# Patient Record
Sex: MALE | Race: WHITE | Employment: FULL TIME | URBAN - METROPOLITAN AREA
[De-identification: names, ages, dates, MRNs, and addresses within clinical notes are randomized per-mention and may not be internally consistent; named-entity substitution may affect disease eponyms.]

---

## 2017-09-20 ENCOUNTER — HOSPITAL ENCOUNTER (EMERGENCY)
Facility: CLINIC | Age: 37
Discharge: HOME | End: 2017-09-20
Attending: EMERGENCY MEDICINE | Admitting: EMERGENCY MEDICINE
Payer: COMMERCIAL

## 2017-09-20 VITALS
OXYGEN SATURATION: 96 % | RESPIRATION RATE: 18 BRPM | DIASTOLIC BLOOD PRESSURE: 105 MMHG | SYSTOLIC BLOOD PRESSURE: 162 MMHG | TEMPERATURE: 98 F | WEIGHT: 255.73 LBS

## 2017-09-20 DIAGNOSIS — H65.92 LEFT SEROUS OTITIS MEDIA, UNSPECIFIED CHRONICITY: ICD-10-CM

## 2017-09-20 DIAGNOSIS — I16.0 HYPERTENSIVE URGENCY: ICD-10-CM

## 2017-09-20 DIAGNOSIS — H92.02 EAR PAIN, LEFT: ICD-10-CM

## 2017-09-20 LAB
ANION GAP SERPL CALCULATED.3IONS-SCNC: 5 MMOL/L (ref 3–14)
BASOPHILS # BLD AUTO: 0.1 10E9/L (ref 0–0.2)
BASOPHILS NFR BLD AUTO: 0.9 %
BUN SERPL-MCNC: 18 MG/DL (ref 7–30)
CALCIUM SERPL-MCNC: 8.6 MG/DL (ref 8.5–10.1)
CHLORIDE SERPL-SCNC: 104 MMOL/L (ref 94–109)
CO2 SERPL-SCNC: 29 MMOL/L (ref 20–32)
CREAT SERPL-MCNC: 0.98 MG/DL (ref 0.66–1.25)
DIFFERENTIAL METHOD BLD: ABNORMAL
EOSINOPHIL # BLD AUTO: 0.3 10E9/L (ref 0–0.7)
EOSINOPHIL NFR BLD AUTO: 3.5 %
ERYTHROCYTE [DISTWIDTH] IN BLOOD BY AUTOMATED COUNT: 13.4 % (ref 10–15)
GFR SERPL CREATININE-BSD FRML MDRD: 85 ML/MIN/1.7M2
GLUCOSE SERPL-MCNC: 94 MG/DL (ref 70–99)
HCT VFR BLD AUTO: 50 % (ref 40–53)
HGB BLD-MCNC: 16.6 G/DL (ref 13.3–17.7)
IMM GRANULOCYTES # BLD: 0.1 10E9/L (ref 0–0.4)
IMM GRANULOCYTES NFR BLD: 0.8 %
INTERPRETATION ECG - MUSE: NORMAL
LYMPHOCYTES # BLD AUTO: 3 10E9/L (ref 0.8–5.3)
LYMPHOCYTES NFR BLD AUTO: 30.3 %
MCH RBC QN AUTO: 28 PG (ref 26.5–33)
MCHC RBC AUTO-ENTMCNC: 33.2 G/DL (ref 31.5–36.5)
MCV RBC AUTO: 85 FL (ref 78–100)
MONOCYTES # BLD AUTO: 0.9 10E9/L (ref 0–1.3)
MONOCYTES NFR BLD AUTO: 8.7 %
NEUTROPHILS # BLD AUTO: 5.5 10E9/L (ref 1.6–8.3)
NEUTROPHILS NFR BLD AUTO: 55.8 %
NRBC # BLD AUTO: 0 10*3/UL
NRBC BLD AUTO-RTO: 0 /100
PLATELET # BLD AUTO: 212 10E9/L (ref 150–450)
POTASSIUM SERPL-SCNC: 3.6 MMOL/L (ref 3.4–5.3)
RBC # BLD AUTO: 5.92 10E12/L (ref 4.4–5.9)
SODIUM SERPL-SCNC: 138 MMOL/L (ref 133–144)
TROPONIN I SERPL-MCNC: <0.015 UG/L (ref 0–0.04)
TSH SERPL DL<=0.005 MIU/L-ACNC: 2.2 MU/L (ref 0.4–4)
WBC # BLD AUTO: 9.8 10E9/L (ref 4–11)

## 2017-09-20 PROCEDURE — 85025 COMPLETE CBC W/AUTO DIFF WBC: CPT | Performed by: EMERGENCY MEDICINE

## 2017-09-20 PROCEDURE — 99284 EMERGENCY DEPT VISIT MOD MDM: CPT | Mod: 25

## 2017-09-20 PROCEDURE — 84484 ASSAY OF TROPONIN QUANT: CPT | Performed by: EMERGENCY MEDICINE

## 2017-09-20 PROCEDURE — 84443 ASSAY THYROID STIM HORMONE: CPT | Performed by: EMERGENCY MEDICINE

## 2017-09-20 PROCEDURE — 93005 ELECTROCARDIOGRAM TRACING: CPT

## 2017-09-20 PROCEDURE — 80048 BASIC METABOLIC PNL TOTAL CA: CPT | Performed by: EMERGENCY MEDICINE

## 2017-09-20 PROCEDURE — 25000128 H RX IP 250 OP 636: Performed by: EMERGENCY MEDICINE

## 2017-09-20 PROCEDURE — 96374 THER/PROPH/DIAG INJ IV PUSH: CPT

## 2017-09-20 RX ORDER — AMOXICILLIN 500 MG/1
1000 CAPSULE ORAL 2 TIMES DAILY
Qty: 40 CAPSULE | Refills: 0 | Status: SHIPPED | OUTPATIENT
Start: 2017-09-20 | End: 2017-09-30

## 2017-09-20 RX ORDER — LIDOCAINE 40 MG/G
CREAM TOPICAL
Status: DISCONTINUED | OUTPATIENT
Start: 2017-09-20 | End: 2017-09-20 | Stop reason: HOSPADM

## 2017-09-20 RX ORDER — HYDROCHLOROTHIAZIDE 25 MG/1
25 TABLET ORAL DAILY
Qty: 14 TABLET | Refills: 0 | Status: SHIPPED | OUTPATIENT
Start: 2017-09-20 | End: 2017-10-06

## 2017-09-20 RX ORDER — LABETALOL HYDROCHLORIDE 5 MG/ML
20 INJECTION, SOLUTION INTRAVENOUS ONCE
Status: COMPLETED | OUTPATIENT
Start: 2017-09-20 | End: 2017-09-20

## 2017-09-20 RX ORDER — LORATADINE 10 MG/1
10 TABLET ORAL DAILY
Qty: 14 TABLET | Refills: 0 | Status: SHIPPED | OUTPATIENT
Start: 2017-09-20 | End: 2018-01-12

## 2017-09-20 RX ADMIN — LABETALOL HYDROCHLORIDE 20 MG: 5 INJECTION, SOLUTION INTRAVENOUS at 10:48

## 2017-09-20 ASSESSMENT — ENCOUNTER SYMPTOMS: DIZZINESS: 1

## 2017-09-20 NOTE — DISCHARGE INSTRUCTIONS
Discharge Instructions  Hypertension - High Blood Pressure    During you visit to the Emergency Department, your blood pressure was higher than the recommended blood pressure.  This may be related to stress, pain, medication or other temporary conditions. In these cases, your blood pressure may return to normal on its own. If you have a history of high blood pressure, you may need to have your doctor adjust your medications. Sometimes, your high measurement here may indicate that you have developed high blood pressure that will stay high unless it is treated. Sudden very high blood pressure can cause problems, but usually high blood pressure causes problems over months to years.      Blood pressure is almost never lowered in the Emergency Department, because studies have shown that lowering blood pressure too quickly is much more dangerous than leaving it alone.    You need to follow up with your doctor in 1-3 days to get your blood pressure rechecked.     Return to the Emergency Department if you start to have:    A severe headache.    Chest pain.    Shortness of breath.    Weakness or numbness that affects one part of the body.    Confusion.    Vision changes.    Significant swelling of legs and/or eyes.    A reaction to any medication started in the Emergency Department.    What can I do to help myself?    Avoid alcohol.    Take any blood pressure medicine that you are prescribed.    Get a good night s sleep.    Lower your salt intake.    Exercise.    Lose weight.    Manage stress.    If blood pressure medication was started in the Emergency Department:    The medicine may not have an immediate effect. The body and brain determine what blood pressure you have. The medicine s job is to retrain the body s  thermostat  to a lower blood pressure.    You will need to follow up with your doctor to see how this medicine is working for you.  If you were given a prescription for medicine here today, be sure to read all of  the information (including the package insert) that comes with your prescription.  This will include important information about the medicine, its side effects, and any warnings that you need to know about.  The pharmacist who fills the prescription can provide more information and answer questions you may have about the medicine.  If you have questions or concerns that the pharmacist cannot address, please call or return to the Emergency Department.   Opioid Medication Information    Pain medications are among the most commonly prescribed medicines, so we are including this information for all our patients. If you did not receive pain medication or get a prescription for pain medicine, you can ignore it.     You may have been given a prescription for an opioid (narcotic) pain medicine and/or have received a pain medicine while here in the Emergency Department. These medicines can make you drowsy or impaired. You must not drive, operate dangerous equipment, or engage in any other dangerous activities while taking these medications. If you drive while taking these medications, you could be arrested for DUI, or driving under the influence. Do not drink any alcohol while you are taking these medications.     Opioid pain medications can cause addiction. If you have a history of chemical dependency of any type, you are at a higher risk of becoming addicted to pain medications.  Only take these prescribed medications to treat your pain when all other options have been tried. Take it for as short a time and as few doses as possible. Store your pain pills in a secure place, as they are frequently stolen and provide a dangerous opportunity for children or visitors in your house to start abusing these powerful medications. We will not replace any lost or stolen medicine.  As soon as your pain is better, you should flush all your remaining medication.     Many prescription pain medications contain Tylenol  (acetaminophen),  including Vicodin , Tylenol #3 , Norco , Lortab , and Percocet .  You should not take any extra pills of Tylenol  if you are using these prescription medications or you can get very sick.  Do not ever take more than 3000 mg of acetaminophen in any 24 hour period.    All opioids tend to cause constipation. Drink plenty of water and eat foods that have a lot of fiber, such as fruits, vegetables, prune juice, apple juice and high fiber cereal.  Take a laxative if you don t move your bowels at least every other day. Miralax , Milk of Magnesia, Colace , or Senna  can be used to keep you regular.      Remember that you can always come back to the Emergency Department if you are not able to see your regular doctor in the amount of time listed above, if you get any new symptoms, or if there is anything that worries you.      Discharge Instructions  Otitis Media  You or your child have an ear infection known as acute otitis media, or middle ear infection (otitis = ear, media = middle). These infections often develop after a virus infection, such as a cold. The cold causes swelling around the pressure-equalizing tube of the ear, which allows fluid to build up in the space behind the eardrum (the middle ear). This fluid build-up can trap bacteria and viruses and increase pressure on the eardrum causing pain.  Return to the Emergency Department if:    You or your child are not better within 24-48 hours.    Your child becomes very fussy or weak.    Your child is showing signs of dehydration, such as less than 3 wet diapers per day.    Your symptoms get worse, or if you develop a severe headache, stiff neck, or new symptoms.    You have signs of allergic reaction to medicine. These include rash, lip swelling, difficulty breathing, wheezing, and dizziness.    Ear infection symptoms:     Symptoms of an ear infection can include ear aching or pain and temporary hearing loss. These symptoms often come on suddenly.    Ear infection  "symptoms (infants / young children):    Fever (temperature greater than 100.4 F or 38 C).    Pulling on the ear.    Fussiness.    Decreased activity.    Lack of appetite or difficulty eating.    Vomiting or diarrhea.    Treatment:     The \"best\" treatment depends on your age, history of previous infections, and any underlying medical problems.    Antibiotics are not given to every patient with an ear infection because studies show that many people with ear infections will improve without using antibiotics. Because antibiotics can have side effects such as diarrhea and stomach upset and can also cause severe allergic reactions, doctors are trying to avoid using antibiotics if it is safe for the patient to do so.   In these cases, a prescription for antibiotics may be given to be filled in 24 -48 hours if symptoms are getting worse or not improving. If the symptoms are improving, the antibiotic does not need to be taken.     Remember, antibiotics do not treat pain.      Pain medications. You may take a pain medication such as Tylenol  (acetaminophen), Advil  (ibuprofen), Nuprin  (ibuprofen) or Aleve  (naproxen).  If you have been given a narcotic such as Vicodin  (hydrocodone with acetaminophen), Percocet  (oxycodone with acetaminophen), or codeine, do not drive for four hours after you have taken it. If the narcotic contains Tylenol  (acetaminophen), do not take Tylenol  with it. All narcotics will cause constipation, so eat a high fiber diet.      Pain treatment options also include ear drops such as Auralgan  (antipyrine/benzocaine/u-polycosanol), which contains a topical numbing medicine.     Do not take a medication if you have a known allergy to that medication.  Probiotics: If you have been given an antibiotic, you may want to also take a probiotic pill or eat yogurt with live cultures. Probiotics have \"good bacteria\" to help your intestines stay healthy. Studies have shown that probiotics help prevent diarrhea " and other intestine problems (including C. diff infection) when you take antibiotics. You can buy these without a prescription in the pharmacy section of the store.   Complications:      Tympanic membrane rupture - One possible complication of an ear infection is rupture of the tympanic membrane, or ear drum. This happens because of pressure on the tympanic membrane from the infected fluid. When the tympanic membrane ruptures, you may have pus or blood drain from the ear. It does not hurt when the membrane ruptures, and many people actually feel better because pressure is released. Fortunately, the tympanic membrane usually heals quickly after rupturing, within hours to days. You should keep water out of the ear until you re-check with your doctor to be sure the ear drum has healed.       Mastoiditis - Rarely, the area behind the ear can become infected, this area is called the mastoid.  If you notice redness and swelling behind your ear, see your physician or return to the Emergency Department immediately.        Hearing loss - The fluid that collects behind the eardrum (called an effusion) can persist for weeks to months after the pain of an ear infection resolves. An effusion causes trouble hearing, which is usually temporary. If the fluid persists, however, it can interfere with the process of learning to speak.   For this reason, children under 2 need to be seen by their pediatrician WITHIN 3 MONTHS to ensure that the fluid has been reabsorbed.  If you were given a prescription for medicine here today, be sure to read all of the information (including the package insert) that comes with your prescription.  This will include important information about the medicine, its side effects, and any warnings that you need to know about.  The pharmacist who fills the prescription can provide more information and answer questions you may have about the medicine.  If you have questions or concerns that the pharmacist  cannot address, please call or return to the Emergency Department.   Opioid Medication Information    Pain medications are among the most commonly prescribed medicines, so we are including this information for all our patients. If you did not receive pain medication or get a prescription for pain medicine, you can ignore it.     You may have been given a prescription for an opioid (narcotic) pain medicine and/or have received a pain medicine while here in the Emergency Department. These medicines can make you drowsy or impaired. You must not drive, operate dangerous equipment, or engage in any other dangerous activities while taking these medications. If you drive while taking these medications, you could be arrested for DUI, or driving under the influence. Do not drink any alcohol while you are taking these medications.     Opioid pain medications can cause addiction. If you have a history of chemical dependency of any type, you are at a higher risk of becoming addicted to pain medications.  Only take these prescribed medications to treat your pain when all other options have been tried. Take it for as short a time and as few doses as possible. Store your pain pills in a secure place, as they are frequently stolen and provide a dangerous opportunity for children or visitors in your house to start abusing these powerful medications. We will not replace any lost or stolen medicine.  As soon as your pain is better, you should flush all your remaining medication.     Many prescription pain medications contain Tylenol  (acetaminophen), including Vicodin , Tylenol #3 , Norco , Lortab , and Percocet .  You should not take any extra pills of Tylenol  if you are using these prescription medications or you can get very sick.  Do not ever take more than 3000 mg of acetaminophen in any 24 hour period.    All opioids tend to cause constipation. Drink plenty of water and eat foods that have a lot of fiber, such as fruits,  vegetables, prune juice, apple juice and high fiber cereal.  Take a laxative if you don t move your bowels at least every other day. Miralax , Milk of Magnesia, Colace , or Senna  can be used to keep you regular.      Remember that you can always come back to the Emergency Department if you are not able to see your regular doctor in the amount of time listed above, if you get any new symptoms, or if there is anything that worries you.

## 2017-09-20 NOTE — ED AVS SNAPSHOT
[facility] Emergency Department    [address]    [facility] [address]    Phone:  [phone]    Fax:  [phone]                                       Frank Flowers   MRN: [id number]    Department:  [facility] Emergency Department   Date of Visit:  9/20/2017           Patient Information     Date Of Birth          1980        Your diagnoses for this visit were:     Hypertensive urgency     Left serous otitis media, unspecified chronicity     Ear pain, left        You were seen by Erasmo Montgomery MD.      Follow-up Information     Follow up with [facility]. Schedule an appointment as soon as possible for a visit in 3 days.    Specialties:  Sports Medicine, Pain Management, Obstetrics & Gynecology, Pediatrics, Internal Medicine, Nephrology    Contact information:    [address]  [facility] [address] [phone]        Discharge Instructions       Discharge Instructions  Hypertension - High Blood Pressure    During you visit to the Emergency Department, your blood pressure was higher than the recommended blood pressure.  This may be related to stress, pain, medication or other temporary conditions. In these cases, your blood pressure may return to normal on its own. If you have a history of high blood pressure, you may need to have your doctor adjust your medications. Sometimes, your high measurement here may indicate that you have developed high blood pressure that will stay high unless it is treated. Sudden very high blood pressure can cause problems, but usually high blood pressure causes problems over months to years.      Blood pressure is almost never lowered in the Emergency Department, because studies have shown that lowering blood pressure too quickly is much more dangerous than leaving it alone.    You need to follow up with your doctor in 1-3 days to get your blood pressure rechecked.     Return  to the Emergency Department if you start to have:    A severe headache.    Chest pain.    Shortness of breath.    Weakness or numbness that affects one part of the body.    Confusion.    Vision changes.    Significant swelling of legs and/or eyes.    A reaction to any medication started in the Emergency Department.    What can I do to help myself?    Avoid alcohol.    Take any blood pressure medicine that you are prescribed.    Get a good night s sleep.    Lower your salt intake.    Exercise.    Lose weight.    Manage stress.    If blood pressure medication was started in the Emergency Department:    The medicine may not have an immediate effect. The body and brain determine what blood pressure you have. The medicine s job is to retrain the body s  thermostat  to a lower blood pressure.    You will need to follow up with your doctor to see how this medicine is working for you.  If you were given a prescription for medicine here today, be sure to read all of the information (including the package insert) that comes with your prescription.  This will include important information about the medicine, its side effects, and any warnings that you need to know about.  The pharmacist who fills the prescription can provide more information and answer questions you may have about the medicine.  If you have questions or concerns that the pharmacist cannot address, please call or return to the Emergency Department.   Opioid Medication Information    Pain medications are among the most commonly prescribed medicines, so we are including this information for all our patients. If you did not receive pain medication or get a prescription for pain medicine, you can ignore it.     You may have been given a prescription for an opioid (narcotic) pain medicine and/or have received a pain medicine while here in the Emergency Department. These medicines can make you drowsy or impaired. You must not drive, operate dangerous equipment, or  engage in any other dangerous activities while taking these medications. If you drive while taking these medications, you could be arrested for DUI, or driving under the influence. Do not drink any alcohol while you are taking these medications.     Opioid pain medications can cause addiction. If you have a history of chemical dependency of any type, you are at a higher risk of becoming addicted to pain medications.  Only take these prescribed medications to treat your pain when all other options have been tried. Take it for as short a time and as few doses as possible. Store your pain pills in a secure place, as they are frequently stolen and provide a dangerous opportunity for children or visitors in your house to start abusing these powerful medications. We will not replace any lost or stolen medicine.  As soon as your pain is better, you should flush all your remaining medication.     Many prescription pain medications contain Tylenol  (acetaminophen), including Vicodin , Tylenol #3 , Norco , Lortab , and Percocet .  You should not take any extra pills of Tylenol  if you are using these prescription medications or you can get very sick.  Do not ever take more than 3000 mg of acetaminophen in any 24 hour period.    All opioids tend to cause constipation. Drink plenty of water and eat foods that have a lot of fiber, such as fruits, vegetables, prune juice, apple juice and high fiber cereal.  Take a laxative if you don t move your bowels at least every other day. Miralax , Milk of Magnesia, Colace , or Senna  can be used to keep you regular.      Remember that you can always come back to the Emergency Department if you are not able to see your regular doctor in the amount of time listed above, if you get any new symptoms, or if there is anything that worries you.      Discharge Instructions  Otitis Media  You or your child have an ear infection known as acute otitis media, or middle ear infection (otitis = ear,  "media = middle). These infections often develop after a virus infection, such as a cold. The cold causes swelling around the pressure-equalizing tube of the ear, which allows fluid to build up in the space behind the eardrum (the middle ear). This fluid build-up can trap bacteria and viruses and increase pressure on the eardrum causing pain.  Return to the Emergency Department if:    You or your child are not better within 24-48 hours.    Your child becomes very fussy or weak.    Your child is showing signs of dehydration, such as less than 3 wet diapers per day.    Your symptoms get worse, or if you develop a severe headache, stiff neck, or new symptoms.    You have signs of allergic reaction to medicine. These include rash, lip swelling, difficulty breathing, wheezing, and dizziness.    Ear infection symptoms:     Symptoms of an ear infection can include ear aching or pain and temporary hearing loss. These symptoms often come on suddenly.    Ear infection symptoms (infants / young children):    Fever (temperature greater than 100.4 F or 38 C).    Pulling on the ear.    Fussiness.    Decreased activity.    Lack of appetite or difficulty eating.    Vomiting or diarrhea.    Treatment:     The \"best\" treatment depends on your age, history of previous infections, and any underlying medical problems.    Antibiotics are not given to every patient with an ear infection because studies show that many people with ear infections will improve without using antibiotics. Because antibiotics can have side effects such as diarrhea and stomach upset and can also cause severe allergic reactions, doctors are trying to avoid using antibiotics if it is safe for the patient to do so.   In these cases, a prescription for antibiotics may be given to be filled in 24 -48 hours if symptoms are getting worse or not improving. If the symptoms are improving, the antibiotic does not need to be taken.     Remember, antibiotics do not treat pain. " "     Pain medications. You may take a pain medication such as Tylenol  (acetaminophen), Advil  (ibuprofen), Nuprin  (ibuprofen) or Aleve  (naproxen).  If you have been given a narcotic such as Vicodin  (hydrocodone with acetaminophen), Percocet  (oxycodone with acetaminophen), or codeine, do not drive for four hours after you have taken it. If the narcotic contains Tylenol  (acetaminophen), do not take Tylenol  with it. All narcotics will cause constipation, so eat a high fiber diet.      Pain treatment options also include ear drops such as Auralgan  (antipyrine/benzocaine/u-polycosanol), which contains a topical numbing medicine.     Do not take a medication if you have a known allergy to that medication.  Probiotics: If you have been given an antibiotic, you may want to also take a probiotic pill or eat yogurt with live cultures. Probiotics have \"good bacteria\" to help your intestines stay healthy. Studies have shown that probiotics help prevent diarrhea and other intestine problems (including C. diff infection) when you take antibiotics. You can buy these without a prescription in the pharmacy section of the store.   Complications:      Tympanic membrane rupture - One possible complication of an ear infection is rupture of the tympanic membrane, or ear drum. This happens because of pressure on the tympanic membrane from the infected fluid. When the tympanic membrane ruptures, you may have pus or blood drain from the ear. It does not hurt when the membrane ruptures, and many people actually feel better because pressure is released. Fortunately, the tympanic membrane usually heals quickly after rupturing, within hours to days. You should keep water out of the ear until you re-check with your doctor to be sure the ear drum has healed.       Mastoiditis - Rarely, the area behind the ear can become infected, this area is called the mastoid.  If you notice redness and swelling behind your ear, see your physician or " return to the Emergency Department immediately.        Hearing loss - The fluid that collects behind the eardrum (called an effusion) can persist for weeks to months after the pain of an ear infection resolves. An effusion causes trouble hearing, which is usually temporary. If the fluid persists, however, it can interfere with the process of learning to speak.   For this reason, children under 2 need to be seen by their pediatrician WITHIN 3 MONTHS to ensure that the fluid has been reabsorbed.  If you were given a prescription for medicine here today, be sure to read all of the information (including the package insert) that comes with your prescription.  This will include important information about the medicine, its side effects, and any warnings that you need to know about.  The pharmacist who fills the prescription can provide more information and answer questions you may have about the medicine.  If you have questions or concerns that the pharmacist cannot address, please call or return to the Emergency Department.   Opioid Medication Information    Pain medications are among the most commonly prescribed medicines, so we are including this information for all our patients. If you did not receive pain medication or get a prescription for pain medicine, you can ignore it.     You may have been given a prescription for an opioid (narcotic) pain medicine and/or have received a pain medicine while here in the Emergency Department. These medicines can make you drowsy or impaired. You must not drive, operate dangerous equipment, or engage in any other dangerous activities while taking these medications. If you drive while taking these medications, you could be arrested for DUI, or driving under the influence. Do not drink any alcohol while you are taking these medications.     Opioid pain medications can cause addiction. If you have a history of chemical dependency of any type, you are at a higher risk of becoming  addicted to pain medications.  Only take these prescribed medications to treat your pain when all other options have been tried. Take it for as short a time and as few doses as possible. Store your pain pills in a secure place, as they are frequently stolen and provide a dangerous opportunity for children or visitors in your house to start abusing these powerful medications. We will not replace any lost or stolen medicine.  As soon as your pain is better, you should flush all your remaining medication.     Many prescription pain medications contain Tylenol  (acetaminophen), including Vicodin , Tylenol #3 , Norco , Lortab , and Percocet .  You should not take any extra pills of Tylenol  if you are using these prescription medications or you can get very sick.  Do not ever take more than 3000 mg of acetaminophen in any 24 hour period.    All opioids tend to cause constipation. Drink plenty of water and eat foods that have a lot of fiber, such as fruits, vegetables, prune juice, apple juice and high fiber cereal.  Take a laxative if you don t move your bowels at least every other day. Miralax , Milk of Magnesia, Colace , or Senna  can be used to keep you regular.      Remember that you can always come back to the Emergency Department if you are not able to see your regular doctor in the amount of time listed above, if you get any new symptoms, or if there is anything that worries you.        24 Hour Appointment Hotline       To make an appointment at any [facility], call [phone] ([phone]). If you don't have a family doctor or clinic, we will help you find one. [facility] are conveniently located to serve the needs of you and your family.             Review of your medicines      START taking        Dose / Directions Last dose taken    amoxicillin 500 MG capsule   Commonly known as:  AMOXIL   Dose:  1000 mg   Quantity:  40 capsule        Take 2 capsules (1,000 mg) by mouth 2 times daily for 10  days   Refills:  0        hydrochlorothiazide 25 MG tablet   Commonly known as:  HYDRODIURIL   Dose:  25 mg   Quantity:  14 tablet        Take 1 tablet (25 mg) by mouth daily   Refills:  0        loratadine 10 MG tablet   Commonly known as:  CLARITIN   Dose:  10 mg   Quantity:  14 tablet        Take 1 tablet (10 mg) by mouth daily   Refills:  0                Prescriptions were sent or printed at these locations (3 Prescriptions)                   Other Prescriptions                Printed at Department/Unit printer (3 of 3)         amoxicillin (AMOXIL) 500 MG capsule               loratadine (CLARITIN) 10 MG tablet               hydrochlorothiazide (HYDRODIURIL) 25 MG tablet                Procedures and tests performed during your visit     Basic metabolic panel    CBC with platelets differential    EKG 12 lead    Peripheral IV catheter    Pulse oximetry nursing    TSH    Troponin I    Vital signs      Orders Needing Specimen Collection     None      Pending Results     No orders found from 9/18/2017 to 9/21/2017.            Pending Culture Results     No orders found from 9/18/2017 to 9/21/2017.            Pending Results Instructions     If you had any lab results that were not finalized at the time of your Discharge, you can call the ED Lab Result RN at [phone]. You will be contacted by this team for any positive Lab results or changes in treatment. The nurses are available 7 days a week from 10A to 6:30P.  You can leave a message 24 hours per day and they will return your call.        Test Results From Your Hospital Stay        9/20/2017 10:49 AM      Component Results     Component Value Ref Range & Units Status    WBC 9.8 4.0 - 11.0 10e9/L Final    RBC Count 5.92 (H) 4.4 - 5.9 10e12/L Final    Hemoglobin 16.6 13.3 - 17.7 g/dL Final    Hematocrit 50.0 40.0 - 53.0 % Final    MCV 85 78 - 100 fl Final    MCH 28.0 26.5 - 33.0 pg Final    MCHC 33.2 31.5 - 36.5 g/dL Final    RDW 13.4 10.0 - 15.0 % Final     Platelet Count 212 150 - 450 10e9/L Final    Diff Method Automated Method  Final    % Neutrophils 55.8 % Final    % Lymphocytes 30.3 % Final    % Monocytes 8.7 % Final    % Eosinophils 3.5 % Final    % Basophils 0.9 % Final    % Immature Granulocytes 0.8 % Final    Nucleated RBCs 0 0 /100 Final    Absolute Neutrophil 5.5 1.6 - 8.3 10e9/L Final    Absolute Lymphocytes 3.0 0.8 - 5.3 10e9/L Final    Absolute Monocytes 0.9 0.0 - 1.3 10e9/L Final    Absolute Eosinophils 0.3 0.0 - 0.7 10e9/L Final    Absolute Basophils 0.1 0.0 - 0.2 10e9/L Final    Abs Immature Granulocytes 0.1 0 - 0.4 10e9/L Final    Absolute Nucleated RBC 0.0  Final         9/20/2017 11:08 AM      Component Results     Component Value Ref Range & Units Status    Sodium 138 133 - 144 mmol/L Final    Potassium 3.6 3.4 - 5.3 mmol/L Final    Chloride 104 94 - 109 mmol/L Final    Carbon Dioxide 29 20 - 32 mmol/L Final    Anion Gap 5 3 - 14 mmol/L Final    Glucose 94 70 - 99 mg/dL Final    Urea Nitrogen 18 7 - 30 mg/dL Final    Creatinine 0.98 0.66 - 1.25 mg/dL Final    GFR Estimate 85 >60 mL/min/1.7m2 Final    Non  GFR Calc    GFR Estimate If Black >90 >60 mL/min/1.7m2 Final    African American GFR Calc    Calcium 8.6 8.5 - 10.1 mg/dL Final         9/20/2017 11:12 AM      Component Results     Component Value Ref Range & Units Status    TSH 2.20 0.40 - 4.00 mU/L Final         9/20/2017 11:13 AM      Component Results     Component Value Ref Range & Units Status    Troponin I ES <0.015 0.000 - 0.045 ug/L Final    The 99th percentile for upper reference range is 0.045 ug/L.  Troponin values   in the range of 0.045 - 0.120 ug/L may be associated with risks of adverse   clinical events.                  Clinical Quality Measure: Blood Pressure Screening     Your blood pressure was checked while you were in the emergency department today. The last reading we obtained was  BP: (!) 160/110 . Please read the guidelines below about what these numbers  "mean and what you should do about them.  If your systolic blood pressure (the top number) is less than 120 and your diastolic blood pressure (the bottom number) is less than 80, then your blood pressure is normal. There is nothing more that you need to do about it.  If your systolic blood pressure (the top number) is 120-139 or your diastolic blood pressure (the bottom number) is 80-89, your blood pressure may be higher than it should be. You should have your blood pressure rechecked within a year by a primary care provider.  If your systolic blood pressure (the top number) is 140 or greater or your diastolic blood pressure (the bottom number) is 90 or greater, you may have high blood pressure. High blood pressure is treatable, but if left untreated over time it can put you at risk for heart attack, stroke, or kidney failure. You should have your blood pressure rechecked by a primary care provider within the next 4 weeks.  If your provider in the emergency department today gave you specific instructions to follow-up with your doctor or provider even sooner than that, you should follow that instruction and not wait for up to 4 weeks for your follow-up visit.        Thank you for choosing [facility]       Thank you for choosing [facility] for your care. Our goal is always to provide you with excellent care. Hearing back from our patients is one way we can continue to improve our services. Please take a few minutes to complete the written survey that you may receive in the mail after you visit with us. Thank you!        [employer] Information     [employer] lets you send messages to your doctor, view your test results, renew your prescriptions, schedule appointments and more. To sign up, go to [email or web address] . Click on \"Log in\" on the left side of the screen, which will take you to the Welcome page. Then click on \"Sign up Now\" on the right side of the page.     You will be asked to enter the access code listed below, " as well as some personal information. Please follow the directions to create your username and password.     Your access code is: [email or web address]  Expires: 2017 11:50 AM     Your access code will  in 90 days. If you need help or a new code, please call your [facility] clinic or [phone].        Care EveryWhere ID     This is your Care EveryWhere ID. This could be used by other organizations to access your [facility] medical records  [id number]        Equal Access to Services     ALIN MACIAS : Sangita ortega Sohermes, wawisam luqbeatrice, qaconsuelo kaalmaguillermina young, judy monterroso . So [facility] [phone].    ATENCIÓN: Si habla español, tiene a zarco disposición servicios gratuitos de asistencia lingüística. Llame al [phone].    We comply with applicable federal civil rights laws and [place] laws. We do not discriminate on the basis of race, color, national origin, age, disability sex, sexual orientation or gender identity.            After Visit Summary       This is your record. Keep this with you and show to your community pharmacist(s) and doctor(s) at your next visit.

## 2017-09-20 NOTE — ED PROVIDER NOTES
History     Chief Complaint:  Ear Pain    HPI   Frank Flowers is a 37 year old male who presents to the emergency department today for evaluation of ear pain. The patient developed left sided ear pain a few days ago. Pain has not improved and is aggravated with laying flat. Patient went to [facility]. Patient was not found to have an ear infection and he was advised to visit the ED due to elevated blood pressures. Here, patient rates his ear pain to be 5/10. He last checked his blood pressure in 2012 after he went to Urgent care for chest pain. He was subsequently diagnosed with costochondritis. He admits to have some chest discomfort recently but believes this could be another episode of costochondritis. He also reports an episode of dizziness last night. Patient reports increased personal stress. He also reports that he used to drink a lot of 5 hour energy but recently stopped this. Patient's last alcohol use was 2 days ago.       Allergies:  No Known Drug Allergies    Medications:    The patient is currently on no regular medications.    Past Medical History:    History reviewed. No pertinent past medical history.    Past Surgical History:    History reviewed. No pertinent past surgical history.    Family History:    Depression   Anxiety     Social History:  The patient presents alone.  Smoking Status: Current every day smoker- 0.50 packs/day  Smokeless Tobacco: Never used  Alcohol Use: Yes  Marital Status:  Single [1]     Review of Systems   HENT: Positive for ear pain.    Cardiovascular: Positive for chest pain.   Neurological: Positive for dizziness.   All other systems reviewed and are negative.    Physical Exam   First Vitals:  BP: (!) 230/141  Heart Rate: 86  Temp: 98  F (36.7  C)  Resp: 16  Weight: 116 kg (255 lb 11.7 oz)  SpO2: 100 %      1130 BP- 160/110    Physical Exam  General: The patient is alert, in no respiratory distress.    HENT: Mucous membranes moist. Opaque fluid behind left TM.      Cardiovascular: Regular rate and rhythm. Good pulses in all four extremities. Normal capillary refill and skin turgor.     Respiratory: Lungs are clear. No nasal flaring. No retractions. No wheezing, no crackles.    Gastrointestinal: Abdomen soft. No guarding, no rebound. No palpable hernias.     Musculoskeletal: No gross deformity.     Skin: No rashes or petechiae.     Neurologic: The patient is alert and oriented x3. GCS 15. No testable cranial nerve deficit. Follows commands with clear and appropriate speech. Gives appropriate answers. Good strength in all extremities. No gross neurologic deficit. Gross sensation intact. Pupils are round and reactive. No meningismus.     Lymphatic: No cervical adenopathy. No lower extremity swelling.    Psychiatric: The patient is non-tearful.    Emergency Department Course     ECG:  ECG taken at 1041, ECG read at 1047  Normal sinus rhythm   Normal ECG  Rate 82 bpm. FL interval 136. QRS duration 90. QT/QTc 410/479. P-R-T axes 43 80 62.    Laboratory:  Laboratory findings were communicated with the patient who voiced understanding of the findings.    CBC: RBC: 5.92(H) o/w WNL. (WBC 9.8, HGB 16.6, )     basic metabolic panel: AWNL (Creatinine: 0.96)    TSH: 2.20    Troponin (Collected 1034): <0.015      Interventions:  1048- Labetalol 20 mg IV        Emergency Department Course:  Nursing notes and vitals reviewed.  I performed an exam of the patient as documented above.       IV was inserted and blood was drawn for laboratory testing, results above.    I discussed the treatment plan with the patient. They expressed understanding of this plan and consented to discharge.    I personally reviewed the laboratory results with the Patient and answered all related questions prior to discharge.    Impression & Plan      Medical Decision Making:  The patient has not had his blood pressure checked in the last 5 years and has grossly elevated blood pressure in the 200s. The patient  lacked few symptoms with this other than intermittent chest pain which sounds much like costochondritis but none in the recent history as well as left ear pain which is likely causing his dizziness. I did clean out his ear canal and there was some fluid behind the canal. I started him on a decongestant, blood pressure medication which was administered here in the ED, and Hydrochlorothiazide to go home with. He should follow up with his PCP. No signs of end organ damage. The patient diagnosed with hypertensive urgency which has improved here in the ED He was told to return if he developed chest pain, weakness or other symptoms which are currently not present.         Diagnosis:    ICD-10-CM    1. Hypertensive urgency I16.0    2. Left serous otitis media, unspecified chronicity H65.92    3. Ear pain, left H92.02          Disposition:   Discharge     Discharge Medications:  Discharge Medication List as of 9/20/2017 11:50 AM      START taking these medications    Details   amoxicillin (AMOXIL) 500 MG capsule Take 2 capsules (1,000 mg) by mouth 2 times daily for 10 days, Disp-40 capsule, R-0, Local Print      loratadine (CLARITIN) 10 MG tablet Take 1 tablet (10 mg) by mouth daily, Disp-14 tablet, R-0, Local Print      hydrochlorothiazide (HYDRODIURIL) 25 MG tablet Take 1 tablet (25 mg) by mouth daily, Disp-14 tablet, R-0, Local Print             Scribe Disclosure:  Shlomo ROSENBERG, am serving as a scribe at 10:27 AM on 9/20/2017 to document services personally performed by Erasmo Montgomery MD, based on my observations and the provider's statements to me.    9/20/2017   [facility] EMERGENCY DEPARTMENT       Erasmo Montgomery MD  09/20/17 1415       Erasmo Montgomery MD  09/20/17 3410

## 2017-09-20 NOTE — ED AVS SNAPSHOT
[facility] Emergency Department    [address]    [facility] [address]    Phone:  [phone]    Fax:  [phone]                                       Frank Flowers   MRN: [id number]    Department:  [facility] Emergency Department   Date of Visit:  9/20/2017           After Visit Summary Signature Page     I have received my discharge instructions, and my questions have been answered. I have discussed any challenges I see with this plan with the nurse or doctor.    ..........................................................................................................................................  Patient/Patient Representative Signature      ..........................................................................................................................................  Patient Representative Print Name and Relationship to Patient    ..................................................               ................................................  Date                                            Time    ..........................................................................................................................................  Reviewed by Signature/Title    ...................................................              ..............................................  Date                                                            Time

## 2017-10-06 ENCOUNTER — OFFICE VISIT (OUTPATIENT)
Dept: INTERNAL MEDICINE | Facility: CLINIC | Age: 37
End: 2017-10-06

## 2017-10-06 VITALS
TEMPERATURE: 97.9 F | HEIGHT: 70 IN | DIASTOLIC BLOOD PRESSURE: 108 MMHG | OXYGEN SATURATION: 98 % | HEART RATE: 90 BPM | SYSTOLIC BLOOD PRESSURE: 156 MMHG | BODY MASS INDEX: 37.87 KG/M2 | WEIGHT: 264.5 LBS

## 2017-10-06 DIAGNOSIS — G47.10 EXCESSIVE SLEEPINESS: ICD-10-CM

## 2017-10-06 DIAGNOSIS — I10 BENIGN ESSENTIAL HYPERTENSION: Primary | ICD-10-CM

## 2017-10-06 PROCEDURE — 99204 OFFICE O/P NEW MOD 45 MIN: CPT | Performed by: FAMILY MEDICINE

## 2017-10-06 RX ORDER — HYDROCHLOROTHIAZIDE 25 MG/1
25 TABLET ORAL DAILY
Qty: 30 TABLET | Refills: 0 | Status: SHIPPED | OUTPATIENT
Start: 2017-10-06 | End: 2018-01-12

## 2017-10-06 NOTE — PROGRESS NOTES
SUBJECTIVE:   Frank Flowers is a 37 year old male who presents to clinic today for the following health issues:    ED/UC Followup:    Facility:  [facility] ED  Date of visit: 9/20/2017  Reason for visit: hypertensive urgency  Current Status: stopped med     Subjective:   Frank Flowers is a 37 year old male with hypertension.  Current Outpatient Prescriptions   Medication Sig Dispense Refill     hydrochlorothiazide (HYDRODIURIL) 25 MG tablet Take 1 tablet (25 mg) by mouth daily 14 tablet 0     loratadine (CLARITIN) 10 MG tablet Take 1 tablet (10 mg) by mouth daily (Patient not taking: Reported on 10/6/2017) 14 tablet 0    BP was noted to be 230 systolic but had really no sx    Was seen at  for ear ache and referred to ED for urgency.   Started on hctz but ran out of med.  Endorses marked amount of stress related to job change recently   Non smoker.   No fhx of htn or at least none that he knows of.     No heart surgery.  Hypertension ROS: not taking medications regularly as instructed, no medication side effects noted, patient does not perform home BP monitoring, no TIA's, no chest pain on exertion, no dyspnea on exertion, no swelling of ankles, no orthostatic dizziness or lightheadedness, no orthopnea or paroxysmal nocturnal dyspnea, no palpitations, no erectile dysfunction.   New concerns: tired all of the time and has woken up gasping for air   Has been told that he snores.     Review Of Systems  Skin: negative  Eyes: negative  Ears/Nose/Throat: negative  Respiratory: No shortness of breath, dyspnea on exertion, cough, or hemoptysis  Cardiovascular: negative  Gastrointestinal: negative  Genitourinary: negative  Musculoskeletal: negative  Neurologic: negative  Psychiatric: excessive stress-sleeping poorly, anxiety and fatigue  Hematologic/Lymphatic/Immunologic: negative  Endocrine: negative    Objective:   BP (!) 156/108 (BP Location: Right arm, Patient Position: Sitting, Cuff Size: Adult Large)   "Pulse 90  Temp 97.9  F (36.6  C) (Oral)  Ht 5' 10\" (1.778 m)  Wt 264 lb 8 oz (120 kg)  SpO2 98%  BMI 37.95 kg/m2   GENERAL: obese, alert and no distress  EYES: Eyes grossly normal to inspection, PERRL and conjunctivae and sclerae normal  NECK: no adenopathy, no asymmetry, masses, or scars and thyroid normal to palpation  RESP: lungs clear to auscultation - no rales, rhonchi or wheezes  CV: regular rate and rhythm, normal S1 S2, no S3 or S4, no murmur, click or rub, no peripheral edema and peripheral pulses strong  MS: no gross musculoskeletal defects noted, no edema  SKIN: no suspicious lesions or rashes  NEURO: Normal strength and tone, mentation intact and speech normal  PSYCH: mentation appears normal, affect normal/bright    Assessment:    1. Benign essential hypertension  Restart med   Needs sleep testing  - hydrochlorothiazide (HYDRODIURIL) 25 MG tablet; Take 1 tablet (25 mg) by mouth daily  Dispense: 30 tablet; Refill: 0  - SLEEP EVALUATION & MANAGEMENT REFERRAL - ADULT; Future    2. Excessive sleepiness    - hydrochlorothiazide (HYDRODIURIL) 25 MG tablet; Take 1 tablet (25 mg) by mouth daily  Dispense: 30 tablet; Refill: 0  - SLEEP EVALUATION & MANAGEMENT REFERRAL - ADULT; Future    orders and follow up as documented in EpicCare, reviewed diet, exercise and weight control, recommended sodium restriction.    "

## 2017-10-06 NOTE — MR AVS SNAPSHOT
After Visit Summary   10/6/2017    Frank Flowers    MRN: [id number]           Patient Information     Date Of Birth          1980        Visit Information        Provider Department      10/6/2017 9:40 AM Raymond Menezes MD [facility]        Today's Diagnoses     Benign essential hypertension    -  1    Excessive sleepiness           Follow-ups after your visit        Additional Services     SLEEP EVALUATION & MANAGEMENT REFERRAL - ADULT       Please be aware that coverage of these services is subject to the terms and limitations of your health insurance plan.  Call member services at your health plan with any benefit or coverage questions.      Please bring the following to your appointment:    >>   List of current medications   >>   This referral request   >>   Any documents/labs given to you for this referral    [facility]  Ph [phone] (Age 18 and up)                  Future tests that were ordered for you today     Open Future Orders        Priority Expected Expires Ordered    SLEEP EVALUATION & MANAGEMENT REFERRAL - ADULT Routine  10/6/2018 10/6/2017            Who to contact     If you have questions or need follow up information about today's clinic visit or your schedule please contact [facility] directly at [phone].  Normal or non-critical lab and imaging results will be communicated to you by MyChart, letter or phone within 4 business days after the clinic has received the results. If you do not hear from us within 7 days, please contact the clinic through MyChart or phone. If you have a critical or abnormal lab result, we will notify you by phone as soon as possible.  Submit refill requests through [employer] or call your pharmacy and they will forward the refill request to us. Please allow 3 business days for your refill to be completed.          Additional Information About Your Visit        MyChart Information      "TM lets you send messages to your doctor, view your test results, renew your prescriptions, schedule appointments and more. To sign up, go to [email or web address] . Click on \"Log in\" on the left side of the screen, which will take you to the Welcome page. Then click on \"Sign up Now\" on the right side of the page.     You will be asked to enter the access code listed below, as well as some personal information. Please follow the directions to create your username and password.     Your access code is: [email or web address]  Expires: 2017 11:50 AM     Your access code will  in 90 days. If you need help or a new code, please call your [facility] clinic or [phone].        Care EveryWhere ID     This is your [facility] EveryWhere ID. This could be used by other organizations to access your [facility] medical records  [id number]        Your Vitals Were     Pulse Temperature Height Pulse Oximetry BMI (Body Mass Index)       90 97.9  F (36.6  C) (Oral) 5' 10\" (1.778 m) 98% 37.95 kg/m2        Blood Pressure from Last 3 Encounters:   10/06/17 (!) 156/108   17 (!) 162/105    Weight from Last 3 Encounters:   10/06/17 264 lb 8 oz (120 kg)   17 255 lb 11.7 oz (116 kg)                 Where to get your medicines      These medications were sent to [facility] [place], [place] - [address]  [address] [facility] [address]     Phone:  [phone]     hydrochlorothiazide 25 MG tablet          Primary Care Provider Fax #    Physician No Ref-Primary [phone]       No address on file        Equal Access to Services     ALIN MACIAS : Sangita Deleon, aden grande, elly kaalmada ahnnah, judy boles So [facility] [phone].    ATENCIÓN: Si habla español, tiene a zarco disposición servicios gratuitos de asistencia lingüística. Llame al [phone].    We comply with applicable federal civil rights laws and [place] laws. We do " not discriminate on the basis of race, color, national origin, age, disability, sex, sexual orientation, or gender identity.            Thank you!     Thank you for choosing [facility]  for your care. Our goal is always to provide you with excellent care. Hearing back from our patients is one way we can continue to improve our services. Please take a few minutes to complete the written survey that you may receive in the mail after your visit with us. Thank you!             Your Updated Medication List - Protect others around you: Learn how to safely use, store and throw away your medicines at www.disposemymeds.org.          This list is accurate as of: 10/6/17  9:51 AM.  Always use your most recent med list.                   Brand Name Dispense Instructions for use Diagnosis    hydrochlorothiazide 25 MG tablet    HYDRODIURIL    30 tablet    Take 1 tablet (25 mg) by mouth daily    Benign essential hypertension, Excessive sleepiness       loratadine 10 MG tablet    CLARITIN    14 tablet    Take 1 tablet (10 mg) by mouth daily

## 2017-10-06 NOTE — NURSING NOTE
"Chief Complaint   Patient presents with     ER F/U     new patient     Recheck Medication       Initial BP (!) 156/108 (BP Location: Right arm, Patient Position: Sitting, Cuff Size: Adult Large)  Pulse 90  Temp 97.9  F (36.6  C) (Oral)  Ht 5' 10\" (1.778 m)  Wt 264 lb 8 oz (120 kg)  SpO2 98%  BMI 37.95 kg/m2 Estimated body mass index is 37.95 kg/(m^2) as calculated from the following:    Height as of this encounter: 5' 10\" (1.778 m).    Weight as of this encounter: 264 lb 8 oz (120 kg).  Medication Reconciliation: complete   Ashley MENDEZ      "

## 2018-01-12 RX ORDER — HYDROCODONE BITARTRATE AND ACETAMINOPHEN 5; 325 MG/1; MG/1
1 TABLET ORAL EVERY 6 HOURS PRN
COMMUNITY
End: 2018-04-12

## 2018-01-14 ENCOUNTER — ANESTHESIA (OUTPATIENT)
Dept: SURGERY | Facility: CLINIC | Age: 38
End: 2018-01-14
Payer: OTHER MISCELLANEOUS

## 2018-01-14 ENCOUNTER — HOSPITAL ENCOUNTER (OUTPATIENT)
Facility: CLINIC | Age: 38
Discharge: HOME | End: 2018-01-14
Attending: ORTHOPAEDIC SURGERY | Admitting: ORTHOPAEDIC SURGERY
Payer: OTHER MISCELLANEOUS

## 2018-01-14 ENCOUNTER — APPOINTMENT (OUTPATIENT)
Dept: GENERAL RADIOLOGY | Facility: CLINIC | Age: 38
End: 2018-01-14
Attending: ORTHOPAEDIC SURGERY
Payer: OTHER MISCELLANEOUS

## 2018-01-14 ENCOUNTER — SURGERY (OUTPATIENT)
Age: 38
End: 2018-01-14

## 2018-01-14 ENCOUNTER — ANESTHESIA EVENT (OUTPATIENT)
Dept: SURGERY | Facility: CLINIC | Age: 38
End: 2018-01-14
Payer: OTHER MISCELLANEOUS

## 2018-01-14 VITALS
SYSTOLIC BLOOD PRESSURE: 137 MMHG | WEIGHT: 270 LBS | HEIGHT: 70 IN | TEMPERATURE: 98.9 F | BODY MASS INDEX: 38.65 KG/M2 | OXYGEN SATURATION: 96 % | DIASTOLIC BLOOD PRESSURE: 113 MMHG | RESPIRATION RATE: 16 BRPM

## 2018-01-14 DIAGNOSIS — S82.202A CLOSED FRACTURE SHAFT FIBULA AND TIBIA, LEFT, INITIAL ENCOUNTER: Primary | ICD-10-CM

## 2018-01-14 DIAGNOSIS — S82.402A CLOSED FRACTURE SHAFT FIBULA AND TIBIA, LEFT, INITIAL ENCOUNTER: Primary | ICD-10-CM

## 2018-01-14 LAB
CREAT SERPL-MCNC: 0.93 MG/DL (ref 0.66–1.25)
GFR SERPL CREATININE-BSD FRML MDRD: >90 ML/MIN/1.7M2
POTASSIUM SERPL-SCNC: 4.1 MMOL/L (ref 3.4–5.3)

## 2018-01-14 PROCEDURE — 36415 COLL VENOUS BLD VENIPUNCTURE: CPT | Performed by: ANESTHESIOLOGY

## 2018-01-14 PROCEDURE — 93010 ELECTROCARDIOGRAM REPORT: CPT | Performed by: INTERNAL MEDICINE

## 2018-01-14 PROCEDURE — 27210794 ZZH OR GENERAL SUPPLY STERILE: Performed by: ORTHOPAEDIC SURGERY

## 2018-01-14 PROCEDURE — 37000009 ZZH ANESTHESIA TECHNICAL FEE, EACH ADDTL 15 MIN: Performed by: ORTHOPAEDIC SURGERY

## 2018-01-14 PROCEDURE — 71000013 ZZH RECOVERY PHASE 1 LEVEL 1 EA ADDTL HR: Performed by: ORTHOPAEDIC SURGERY

## 2018-01-14 PROCEDURE — 25000128 H RX IP 250 OP 636: Performed by: NURSE ANESTHETIST, CERTIFIED REGISTERED

## 2018-01-14 PROCEDURE — 37000008 ZZH ANESTHESIA TECHNICAL FEE, 1ST 30 MIN: Performed by: ORTHOPAEDIC SURGERY

## 2018-01-14 PROCEDURE — 71000012 ZZH RECOVERY PHASE 1 LEVEL 1 FIRST HR: Performed by: ORTHOPAEDIC SURGERY

## 2018-01-14 PROCEDURE — 71000027 ZZH RECOVERY PHASE 2 EACH 15 MINS: Performed by: ORTHOPAEDIC SURGERY

## 2018-01-14 PROCEDURE — 25000566 ZZH SEVOFLURANE, EA 15 MIN: Performed by: ORTHOPAEDIC SURGERY

## 2018-01-14 PROCEDURE — 25000128 H RX IP 250 OP 636: Performed by: ANESTHESIOLOGY

## 2018-01-14 PROCEDURE — C1713 ANCHOR/SCREW BN/BN,TIS/BN: HCPCS | Performed by: ORTHOPAEDIC SURGERY

## 2018-01-14 PROCEDURE — 25000132 ZZH RX MED GY IP 250 OP 250 PS 637: Performed by: ORTHOPAEDIC SURGERY

## 2018-01-14 PROCEDURE — 84132 ASSAY OF SERUM POTASSIUM: CPT | Performed by: ANESTHESIOLOGY

## 2018-01-14 PROCEDURE — 36000065 ZZH SURGERY LEVEL 4 W FLUORO 1ST 30 MIN: Performed by: ORTHOPAEDIC SURGERY

## 2018-01-14 PROCEDURE — C1769 GUIDE WIRE: HCPCS | Performed by: ORTHOPAEDIC SURGERY

## 2018-01-14 PROCEDURE — 25000125 ZZHC RX 250: Performed by: ANESTHESIOLOGY

## 2018-01-14 PROCEDURE — 25000125 ZZHC RX 250: Performed by: NURSE ANESTHETIST, CERTIFIED REGISTERED

## 2018-01-14 PROCEDURE — 25000128 H RX IP 250 OP 636: Performed by: ORTHOPAEDIC SURGERY

## 2018-01-14 PROCEDURE — 40000277 XR SURGERY CARM FLUORO LESS THAN 5 MIN W STILLS: Mod: TC

## 2018-01-14 PROCEDURE — 36000063 ZZH SURGERY LEVEL 4 EA 15 ADDTL MIN: Performed by: ORTHOPAEDIC SURGERY

## 2018-01-14 PROCEDURE — 82565 ASSAY OF CREATININE: CPT | Performed by: ANESTHESIOLOGY

## 2018-01-14 PROCEDURE — 40000306 ZZH STATISTIC PRE PROC ASSESS II: Performed by: ORTHOPAEDIC SURGERY

## 2018-01-14 DEVICE — IMP SCR SYN 5.0 TI LOCK T25 STARDRIVE 38MM 04.005.528S: Type: IMPLANTABLE DEVICE | Site: TIBIA | Status: FUNCTIONAL

## 2018-01-14 DEVICE — IMP SCR SYN 5.0 TI LOCK T25 STARDRIVE 40MM 04.005.530S: Type: IMPLANTABLE DEVICE | Site: TIBIA | Status: FUNCTIONAL

## 2018-01-14 DEVICE — IMP SCR SYN 5.0 TI LOCK T25 STARDRIVE 34MM 04.005.524S: Type: IMPLANTABLE DEVICE | Site: TIBIA | Status: FUNCTIONAL

## 2018-01-14 DEVICE — IMP SCR SYN 5.0 TI LOCK T25 STARDRIVE 36MM 04.005.526S: Type: IMPLANTABLE DEVICE | Site: TIBIA | Status: FUNCTIONAL

## 2018-01-14 DEVICE — IMPLANTABLE DEVICE: Type: IMPLANTABLE DEVICE | Site: TIBIA | Status: FUNCTIONAL

## 2018-01-14 RX ORDER — HYDRALAZINE HYDROCHLORIDE 20 MG/ML
2.5-5 INJECTION INTRAMUSCULAR; INTRAVENOUS EVERY 10 MIN PRN
Status: DISCONTINUED | OUTPATIENT
Start: 2018-01-14 | End: 2018-01-14 | Stop reason: HOSPADM

## 2018-01-14 RX ORDER — ONDANSETRON 2 MG/ML
INJECTION INTRAMUSCULAR; INTRAVENOUS PRN
Status: DISCONTINUED | OUTPATIENT
Start: 2018-01-14 | End: 2018-01-14

## 2018-01-14 RX ORDER — ACETAMINOPHEN 325 MG/1
975 TABLET ORAL EVERY 6 HOURS PRN
Qty: 50 TABLET | Refills: 0 | Status: SHIPPED | OUTPATIENT
Start: 2018-01-14 | End: 2018-04-12

## 2018-01-14 RX ORDER — NEOSTIGMINE METHYLSULFATE 1 MG/ML
VIAL (ML) INJECTION PRN
Status: DISCONTINUED | OUTPATIENT
Start: 2018-01-14 | End: 2018-01-14

## 2018-01-14 RX ORDER — FENTANYL CITRATE 50 UG/ML
25-50 INJECTION, SOLUTION INTRAMUSCULAR; INTRAVENOUS
Status: DISCONTINUED | OUTPATIENT
Start: 2018-01-14 | End: 2018-01-14 | Stop reason: HOSPADM

## 2018-01-14 RX ORDER — ONDANSETRON 4 MG/1
4 TABLET, ORALLY DISINTEGRATING ORAL
Status: DISCONTINUED | OUTPATIENT
Start: 2018-01-14 | End: 2018-01-14 | Stop reason: HOSPADM

## 2018-01-14 RX ORDER — HYDROXYZINE HYDROCHLORIDE 25 MG/1
25 TABLET, FILM COATED ORAL EVERY 6 HOURS PRN
Qty: 30 TABLET | Refills: 0 | Status: SHIPPED | OUTPATIENT
Start: 2018-01-14 | End: 2018-04-12

## 2018-01-14 RX ORDER — IBUPROFEN 600 MG/1
600 TABLET, FILM COATED ORAL EVERY 6 HOURS PRN
Qty: 50 TABLET | Refills: 0 | Status: SHIPPED | OUTPATIENT
Start: 2018-01-14 | End: 2018-04-12

## 2018-01-14 RX ORDER — FENTANYL CITRATE 50 UG/ML
25-50 INJECTION, SOLUTION INTRAMUSCULAR; INTRAVENOUS EVERY 5 MIN PRN
Status: DISCONTINUED | OUTPATIENT
Start: 2018-01-14 | End: 2018-01-14 | Stop reason: HOSPADM

## 2018-01-14 RX ORDER — NALOXONE HYDROCHLORIDE 0.4 MG/ML
.1-.4 INJECTION, SOLUTION INTRAMUSCULAR; INTRAVENOUS; SUBCUTANEOUS
Status: DISCONTINUED | OUTPATIENT
Start: 2018-01-14 | End: 2018-01-14 | Stop reason: HOSPADM

## 2018-01-14 RX ORDER — SODIUM CHLORIDE, SODIUM LACTATE, POTASSIUM CHLORIDE, CALCIUM CHLORIDE 600; 310; 30; 20 MG/100ML; MG/100ML; MG/100ML; MG/100ML
INJECTION, SOLUTION INTRAVENOUS CONTINUOUS
Status: DISCONTINUED | OUTPATIENT
Start: 2018-01-14 | End: 2018-01-14 | Stop reason: HOSPADM

## 2018-01-14 RX ORDER — HYDROMORPHONE HYDROCHLORIDE 1 MG/ML
.3-.5 INJECTION, SOLUTION INTRAMUSCULAR; INTRAVENOUS; SUBCUTANEOUS EVERY 10 MIN PRN
Status: DISCONTINUED | OUTPATIENT
Start: 2018-01-14 | End: 2018-01-14 | Stop reason: HOSPADM

## 2018-01-14 RX ORDER — MEPERIDINE HYDROCHLORIDE 25 MG/ML
12.5 INJECTION INTRAMUSCULAR; INTRAVENOUS; SUBCUTANEOUS
Status: DISCONTINUED | OUTPATIENT
Start: 2018-01-14 | End: 2018-01-14 | Stop reason: HOSPADM

## 2018-01-14 RX ORDER — HYDROXYZINE HYDROCHLORIDE 25 MG/1
25 TABLET, FILM COATED ORAL
Status: COMPLETED | OUTPATIENT
Start: 2018-01-14 | End: 2018-01-14

## 2018-01-14 RX ORDER — GLYCOPYRROLATE 0.2 MG/ML
INJECTION, SOLUTION INTRAMUSCULAR; INTRAVENOUS PRN
Status: DISCONTINUED | OUTPATIENT
Start: 2018-01-14 | End: 2018-01-14

## 2018-01-14 RX ORDER — METOPROLOL TARTRATE 1 MG/ML
1-2 INJECTION, SOLUTION INTRAVENOUS EVERY 5 MIN PRN
Status: DISCONTINUED | OUTPATIENT
Start: 2018-01-14 | End: 2018-01-14 | Stop reason: HOSPADM

## 2018-01-14 RX ORDER — OXYCODONE HYDROCHLORIDE 5 MG/1
5-10 TABLET ORAL EVERY 4 HOURS PRN
Qty: 20 TABLET | Refills: 0 | Status: SHIPPED | OUTPATIENT
Start: 2018-01-14 | End: 2018-04-12

## 2018-01-14 RX ORDER — GABAPENTIN 300 MG/1
300 CAPSULE ORAL 3 TIMES DAILY
Qty: 9 CAPSULE | Refills: 0 | Status: SHIPPED | OUTPATIENT
Start: 2018-01-14 | End: 2018-04-12

## 2018-01-14 RX ORDER — DEXAMETHASONE SODIUM PHOSPHATE 4 MG/ML
INJECTION, SOLUTION INTRA-ARTICULAR; INTRALESIONAL; INTRAMUSCULAR; INTRAVENOUS; SOFT TISSUE PRN
Status: DISCONTINUED | OUTPATIENT
Start: 2018-01-14 | End: 2018-01-14

## 2018-01-14 RX ORDER — PROPOFOL 10 MG/ML
INJECTION, EMULSION INTRAVENOUS PRN
Status: DISCONTINUED | OUTPATIENT
Start: 2018-01-14 | End: 2018-01-14

## 2018-01-14 RX ORDER — HYDRALAZINE HYDROCHLORIDE 20 MG/ML
10 INJECTION INTRAMUSCULAR; INTRAVENOUS ONCE
Status: COMPLETED | OUTPATIENT
Start: 2018-01-14 | End: 2018-01-14

## 2018-01-14 RX ORDER — ALBUTEROL SULFATE 0.83 MG/ML
2.5 SOLUTION RESPIRATORY (INHALATION) EVERY 4 HOURS PRN
Status: DISCONTINUED | OUTPATIENT
Start: 2018-01-14 | End: 2018-01-14 | Stop reason: HOSPADM

## 2018-01-14 RX ORDER — IBUPROFEN 600 MG/1
600 TABLET, FILM COATED ORAL
Status: DISCONTINUED | OUTPATIENT
Start: 2018-01-14 | End: 2018-01-14

## 2018-01-14 RX ORDER — OXYCODONE HYDROCHLORIDE 5 MG/1
5 TABLET ORAL
Status: COMPLETED | OUTPATIENT
Start: 2018-01-14 | End: 2018-01-14

## 2018-01-14 RX ORDER — CEFAZOLIN SODIUM 1 G/50ML
3 SOLUTION INTRAVENOUS
Status: COMPLETED | OUTPATIENT
Start: 2018-01-14 | End: 2018-01-14

## 2018-01-14 RX ORDER — KETOROLAC TROMETHAMINE 30 MG/ML
30 INJECTION, SOLUTION INTRAMUSCULAR; INTRAVENOUS EVERY 6 HOURS PRN
Status: DISCONTINUED | OUTPATIENT
Start: 2018-01-14 | End: 2018-01-14 | Stop reason: HOSPADM

## 2018-01-14 RX ORDER — CEFAZOLIN SODIUM 1 G/3ML
1 INJECTION, POWDER, FOR SOLUTION INTRAMUSCULAR; INTRAVENOUS SEE ADMIN INSTRUCTIONS
Status: DISCONTINUED | OUTPATIENT
Start: 2018-01-14 | End: 2018-01-14 | Stop reason: HOSPADM

## 2018-01-14 RX ORDER — LIDOCAINE 40 MG/G
CREAM TOPICAL
Status: DISCONTINUED | OUTPATIENT
Start: 2018-01-14 | End: 2018-01-14 | Stop reason: HOSPADM

## 2018-01-14 RX ORDER — ONDANSETRON 2 MG/ML
4 INJECTION INTRAMUSCULAR; INTRAVENOUS EVERY 30 MIN PRN
Status: DISCONTINUED | OUTPATIENT
Start: 2018-01-14 | End: 2018-01-14 | Stop reason: HOSPADM

## 2018-01-14 RX ORDER — OXYCODONE HYDROCHLORIDE 5 MG/1
5 TABLET ORAL EVERY 4 HOURS PRN
Status: DISCONTINUED | OUTPATIENT
Start: 2018-01-14 | End: 2018-01-14 | Stop reason: HOSPADM

## 2018-01-14 RX ORDER — ONDANSETRON 4 MG/1
4 TABLET, ORALLY DISINTEGRATING ORAL EVERY 30 MIN PRN
Status: DISCONTINUED | OUTPATIENT
Start: 2018-01-14 | End: 2018-01-14 | Stop reason: HOSPADM

## 2018-01-14 RX ADMIN — Medication 50 MG: at 08:39

## 2018-01-14 RX ADMIN — ROCURONIUM BROMIDE 5 MG: 10 INJECTION INTRAVENOUS at 08:56

## 2018-01-14 RX ADMIN — DEXAMETHASONE SODIUM PHOSPHATE 4 MG: 4 INJECTION, SOLUTION INTRA-ARTICULAR; INTRALESIONAL; INTRAMUSCULAR; INTRAVENOUS; SOFT TISSUE at 08:39

## 2018-01-14 RX ADMIN — Medication 0.5 MG: at 08:51

## 2018-01-14 RX ADMIN — FENTANYL CITRATE 50 MCG: 50 INJECTION INTRAMUSCULAR; INTRAVENOUS at 10:58

## 2018-01-14 RX ADMIN — PHENYLEPHRINE HYDROCHLORIDE 100 MCG: 10 INJECTION, SOLUTION INTRAMUSCULAR; INTRAVENOUS; SUBCUTANEOUS at 09:26

## 2018-01-14 RX ADMIN — PROPOFOL 300 MG: 10 INJECTION, EMULSION INTRAVENOUS at 08:39

## 2018-01-14 RX ADMIN — KETOROLAC TROMETHAMINE 30 MG: 30 INJECTION, SOLUTION INTRAMUSCULAR at 10:47

## 2018-01-14 RX ADMIN — SODIUM CHLORIDE, POTASSIUM CHLORIDE, SODIUM LACTATE AND CALCIUM CHLORIDE: 600; 310; 30; 20 INJECTION, SOLUTION INTRAVENOUS at 09:20

## 2018-01-14 RX ADMIN — Medication 1 MG: at 09:11

## 2018-01-14 RX ADMIN — ROCURONIUM BROMIDE 15 MG: 10 INJECTION INTRAVENOUS at 09:21

## 2018-01-14 RX ADMIN — ROCURONIUM BROMIDE 10 MG: 10 INJECTION INTRAVENOUS at 09:34

## 2018-01-14 RX ADMIN — ONDANSETRON 4 MG: 2 INJECTION INTRAMUSCULAR; INTRAVENOUS at 09:06

## 2018-01-14 RX ADMIN — Medication 0.5 MG: at 08:54

## 2018-01-14 RX ADMIN — MIDAZOLAM 2 MG: 1 INJECTION INTRAMUSCULAR; INTRAVENOUS at 08:33

## 2018-01-14 RX ADMIN — OXYCODONE HYDROCHLORIDE 5 MG: 5 TABLET ORAL at 11:13

## 2018-01-14 RX ADMIN — Medication 5 MG: at 10:08

## 2018-01-14 RX ADMIN — FENTANYL CITRATE 50 MCG: 50 INJECTION INTRAMUSCULAR; INTRAVENOUS at 10:42

## 2018-01-14 RX ADMIN — METOPROLOL TARTRATE 1 MG: 5 INJECTION, SOLUTION INTRAVENOUS at 10:44

## 2018-01-14 RX ADMIN — PHENYLEPHRINE HYDROCHLORIDE 100 MCG: 10 INJECTION, SOLUTION INTRAMUSCULAR; INTRAVENOUS; SUBCUTANEOUS at 09:48

## 2018-01-14 RX ADMIN — FENTANYL CITRATE 50 MCG: 50 INJECTION INTRAMUSCULAR; INTRAVENOUS at 10:11

## 2018-01-14 RX ADMIN — Medication 100 MG: at 08:56

## 2018-01-14 RX ADMIN — FENTANYL CITRATE 100 MCG: 50 INJECTION INTRAMUSCULAR; INTRAVENOUS at 08:39

## 2018-01-14 RX ADMIN — GLYCOPYRROLATE 0.8 MG: 0.2 INJECTION, SOLUTION INTRAMUSCULAR; INTRAVENOUS at 10:08

## 2018-01-14 RX ADMIN — FENTANYL CITRATE 50 MCG: 50 INJECTION INTRAMUSCULAR; INTRAVENOUS at 10:28

## 2018-01-14 RX ADMIN — HYDRALAZINE HYDROCHLORIDE 10 MG: 20 INJECTION INTRAMUSCULAR; INTRAVENOUS at 12:11

## 2018-01-14 RX ADMIN — FENTANYL CITRATE 50 MCG: 50 INJECTION INTRAMUSCULAR; INTRAVENOUS at 12:38

## 2018-01-14 RX ADMIN — HYDROXYZINE HYDROCHLORIDE 25 MG: 25 TABLET ORAL at 11:13

## 2018-01-14 RX ADMIN — Medication 3 G: at 08:33

## 2018-01-14 RX ADMIN — SODIUM CHLORIDE, POTASSIUM CHLORIDE, SODIUM LACTATE AND CALCIUM CHLORIDE: 600; 310; 30; 20 INJECTION, SOLUTION INTRAVENOUS at 08:33

## 2018-01-14 RX ADMIN — Medication 0.5 MG: at 11:15

## 2018-01-14 RX ADMIN — PHENYLEPHRINE HYDROCHLORIDE 100 MCG: 10 INJECTION, SOLUTION INTRAMUSCULAR; INTRAVENOUS; SUBCUTANEOUS at 09:18

## 2018-01-14 ASSESSMENT — LIFESTYLE VARIABLES: TOBACCO_USE: 1

## 2018-01-14 NOTE — ANESTHESIA CARE TRANSFER NOTE
Patient: Frank Flowers    Procedure(s):  Intramedullary Rodding of Left Tibia Fracture - Wound Class: I-Clean    Diagnosis: TIBIA FRACTURE  Diagnosis Additional Information: No value filed.    Anesthesia Type:   General     Note:  Airway :Face Mask  Patient transferred to:PACU  Handoff Report: Identifed the Patient, Identified the Reponsible Provider, Reviewed the pertinent medical history, Discussed the surgical course, Reviewed Intra-OP anesthesia mangement and issues during anesthesia, Set expectations for post-procedure period and Allowed opportunity for questions and acknowledgement of understanding      Vitals: (Last set prior to Anesthesia Care Transfer)    CRNA VITALS  1/14/2018 1000 - 1/14/2018 1030      1/14/2018             Pulse: 107    SpO2: 97 %                Electronically Signed By: WILLY Luong CRNA  January 14, 2018  10:30 AM

## 2018-01-14 NOTE — IP AVS SNAPSHOT
MRN:[id number]                      After Visit Summary   1/14/2018    Frank Flowers    MRN: [id number]           Thank you!     Thank you for choosing [facility] for your care. Our goal is always to provide you with excellent care. Hearing back from our patients is one way we can continue to improve our services. Please take a few minutes to complete the written survey that you may receive in the mail after you visit. If you would like to speak to someone directly about your visit please contact Patient Relations at [phone]. Thank you!          Patient Information     Date Of Birth          1980        About your hospital stay     You were admitted on:  January 14, 2018 You last received care in the:  [facility]est[facility]    You were discharged on:  January 14, 2018       Who to Call     For medical emergencies, please call 911.  For non-urgent questions about your medical care, please call your primary care provider or clinic, None  For questions related to your surgery, please call your surgery clinic        Attending Provider     Provider Specialty    Jian Burgos MD Orthopaedic Surgery       Primary Care Provider Fax #    Physician No Ref-Primary [phone]      After Care Instructions     Diet Instructions       Resume pre-procedure diet            Discharge Instructions       Patient to follow up with appointment in 2 weeks            Do not - immerse incision in water until sutures removed       Do not immerse incision in water until sutures removed            Dressing       Keep dressing clean and dry.  Dressing / incisional care as instructed by RN and or Surgeon            Ice to affected area       Ice to operative site PRN            No Alcohol       For 24 hours post procedure            No driving or operating machinery        until at least 3-4 days after surgery and off narcotics            No weight bearing                  Your next 10 appointments already scheduled     Jan 16, 2018  7:00 AM CST   New Visit with Sathish Duran DPM   [facility] ([facility] [place])    [address]  [address]  [facility] [address]   [phone]              Further instructions from your care team       DR. ELAINE REDDY M.D.  CLINIC PHONE NUMBER:  [phone]    -NO weight bearing  -No driving or operating machinery until at least 3-4 days after surgery and off narcotics  -Resume pre-procedure diet  -No alcohol for 24 hours post procedure  -Do NOT immerse incision in water until sutures removed  -Keep dressing clean and dry.   -Ice to operative site PRN  -Follow up in 2 weeks  -Elevate left leg at heart level 95% of the next 48 hours, then as needed for swelling      HOME CARE FOLLOWING MINOR SURGERY      DRESSING  Keep dressing dry and in place until your doctor instructs you to remove the dressing.    DRAINAGE  There should be minimal drainage. If bleeding should occur and soaks through the dressing apply a sterile, dry dressing over it and tape in place. If bleeding persists, apply gentle, steady pressure with your hand over the dressing for 5 minutes. If the bleeding does not stop, call your doctor.    SKIN CLOSURE  You may have stitches or special skin closures.  You may have stitches under the skin which will absorb. You may have steri-strips over the incision. These look like thin tapes and will peel off in 5-7 days. If they don t after 7 days, you may carefully remove them.     NOTIFY YOUR PHYSICIAN IF YOU HAVE ANY OF THE FOLLOWING SYMPTOMS:    1. Fever  2. Excessive bleeding or drainage  3. Disruption of the skin closure  4. Swelling, redness or excessive tenderness at the site  5. Severe pain  6. Drainage that is green, yellow, thick white or has a bad odor        GENERAL ANESTHESIA OR SEDATION ADULT DISCHARGE INSTRUCTIONS   SPECIAL PRECAUTIONS FOR 24 HOURS AFTER SURGERY    IT IS NOT UNUSUAL TO  FEEL LIGHT-HEADED OR FAINT, UP TO 24 HOURS AFTER SURGERY OR WHILE TAKING PAIN MEDICATION.  IF YOU HAVE THESE SYMPTOMS; SIT FOR A FEW MINUTES BEFORE STANDING AND HAVE SOMEONE ASSIST YOU WHEN YOU GET UP TO WALK OR USE THE BATHROOM.    YOU SHOULD REST AND RELAX FOR THE NEXT 24 HOURS AND YOU MUST MAKE ARRANGEMENTS TO HAVE SOMEONE STAY WITH YOU FOR AT LEAST 24 HOURS AFTER YOUR DISCHARGE.  AVOID HAZARDOUS AND STRENUOUS ACTIVITIES.  DO NOT MAKE IMPORTANT DECISIONS FOR 24 HOURS.    DO NOT DRIVE ANY VEHICLE OR OPERATE MECHANICAL EQUIPMENT FOR 24 HOURS FOLLOWING THE END OF YOUR SURGERY.  EVEN THOUGH YOU MAY FEEL NORMAL, YOUR REACTIONS MAY BE AFFECTED BY THE MEDICATION YOU HAVE RECEIVED.    DO NOT DRINK ALCOHOLIC BEVERAGES FOR 24 HOURS FOLLOWING YOUR SURGERY.    DRINK CLEAR LIQUIDS (APPLE JUICE, GINGER ALE, 7-UP, BROTH, ETC.).  PROGRESS TO YOUR REGULAR DIET AS YOU FEEL ABLE.    YOU MAY HAVE A DRY MOUTH, A SORE THROAT, MUSCLES ACHES OR TROUBLE SLEEPING.  THESE SHOULD GO AWAY AFTER 24 HOURS.    CALL YOUR DOCTOR FOR ANY OF THE FOLLOWING:  SIGNS OF INFECTION (FEVER, GROWING TENDERNESS AT THE SURGERY SITE, A LARGE AMOUNT OF DRAINAGE OR BLEEDING, SEVERE PAIN, FOUL-SMELLING DRAINAGE, REDNESS OR SWELLING.    IT HAS BEEN OVER 8 TO 10 HOURS SINCE SURGERY AND YOU ARE STILL NOT ABLE TO URINATE (PASS WATER).       MEDICATIONS YOU RECEIVED:  -1 Oxycodone (5 mg) was taken at 11:15 AM - Next dose @ 3:15 PM  -1 Hydroxyzine (25 mg) was taken at 11:15 AM - Next dose @ 5:15 PM  -You received Toradol, an IV form of ibuprofen (Motrin) at 10:45 AM.  Do not take any ibuprofen products until 4:45 PM.      Pending Results     No orders found from 1/12/2018 to 1/15/2018.            Admission Information     Date & Time Provider Department Dept. Phone    1/14/2018 Jian Burgos MD [facility] Post [facility]esthesia [facility] [phone]      Your Vitals Were     Blood Pressure Temperature Respirations Height Weight Pulse Oximetry    142/104 97.2  F  "(36.2  C) (Temporal) 11 1.778 m (5' 10\") 122.5 kg (270 lb) 93%    BMI (Body Mass Index)                   38.74 kg/m2           [employer] Information     [employer] lets you send messages to your doctor, view your test results, renew your prescriptions, schedule appointments and more. To sign up, go to [email or web address] . Click on \"Log in\" on the left side of the screen, which will take you to the Welcome page. Then click on \"Sign up Now\" on the right side of the page.     You will be asked to enter the access code listed below, as well as some personal information. Please follow the directions to create your username and password.     Your access code is: [email or web address]  Expires: 2018 10:54 AM     Your access code will  in 90 days. If you need help or a new code, please call your [place] clinic or [phone].        Care EveryWhere ID     This is your Care EveryWhere ID. This could be used by other organizations to access your [place] medical records  [id number]        Equal Access to Services     [facility] GILBERTO : Hadii anita Deleon, wabhanuda harjinder, qaybta kaalmada adezain, judy monterroso . So [facility] [phone].    ATENCIÓN: Si habla español, tiene a zarco disposición servicios gratuitos de asistencia lingüística. Mac al [phone].    We comply with applicable federal civil rights laws and [place] laws. We do not discriminate on the basis of race, color, national origin, age, disability, sex, sexual orientation, or gender identity.               Review of your medicines      UNREVIEWED medicines. Ask your doctor about these medicines        Dose / Directions    HYDROcodone-acetaminophen 5-325 MG per tablet   Commonly known as:  NORCO        Dose:  1 tablet   Take 1 tablet by mouth every 6 hours as needed for moderate to severe pain   Refills:  0         START taking        Dose / Directions    acetaminophen 325 MG tablet   Commonly known as:  TYLENOL   Used " for:  Closed fracture shaft fibula and tibia, left, initial encounter        Dose:  975 mg   Take 3 tablets (975 mg) by mouth every 6 hours as needed for pain   Quantity:  50 tablet   Refills:  0       gabapentin 300 MG capsule   Commonly known as:  NEURONTIN   Used for:  Closed fracture shaft fibula and tibia, left, initial encounter        Dose:  300 mg   Take 1 capsule (300 mg) by mouth 3 times daily   Quantity:  9 capsule   Refills:  0       hydrOXYzine 25 MG tablet   Commonly known as:  ATARAX   Used for:  Closed fracture shaft fibula and tibia, left, initial encounter        Dose:  25 mg   Take 1 tablet (25 mg) by mouth every 6 hours as needed for itching (and nausea)   Quantity:  30 tablet   Refills:  0       ibuprofen 600 MG tablet   Commonly known as:  ADVIL/MOTRIN   Used for:  Closed fracture shaft fibula and tibia, left, initial encounter        Dose:  600 mg   Take 1 tablet (600 mg) by mouth every 6 hours as needed for pain   Quantity:  50 tablet   Refills:  0       oxyCODONE IR 5 MG tablet   Commonly known as:  ROXICODONE   Used for:  Closed fracture shaft fibula and tibia, left, initial encounter        Dose:  5-10 mg   Take 1-2 tablets (5-10 mg) by mouth every 4 hours as needed for breakthrough pain   Quantity:  20 tablet   Refills:  0            Where to get your medicines      These medications were sent to [facility] [address] [address] [address]     Phone:  [phone]     acetaminophen 325 MG tablet    gabapentin 300 MG capsule    hydrOXYzine 25 MG tablet    ibuprofen 600 MG tablet         Some of these will need a paper prescription and others can be bought over the counter. Ask your nurse if you have questions.     Bring a paper prescription for each of these medications     oxyCODONE IR 5 MG tablet                Protect others around you: Learn how to safely use, store and throw away your medicines at  www.disposemymeds.org.             Medication List: This is a list of all your medications and when to take them. Check marks below indicate your daily home schedule. Keep this list as a reference.      Medications           Morning Afternoon Evening Bedtime As Needed    acetaminophen 325 MG tablet   Commonly known as:  TYLENOL   Take 3 tablets (975 mg) by mouth every 6 hours as needed for pain                                gabapentin 300 MG capsule   Commonly known as:  NEURONTIN   Take 1 capsule (300 mg) by mouth 3 times daily                                HYDROcodone-acetaminophen 5-325 MG per tablet   Commonly known as:  NORCO   Take 1 tablet by mouth every 6 hours as needed for moderate to severe pain                                hydrOXYzine 25 MG tablet   Commonly known as:  ATARAX   Take 1 tablet (25 mg) by mouth every 6 hours as needed for itching (and nausea)   Last time this was given:  25 mg on 1/14/2018 11:13 AM                                ibuprofen 600 MG tablet   Commonly known as:  ADVIL/MOTRIN   Take 1 tablet (600 mg) by mouth every 6 hours as needed for pain                                oxyCODONE IR 5 MG tablet   Commonly known as:  ROXICODONE   Take 1-2 tablets (5-10 mg) by mouth every 4 hours as needed for breakthrough pain   Last time this was given:  5 mg on 1/14/2018 11:13 AM

## 2018-01-14 NOTE — ANESTHESIA PREPROCEDURE EVALUATION
Anesthesia Evaluation     .             ROS/MED HX    ENT/Pulmonary:     (+)ROBB risk factors snores loudly, hypertension, obese, observed stopped breathing tobacco use, Current use , . .    Neurologic:       Cardiovascular:     (+) hypertension----. : . . . :. .       METS/Exercise Tolerance:     Hematologic:         Musculoskeletal:   (+) , fracture lower extremity: Ankle, -       GI/Hepatic:         Renal/Genitourinary:         Endo:         Psychiatric:     (+) psychiatric history anxiety and depression      Infectious Disease:         Malignancy:         Other:                     Physical Exam      Airway   Mallampati: III  TM distance: >3 FB  Neck ROM: full    Dental     Cardiovascular   Rhythm and rate: regular and normal      Pulmonary    breath sounds clear to auscultation                    Anesthesia Plan      History & Physical Review  History and physical reviewed and following examination; no interval change.    ASA Status:  3 .    NPO Status:  > 8 hours    Plan for General with Intravenous and Propofol induction. Maintenance will be Balanced.    PONV prophylaxis:  Ondansetron (or other 5HT-3) and Dexamethasone or Solumedrol       Postoperative Care  Postoperative pain management:  IV analgesics, Oral pain medications and Multi-modal analgesia.      Consents  Anesthetic plan, risks, benefits and alternatives discussed with:  Patient..                          .

## 2018-01-14 NOTE — OR NURSING
Dr. Smith notified that patient's blood pressure remains elevated in Phase II; BP still comparable to pre-op level.  Orders for Hydralazine 10 mg IV to be given.

## 2018-01-14 NOTE — BRIEF OP NOTE
[facility] Brief Operative Note    Pre-operative diagnosis: Closed tibial shaft fracture, left  Closed distal fibula fracture, left   Post-operative diagnosis Same   Procedure: Procedure(s):  Intramedullary Rodding of Left Tibia Fracture - Wound Class: I-Clean   Surgeon(s): Surgeon(s) and Role:     * Jian Burgos MD - Primary   Estimated blood loss: 50 mL    Specimens: none   Findings: ANES: General  IMPLANTS: Synthes 345 x 11

## 2018-01-14 NOTE — IP AVS SNAPSHOT
[facility] Post Anesthesia Care    [address]    [facility] [address]    Phone:  [phone]    Fax:  [phone]                                       After Visit Summary   1/14/2018    Frank Flowers    MRN: [id number]           After Visit Summary Signature Page     I have received my discharge instructions, and my questions have been answered. I have discussed any challenges I see with this plan with the nurse or doctor.    ..........................................................................................................................................  Patient/Patient Representative Signature      ..........................................................................................................................................  Patient Representative Print Name and Relationship to Patient    ..................................................               ................................................  Date                                            Time    ..........................................................................................................................................  Reviewed by Signature/Title    ...................................................              ..............................................  Date                                                            Time

## 2018-01-14 NOTE — ANESTHESIA POSTPROCEDURE EVALUATION
Patient: Frank Flowers    Procedure(s):  Intramedullary Rodding of Left Tibia Fracture - Wound Class: I-Clean    Diagnosis:TIBIA FRACTURE  Diagnosis Additional Information: Pre-operative diagnosis: Closed tibial shaft fracture, left  Closed distal fibula fracture, left  Post-operative diagnosis Same  Procedure: Procedure(s):  Intramedullary Rodding of Left Tibia Fracture         Anesthesia Type:  General    Note:  Anesthesia Post Evaluation    Patient location during evaluation: PACU  Patient participation: Able to fully participate in evaluation  Level of consciousness: awake  Pain management: adequate  Airway patency: patent  Cardiovascular status: acceptable  Respiratory status: acceptable  Hydration status: acceptable  PONV: controlled     Anesthetic complications: None          Last vitals:  Vitals:    01/14/18 1045 01/14/18 1100 01/14/18 1145   BP: (!) 159/116 (!) 142/104 (!) 158/102   Resp: 10 11 18   Temp:   98.3  F (36.8  C)   SpO2: 100% 93% 99%         Electronically Signed By: Artemio Smith MD  January 14, 2018  12:16 PM

## 2018-01-14 NOTE — DISCHARGE INSTRUCTIONS
DR. ELAINE REDDY M.D.  CLINIC PHONE NUMBER:  [phone]    -NO weight bearing  -No driving or operating machinery until at least 3-4 days after surgery and off narcotics  -Resume pre-procedure diet  -No alcohol for 24 hours post procedure  -Do NOT immerse incision in water until sutures removed  -Keep dressing clean and dry.   -Ice to operative site PRN  -Follow up in 2 weeks  -Elevate left leg at heart level 95% of the next 48 hours, then as needed for swelling      HOME CARE FOLLOWING MINOR SURGERY      DRESSING  Keep dressing dry and in place until your doctor instructs you to remove the dressing.    DRAINAGE  There should be minimal drainage. If bleeding should occur and soaks through the dressing apply a sterile, dry dressing over it and tape in place. If bleeding persists, apply gentle, steady pressure with your hand over the dressing for 5 minutes. If the bleeding does not stop, call your doctor.    SKIN CLOSURE  You may have stitches or special skin closures.  You may have stitches under the skin which will absorb. You may have steri-strips over the incision. These look like thin tapes and will peel off in 5-7 days. If they don t after 7 days, you may carefully remove them.     NOTIFY YOUR PHYSICIAN IF YOU HAVE ANY OF THE FOLLOWING SYMPTOMS:    1. Fever  2. Excessive bleeding or drainage  3. Disruption of the skin closure  4. Swelling, redness or excessive tenderness at the site  5. Severe pain  6. Drainage that is green, yellow, thick white or has a bad odor        GENERAL ANESTHESIA OR SEDATION ADULT DISCHARGE INSTRUCTIONS   SPECIAL PRECAUTIONS FOR 24 HOURS AFTER SURGERY    IT IS NOT UNUSUAL TO FEEL LIGHT-HEADED OR FAINT, UP TO 24 HOURS AFTER SURGERY OR WHILE TAKING PAIN MEDICATION.  IF YOU HAVE THESE SYMPTOMS; SIT FOR A FEW MINUTES BEFORE STANDING AND HAVE SOMEONE ASSIST YOU WHEN YOU GET UP TO WALK OR USE THE BATHROOM.    YOU SHOULD REST AND RELAX FOR THE NEXT 24 HOURS AND YOU MUST MAKE ARRANGEMENTS  TO HAVE SOMEONE STAY WITH YOU FOR AT LEAST 24 HOURS AFTER YOUR DISCHARGE.  AVOID HAZARDOUS AND STRENUOUS ACTIVITIES.  DO NOT MAKE IMPORTANT DECISIONS FOR 24 HOURS.    DO NOT DRIVE ANY VEHICLE OR OPERATE MECHANICAL EQUIPMENT FOR 24 HOURS FOLLOWING THE END OF YOUR SURGERY.  EVEN THOUGH YOU MAY FEEL NORMAL, YOUR REACTIONS MAY BE AFFECTED BY THE MEDICATION YOU HAVE RECEIVED.    DO NOT DRINK ALCOHOLIC BEVERAGES FOR 24 HOURS FOLLOWING YOUR SURGERY.    DRINK CLEAR LIQUIDS (APPLE JUICE, GINGER ALE, 7-UP, BROTH, ETC.).  PROGRESS TO YOUR REGULAR DIET AS YOU FEEL ABLE.    YOU MAY HAVE A DRY MOUTH, A SORE THROAT, MUSCLES ACHES OR TROUBLE SLEEPING.  THESE SHOULD GO AWAY AFTER 24 HOURS.    CALL YOUR DOCTOR FOR ANY OF THE FOLLOWING:  SIGNS OF INFECTION (FEVER, GROWING TENDERNESS AT THE SURGERY SITE, A LARGE AMOUNT OF DRAINAGE OR BLEEDING, SEVERE PAIN, FOUL-SMELLING DRAINAGE, REDNESS OR SWELLING.    IT HAS BEEN OVER 8 TO 10 HOURS SINCE SURGERY AND YOU ARE STILL NOT ABLE TO URINATE (PASS WATER).       MEDICATIONS YOU RECEIVED:  -1 Oxycodone (5 mg) was taken at 11:15 AM - Next dose @ 3:15 PM  -1 Hydroxyzine (25 mg) was taken at 11:15 AM - Next dose @ 5:15 PM  -You received Toradol, an IV form of ibuprofen (Motrin) at 10:45 AM.  Do not take any ibuprofen products until 4:45 PM.

## 2018-01-15 NOTE — OP NOTE
DATE OF SERVICE:  01/14/2018      PREOPERATIVE DIAGNOSES:   1.  Closed tibial shaft fracture, left.   2.  Closed distal fibular fracture, left.      POSTOPERATIVE DIAGNOSES:   1.  Closed tibial shaft fracture, left.   2.  Closed distal fibular fracture, left.      PROCEDURE:  Intramedullary nailing, left tibial shaft fracture.      IMPLANTS:  Synthes (345 x 11).      SURGEON:  Jian Burgos MD      ASSISTANT:  None.      ANESTHESIA:  General.      ESTIMATED BLOOD LOSS:  50 cc.      DRAINS:  None.      SPECIMENS:  None.      COMPLICATIONS:  None.      INDICATIONS FOR PROCEDURE:  Frank Flowers is a 37-year-old male who received severe injury to his left leg approximately one week ago.  [place] and waited until he returned home to [place] to be further evaluated.  He was seen in urgent care on Friday, two days prior to surgery.  At that point, surgical intervention was recommended.  Risks, benefits and recovery were discussed, and he elected to proceed.      PROCEDURE IN DETAIL AND FINDINGS:  The patient was identified in the preoperative area and appropriately marked.  He was brought to the operating room where general anesthetic was administered and airway secured without difficulty.  Preoperative antibiotic prophylaxis was provided within an hour of the incision.  Prophylaxis was discontinued the day of surgery.  A tourniquet was placed on the left thigh, but was never inflated during the case.  The left leg was prepped and draped in sterile fashion.  Pause for the cause was performed.      I made a medial paramidline incision along the patellar tendon.  The starting point for the tibial nail was identified and exposed.  A guide pin was placed, and position checked with x-ray.  The initial entry reamer was used, and then the ball-tipped guide jaylan passed down to the level of the diaphysis.      At this point, I took some time to reduce and position the leg.  I did use a percutaneous  reduction clamp, as well as some Coban and towels to position the leg appropriately.  When I was happy with the reduction, I passed a guide wire down into the distal aspect of the tibia.  I then reamed sequentially up to a 12, and measured for length of the nail.  A 345 mm x 11 mm nail was chosen and placed.  Reduction was maintained.  AP and lateral views were checked.  When I was happy with this, I secured the distal fragment with three interlock screws.  I then placed a single static interlock screw proximally.      AP and lateral views of the tib-fib area on the left were taken and reviewed.  Although there was some distraction on the lateral view of the fibula fracture, the general alignment was quite good, and the fracture itself was very long and comminuted.  I did not feel at this point that surgical reduction and fixation of this was necessary or would necessarily be a good risk/benefit tradeoff.  Therefore, I elected to pursue nonoperative treatment of the fibula fracture.      The wounds were then irrigated and closed in layers.  Adaptic, sterile dressings, and a well-padded short leg splint were applied.      The patient tolerated the procedure well.  There were no complications.  All sponge and needle counts were correct.      PLAN:  He will be nonweightbearing in a splint.  He will return in 2 weeks for splint off, wound check, sutures out, and placement either of an Aircast boot or of a short leg cast.  Some of this will depend on the level of swelling and also discomfort in his fibula.      In either case, he will continue to be nonweightbearing.  He will follow up at 6 weeks for AP, mortise and lateral views of the left ankle, as well as AP and lateral views of the left knee.         ELAINE REDDY MD             D: 2018 13:28   T: 01/15/2018 02:04   MT: SAGAR#[id number]      Name:     LARA TEJADA   MRN:      [id number]        Account:        [id number]   :      1980            Procedure Date: 01/14/2018      Document: [id number]       cc: Raymond Menezes MD

## 2018-01-16 LAB — INTERPRETATION ECG - MUSE: NORMAL

## 2018-04-12 ENCOUNTER — OFFICE VISIT (OUTPATIENT)
Dept: INTERNAL MEDICINE | Facility: CLINIC | Age: 38
End: 2018-04-12
Payer: COMMERCIAL

## 2018-04-12 VITALS
OXYGEN SATURATION: 98 % | TEMPERATURE: 98.1 F | HEART RATE: 109 BPM | BODY MASS INDEX: 39.18 KG/M2 | SYSTOLIC BLOOD PRESSURE: 140 MMHG | WEIGHT: 273.7 LBS | HEIGHT: 70 IN | DIASTOLIC BLOOD PRESSURE: 110 MMHG

## 2018-04-12 DIAGNOSIS — Z00.00 ENCOUNTER FOR ROUTINE ADULT HEALTH EXAMINATION WITHOUT ABNORMAL FINDINGS: Primary | ICD-10-CM

## 2018-04-12 DIAGNOSIS — I10 ESSENTIAL HYPERTENSION: ICD-10-CM

## 2018-04-12 DIAGNOSIS — R06.83 SNORING: ICD-10-CM

## 2018-04-12 LAB — HGB BLD-MCNC: 15.5 G/DL (ref 13.3–17.7)

## 2018-04-12 PROCEDURE — 36415 COLL VENOUS BLD VENIPUNCTURE: CPT | Performed by: INTERNAL MEDICINE

## 2018-04-12 PROCEDURE — 99395 PREV VISIT EST AGE 18-39: CPT | Performed by: INTERNAL MEDICINE

## 2018-04-12 PROCEDURE — 80061 LIPID PANEL: CPT | Performed by: INTERNAL MEDICINE

## 2018-04-12 PROCEDURE — 85018 HEMOGLOBIN: CPT | Performed by: INTERNAL MEDICINE

## 2018-04-12 PROCEDURE — 80053 COMPREHEN METABOLIC PANEL: CPT | Performed by: INTERNAL MEDICINE

## 2018-04-12 PROCEDURE — 99213 OFFICE O/P EST LOW 20 MIN: CPT | Mod: 25 | Performed by: INTERNAL MEDICINE

## 2018-04-12 RX ORDER — LISINOPRIL AND HYDROCHLOROTHIAZIDE 20; 25 MG/1; MG/1
1 TABLET ORAL DAILY
Qty: 30 TABLET | Refills: 0 | Status: SHIPPED | OUTPATIENT
Start: 2018-04-12 | End: 2018-04-25

## 2018-04-12 NOTE — NURSING NOTE
"Chief Complaint   Patient presents with     Physical       Initial BP (!) 140/110 (Cuff Size: Adult Large)  Pulse 109  Temp 98.1  F (36.7  C) (Oral)  Ht 5' 10\" (1.778 m)  Wt 273 lb 11.2 oz (124.1 kg)  SpO2 98%  BMI 39.27 kg/m2 Estimated body mass index is 39.27 kg/(m^2) as calculated from the following:    Height as of this encounter: 5' 10\" (1.778 m).    Weight as of this encounter: 273 lb 11.2 oz (124.1 kg).  Medication Reconciliation: complete    "

## 2018-04-12 NOTE — PROGRESS NOTES
SUBJECTIVE:   CC: Frank Flowers is an 37 year old male who presents for preventative health visit.     Physical   Annual:     Getting at least 3 servings of Calcium per day::  Yes    Bi-annual eye exam::  Yes    Dental care twice a year::  NO    Sleep apnea or symptoms of sleep apnea::  Sleep apnea    Diet::  Regular (no restrictions)    Frequency of exercise::  1 day/week    Duration of exercise::  15-30 minutes    Taking medications regularly::  Yes    Medication side effects::  None    Additional concerns today::  No            Pt is here to follow up on Elevated BP. Not on any meds at this time. Was on diuretic in the past. H/o smoking 1ppd since age 17   Pt c/o snoring since 2 yrs and also c/o waking up catching for breath, no daytime fatigue.       Today's PHQ-2 Score:   PHQ-2 ( 1999 Pfizer) 4/12/2018   Q1: Little interest or pleasure in doing things 0   Q2: Feeling down, depressed or hopeless 1   PHQ-2 Score 1   Q1: Little interest or pleasure in doing things Not at all   Q2: Feeling down, depressed or hopeless Several days   PHQ-2 Score 1       Abuse: Current or Past(Physical, Sexual or Emotional)- No  Do you feel safe in your environment - Yes      Past Medical History:   Diagnosis Date     Hypertension        Past Surgical History:   Procedure Laterality Date     DENTAL SURGERY      abscessed tooth removed under anesthesia     OPEN REDUCTION INTERNAL FIXATION RODDING INTRAMEDULLARY TIBIA Left 1/14/2018    Procedure: OPEN REDUCTION INTERNAL FIXATION RODDING INTRAMEDULLARY TIBIA;  Intramedullary nailing, left tibial shaft fracture;  Surgeon: Jian Burgos MD;  Location: [facility] OR       Current Outpatient Prescriptions   Medication Sig Dispense Refill     lisinopril-hydrochlorothiazide (PRINZIDE/ZESTORETIC) 20-25 MG per tablet Take 1 tablet by mouth daily 30 tablet 0       Family History   Problem Relation Age of Onset     Depression Mother      Anxiety Disorder Mother      Unknown/Adopted Father       Unknown/Adopted Maternal Grandmother      Unknown/Adopted Maternal Grandfather      Unknown/Adopted Paternal Grandmother      Unknown/Adopted Paternal Grandfather      Unknown/Adopted Brother      Anxiety Disorder Sister        Social History   Substance Use Topics     Smoking status: Current Every Day Smoker     Packs/day: 0.50     Smokeless tobacco: Never Used     Alcohol use Yes      Comment: Occas     Alcohol Use 4/12/2018   If you drink alcohol do you typically have greater than 3 drinks per day OR greater than 7 drinks per week? Yes   AUDIT SCORE  8     AUDIT - Alcohol Use Disorders Identification Test - Reproduced from the World Health Organization Audit 2001 (Second Edition) 4/12/2018   1.  How often do you have a drink containing alcohol? 4 or more times a week   2.  How many drinks containing alcohol do you have on a typical day when you are drinking? 3 or 4   3.  How often do you have five or more drinks on one occasion? Weekly   4.  How often during the last year have you found that you were not able to stop drinking once you had started? Never   5.  How often during the last year have you failed to do what was normally expected of you because of drinking? Never   6.  How often during the last year have you needed a first drink in the morning to get yourself going after a heavy drinking session? Never   7.  How often during the last year have you had a feeling of guilt or remorse after drinking? Never   8.  How often during the last year have you been unable to remember what happened the night before because of your drinking? Never   9.  Have you or someone else been injured because of your drinking? No   10. Has a relative, friend, doctor or other health care worker been concerned about your drinking or suggested you cut down? No   TOTAL SCORE 8       Last PSA: No results found for: PSA    Reviewed orders with patient. Reviewed health maintenance and updated orders accordingly - Yes      Reviewed  "and updated as needed this visit by clinical staff         Reviewed and updated as needed this visit by Provider            Review of Systems  C: NEGATIVE for fever, chills, change in weight  I: NEGATIVE for worrisome rashes, moles or lesions  E: NEGATIVE for vision changes or irritation  ENT: NEGATIVE for ear, mouth and throat problems  R: snoring, NEGATIVE for significant cough or SOB  CV: NEGATIVE for chest pain, palpitations or peripheral edema  GI: NEGATIVE for nausea, abdominal pain, heartburn, or change in bowel habits   male: negative for dysuria, hematuria, decreased urinary stream, erectile dysfunction, urethral discharge  M: NEGATIVE for significant arthralgias or myalgia  N: NEGATIVE for weakness, dizziness or paresthesias  P: NEGATIVE for changes in mood or affect    OBJECTIVE:   BP (!) 140/110 (Cuff Size: Adult Large)  Pulse 109  Temp 98.1  F (36.7  C) (Oral)  Ht 5' 10\" (1.778 m)  Wt 273 lb 11.2 oz (124.1 kg)  SpO2 98%  BMI 39.27 kg/m2    Physical Exam  GENERAL: healthy, alert and no distress  EYES: Eyes grossly normal to inspection, PERRL and conjunctivae and sclerae normal  HENT: ear canals and TM's normal, nose and mouth without ulcers or lesions  NECK: no adenopathy, no asymmetry, masses, or scars and thyroid normal to palpation  RESP: lungs clear to auscultation - no rales, rhonchi or wheezes  CV: regular rate and rhythm, normal S1 S2, no S3 or S4, no murmur, click or rub, no peripheral edema and peripheral pulses strong  ABDOMEN: soft, nontender, no hepatosplenomegaly, no masses and bowel sounds normal  MS: no gross musculoskeletal defects noted, no edema  NEURO: Normal strength and tone, mentation intact and speech normal  PSYCH: mentation appears normal, affect normal/bright    ASSESSMENT/PLAN:     (Z00.00) Encounter for routine adult health examination without abnormal findings  (primary encounter diagnosis  Plan: Hemoglobin, Comprehensive metabolic panel,         Lipid panel reflex to " "direct LDL Fasting            (I10) Essential hypertension  Comment: BP elevated   Plan: started on lisinopril-hydrochlorothiazide (PRINZIDE/ZESTORETIC) 20-25 MG per tablet as directed.explained clearly about the medication,insructions and side effects.  Will get  US Renal Complete w Doppler Complete,             (R06.83) Snoring  Plan: SLEEP EVALUATION & MANAGEMENT REFERRAL - ADULT         -[facility]         [phone]  (Age 18 and up)             COUNSELING:   Reviewed preventive health counseling, as reflected in patient instructions       Regular exercise       Healthy diet/nutrition         reports that he has been smoking.  He has been smoking about 0.50 packs per day. He has never used smokeless tobacco.  Tobacco Cessation Action Plan: Information offered: Patient not interested at this time pt will try to quit on his own.  Estimated body mass index is 38.74 kg/(m^2) as calculated from the following:    Height as of 1/14/18: 5' 10\" (1.778 m).    Weight as of 1/14/18: 270 lb (122.5 kg).   Weight management plan: Discussed healthy diet and exercise guidelines and patient will follow up in 12 months in clinic to re-evaluate.    Counseling Resources:  ATP IV Guidelines  Pooled Cohorts Equation Calculator  FRAX Risk Assessment  ICSI Preventive Guidelines  Dietary Guidelines for Americans, 2010  USDA's MyPlate  ASA Prophylaxis  Lung CA Screening    Yolanda Parikh MD  [facility]  Answers for HPI/ROS submitted by the patient on 4/12/2018   PHQ-2 Score: 1    "

## 2018-04-12 NOTE — MR AVS SNAPSHOT
After Visit Summary   4/12/2018    Frank Flowers    MRN: [id number]           Patient Information     Date Of Birth          1980        Visit Information        Provider Department      4/12/2018 9:20 AM Yolanda Parikh MD [facility]        Today's Diagnoses     Encounter for routine adult health examination without abnormal findings    -  1    Essential hypertension        Snoring           Follow-ups after your visit        Additional Services     SLEEP EVALUATION & MANAGEMENT REFERRAL - ADULT [facility] [phone]  (Age 18 and up)       Please be aware that coverage of these services is subject to the terms and limitations of your health insurance plan.  Call member services at your health plan with any benefit or coverage questions.      Please bring the following to your appointment:    >>   List of current medications   >>   This referral request   >>   Any documents/labs given to you for this referral                      Your next 10 appointments already scheduled     Apr 25, 2018  9:40 AM CDT   SHORT with Yolanda Parikh MD   [facility] ([facility])    [facility] [address] [facility] [id number] [phone]              Future tests that were ordered for you today     Open Future Orders        Priority Expected Expires Ordered    US Renal Complete w Doppler Complete Routine  4/13/2019 4/13/2018    SLEEP EVALUATION & MANAGEMENT REFERRAL - [facility] [phone]  (Age 18 and up) Routine  4/13/2019 4/13/2018            Who to contact     If you have questions or need follow up information about today's clinic visit or your schedule please contact [facility] directly at [phone].  Normal or non-critical lab and imaging results will be communicated to you by MyChart, letter or phone within 4 business days after the clinic has  "received the results. If you do not hear from us within 7 days, please contact the clinic through [employer] or phone. If you have a critical or abnormal lab result, we will notify you by phone as soon as possible.  Submit refill requests through [employer] or call your pharmacy and they will forward the refill request to us. Please allow 3 business days for your refill to be completed.          Additional Information About Your Visit        [employer] Information     [employer] lets you send messages to your doctor, view your test results, renew your prescriptions, schedule appointments and more. To sign up, go to [email or web address] . Click on \"Log in\" on the left side of the screen, which will take you to the Welcome page. Then click on \"Sign up Now\" on the right side of the page.     You will be asked to enter the access code listed below, as well as some personal information. Please follow the directions to create your username and password.     Your access code is: [email or web address]  Expires: 2018 11:54 AM     Your access code will  in 90 days. If you need help or a new code, please call your [place] clinic or [phone].        Care EveryWhere ID     This is your Care EveryWhere ID. This could be used by other organizations to access your [place] medical records  [id number]        Your Vitals Were     Pulse Temperature Height Pulse Oximetry BMI (Body Mass Index)       109 98.1  F (36.7  C) (Oral) 5' 10\" (1.778 m) 98% 39.27 kg/m2        Blood Pressure from Last 3 Encounters:   18 (!) 140/110   18 (!) 137/113   10/06/17 (!) 156/108    Weight from Last 3 Encounters:   18 273 lb 11.2 oz (124.1 kg)   18 270 lb (122.5 kg)   10/06/17 264 lb 8 oz (120 kg)              We Performed the Following     Comprehensive metabolic panel     Hemoglobin     Lipid panel reflex to direct LDL Fasting     OFFICE/OUTPT VISIT,EST,LEVL III          Today's Medication Changes          These changes are " accurate as of 4/12/18 11:59 PM.  If you have any questions, ask your nurse or doctor.               Start taking these medicines.        Dose/Directions    lisinopril-hydrochlorothiazide 20-25 MG per tablet   Commonly known as:  PRINZIDE/ZESTORETIC   Used for:  Essential hypertension   Started by:  Yolanda Parikh MD        Dose:  1 tablet   Take 1 tablet by mouth daily   Quantity:  30 tablet   Refills:  0            Where to get your medicines      These medications were sent to [facility] [address] [facility].  [address] [facility] [facility] [address]     Phone:  [phone]     lisinopril-hydrochlorothiazide 20-25 MG per tablet                Primary Care Provider Fax #    Physician No Ref-Primary [phone]       No address on file        Equal Access to Services     ALIN MACIAS : Sangita Deleon, waaxguillermina luqadaha, qaybta kaalmada hannah, judy monterroso . So [facility] [phone].    ATENCIÓN: Si habla español, tiene a zarco disposición servicios gratuitos de asistencia lingüística. Mac al [phone].    We comply with applicable federal civil rights laws and [place] laws. We do not discriminate on the basis of race, color, national origin, age, disability, sex, sexual orientation, or gender identity.            Thank you!     Thank you for choosing [facility]  for your care. Our goal is always to provide you with excellent care. Hearing back from our patients is one way we can continue to improve our services. Please take a few minutes to complete the written survey that you may receive in the mail after your visit with us. Thank you!             Your Updated Medication List - Protect others around you: Learn how to safely use, store and throw away your medicines at www.disposemymeds.org.          This list is accurate as of 4/12/18 11:59 PM.  Always use your most recent med list.                    Brand Name Dispense Instructions for use Diagnosis    lisinopril-hydrochlorothiazide 20-25 MG per tablet    PRINZIDE/ZESTORETIC    30 tablet    Take 1 tablet by mouth daily    Essential hypertension

## 2018-04-13 LAB
ALBUMIN SERPL-MCNC: 4 G/DL (ref 3.4–5)
ALP SERPL-CCNC: 102 U/L (ref 40–150)
ALT SERPL W P-5'-P-CCNC: 90 U/L (ref 0–70)
ANION GAP SERPL CALCULATED.3IONS-SCNC: 6 MMOL/L (ref 3–14)
AST SERPL W P-5'-P-CCNC: 45 U/L (ref 0–45)
BILIRUB SERPL-MCNC: 0.6 MG/DL (ref 0.2–1.3)
BUN SERPL-MCNC: 12 MG/DL (ref 7–30)
CALCIUM SERPL-MCNC: 9.1 MG/DL (ref 8.5–10.1)
CHLORIDE SERPL-SCNC: 105 MMOL/L (ref 94–109)
CHOLEST SERPL-MCNC: 233 MG/DL
CO2 SERPL-SCNC: 28 MMOL/L (ref 20–32)
CREAT SERPL-MCNC: 0.94 MG/DL (ref 0.66–1.25)
GFR SERPL CREATININE-BSD FRML MDRD: 89 ML/MIN/1.7M2
GLUCOSE SERPL-MCNC: 99 MG/DL (ref 70–99)
HDLC SERPL-MCNC: 41 MG/DL
LDLC SERPL CALC-MCNC: 168 MG/DL
NONHDLC SERPL-MCNC: 192 MG/DL
POTASSIUM SERPL-SCNC: 4.3 MMOL/L (ref 3.4–5.3)
PROT SERPL-MCNC: 8 G/DL (ref 6.8–8.8)
SODIUM SERPL-SCNC: 139 MMOL/L (ref 133–144)
TRIGL SERPL-MCNC: 118 MG/DL

## 2018-04-24 ENCOUNTER — HOSPITAL ENCOUNTER (OUTPATIENT)
Dept: ULTRASOUND IMAGING | Facility: CLINIC | Age: 38
Discharge: HOME | End: 2018-04-24
Attending: INTERNAL MEDICINE | Admitting: INTERNAL MEDICINE
Payer: COMMERCIAL

## 2018-04-24 DIAGNOSIS — I10 ESSENTIAL HYPERTENSION: ICD-10-CM

## 2018-04-24 PROCEDURE — 76770 US EXAM ABDO BACK WALL COMP: CPT

## 2018-04-25 ENCOUNTER — OFFICE VISIT (OUTPATIENT)
Dept: INTERNAL MEDICINE | Facility: CLINIC | Age: 38
End: 2018-04-25
Payer: COMMERCIAL

## 2018-04-25 VITALS
BODY MASS INDEX: 38.58 KG/M2 | HEART RATE: 90 BPM | WEIGHT: 269.5 LBS | DIASTOLIC BLOOD PRESSURE: 86 MMHG | SYSTOLIC BLOOD PRESSURE: 136 MMHG | RESPIRATION RATE: 16 BRPM | TEMPERATURE: 97.6 F | OXYGEN SATURATION: 98 % | HEIGHT: 70 IN

## 2018-04-25 DIAGNOSIS — I10 ESSENTIAL HYPERTENSION: ICD-10-CM

## 2018-04-25 DIAGNOSIS — Z23 NEED FOR TDAP VACCINATION: Primary | ICD-10-CM

## 2018-04-25 DIAGNOSIS — E78.2 MIXED HYPERLIPIDEMIA: ICD-10-CM

## 2018-04-25 PROCEDURE — 90715 TDAP VACCINE 7 YRS/> IM: CPT | Performed by: INTERNAL MEDICINE

## 2018-04-25 PROCEDURE — 99213 OFFICE O/P EST LOW 20 MIN: CPT | Performed by: INTERNAL MEDICINE

## 2018-04-25 RX ORDER — LISINOPRIL AND HYDROCHLOROTHIAZIDE 20; 25 MG/1; MG/1
1 TABLET ORAL DAILY
Qty: 90 TABLET | Refills: 1 | Status: SHIPPED | OUTPATIENT
Start: 2018-04-25 | End: 2018-12-12 | Stop reason: ALTCHOICE

## 2018-04-25 ASSESSMENT — PAIN SCALES - GENERAL: PAINLEVEL: NO PAIN (0)

## 2018-04-25 NOTE — NURSING NOTE
Screening Questionnaire for Adult Immunization    Are you sick today?   No   Do you have allergies to medications, food, a vaccine component or latex?   No   Have you ever had a serious reaction after receiving a vaccination?   No   Do you have a long-term health problem with heart disease, lung disease, asthma, kidney disease, metabolic disease (e.g. diabetes), anemia, or other blood disorder?   No   Do you have cancer, leukemia, HIV/AIDS, or any other immune system problem?   No   In the past 3 months, have you taken medications that affect  your immune system, such as prednisone, other steroids, or anticancer drugs; drugs for the treatment of rheumatoid arthritis, Crohn s disease, or psoriasis; or have you had radiation treatments?   No   Have you had a seizure, or a brain or other nervous system problem?   No   During the past year, have you received a transfusion of blood or blood     products, or been given immune (gamma) globulin or antiviral drug?   No   For women: Are you pregnant or is there a chance you could become        pregnant during the next month?   No   Have you received any vaccinations in the past 4 weeks?   No     Immunization questionnaire answers were all negative.        Per orders of Dr. Parikh, injection of Tdap given by Tatiana Duenas. Patient instructed to remain in clinic for 15 minutes afterwards, and to report any adverse reaction to me immediately.       Screening performed by Tatiana Duenas on 4/25/2018 at 9:52 AM.

## 2018-04-25 NOTE — MR AVS SNAPSHOT
"              After Visit Summary   4/25/2018    Frank Flowers    MRN: [id number]           Patient Information     Date Of Birth          1980        Visit Information        Provider Department      4/25/2018 9:40 AM Yolanda Parikh MD [facility]        Today's Diagnoses     Need for Tdap vaccination    -  1    Essential hypertension        Mixed hyperlipidemia           Follow-ups after your visit        Who to contact     If you have questions or need follow up information about today's clinic visit or your schedule please contact [facility] directly at [phone].  Normal or non-critical lab and imaging results will be communicated to you by MyChart, letter or phone within 4 business days after the clinic has received the results. If you do not hear from us within 7 days, please contact the clinic through MyChart or phone. If you have a critical or abnormal lab result, we will notify you by phone as soon as possible.  Submit refill requests through [employer] or call your pharmacy and they will forward the refill request to us. Please allow 3 business days for your refill to be completed.          Additional Information About Your Visit        Care EveryWhere ID     This is your Care EveryWhere ID. This could be used by other organizations to access your [facility] medical records  [id number]        Your Vitals Were     Pulse Temperature Respirations Height Pulse Oximetry BMI (Body Mass Index)    90 97.6  F (36.4  C) (Oral) 16 5' 10\" (1.778 m) 98% 38.67 kg/m2       Blood Pressure from Last 3 Encounters:   04/25/18 136/86   04/12/18 (!) 140/110   01/14/18 (!) 137/113    Weight from Last 3 Encounters:   04/25/18 269 lb 8 oz (122.2 kg)   04/12/18 273 lb 11.2 oz (124.1 kg)   01/14/18 270 lb (122.5 kg)              We Performed the Following     TDAP VACCINE (ADACEL)          Where to get your medicines      These medications were sent to [facility] - [place], [place] - [address]  [address] [facility] [address]     Phone:  [phone]     lisinopril-hydrochlorothiazide 20-25 MG per tablet          Primary Care Provider Office Phone # Fax #    Yolanda GARCIA MD Jl [phone]       [address]  [facility] [address]        Equal Access to Services     ALIN [facility] : Hadii aad ku hadasho Soomaali, waaxda luqadaha, qaybta kaalmada adeegyada, waxay idiin hayaan adeeg kharash la'aan . So [facility] [phone].    ATENCIÓN: Si habla español, tiene a zarco disposición servicios gratuitos de asistencia lingüística. [facility] [phone].    We comply with applicable federal civil rights laws and [place] laws. We do not discriminate on the basis of race, color, national origin, age, disability, sex, sexual orientation, or gender identity.            Thank you!     Thank you for choosing [facility]  for your care. Our goal is always to provide you with excellent care. Hearing back from our patients is one way we can continue to improve our services. Please take a few minutes to complete the written survey that you may receive in the mail after your visit with us. Thank you!             Your Updated Medication List - Protect others around you: Learn how to safely use, store and throw away your medicines at www.disposemymeds.org.          This list is accurate as of 4/25/18 10:14 AM.  Always use your most recent med list.                   Brand Name Dispense Instructions for use Diagnosis    lisinopril-hydrochlorothiazide 20-25 MG per tablet    PRINZIDE/ZESTORETIC    90 tablet    Take 1 tablet by mouth daily    Essential hypertension

## 2018-04-25 NOTE — NURSING NOTE
"Chief Complaint   Patient presents with     RECHECK     Hypertension       Initial /86 (BP Location: Left arm, Patient Position: Chair, Cuff Size: Adult Large)  Pulse 90  Temp 97.6  F (36.4  C) (Oral)  Resp 16  Ht 5' 10\" (1.778 m)  Wt 269 lb 8 oz (122.2 kg)  SpO2 98%  BMI 38.67 kg/m2 Estimated body mass index is 38.67 kg/(m^2) as calculated from the following:    Height as of this encounter: 5' 10\" (1.778 m).    Weight as of this encounter: 269 lb 8 oz (122.2 kg).  Medication Reconciliation: complete    "

## 2018-04-25 NOTE — PROGRESS NOTES
SUBJECTIVE:   Frank Flowers is a 37 year old male who presents to clinic today for the following health issues:      Hypertension Follow-up      Outpatient blood pressures are not being checked. He was started on lisinopril hydrochlorothiazide 20/25 at last office visit, doing well.    Low Salt Diet: low salt      Amount of exercise or physical activity: walking  (LEG FX 1-2018)    Problems taking medications regularly: No    Medication side effects: none    Diet: low salt         Patient Active Problem List   Diagnosis     Essential hypertension     Past Surgical History:   Procedure Laterality Date     DENTAL SURGERY      abscessed tooth removed under anesthesia     OPEN REDUCTION INTERNAL FIXATION RODDING INTRAMEDULLARY TIBIA Left 1/14/2018    Procedure: OPEN REDUCTION INTERNAL FIXATION RODDING INTRAMEDULLARY TIBIA;  Intramedullary nailing, left tibial shaft fracture;  Surgeon: Jian Burgos MD;  Location:  OR       Social History   Substance Use Topics     Smoking status: Current Every Day Smoker     Packs/day: 0.50     Types: Cigarettes     Smokeless tobacco: Never Used     Alcohol use Yes      Comment: Occas     Family History   Problem Relation Age of Onset     Depression Mother      Anxiety Disorder Mother      Unknown/Adopted Father      Unknown/Adopted Maternal Grandmother      Unknown/Adopted Maternal Grandfather      Unknown/Adopted Paternal Grandmother      Unknown/Adopted Paternal Grandfather      Unknown/Adopted Brother      Anxiety Disorder Sister          Current Outpatient Prescriptions   Medication Sig Dispense Refill     lisinopril-hydrochlorothiazide (PRINZIDE/ZESTORETIC) 20-25 MG per tablet Take 1 tablet by mouth daily 30 tablet 0       Reviewed and updated as needed this visit by clinical staff  Tobacco  Allergies  Meds  Med Hx  Surg Hx  Fam Hx  Soc Hx      Reviewed and updated as needed this visit by Provider         ROS:  CONSTITUTIONAL: NEGATIVE for fever, chills,  "change in weight  ENT/MOUTH: NEGATIVE for ear, mouth and throat problems  RESP: NEGATIVE for significant cough or SOB  CV: NEGATIVE for chest pain, palpitations or peripheral edema  NEURO: NEGATIVE for weakness, dizziness or paresthesias    OBJECTIVE:                                                    /86 (BP Location: Left arm, Patient Position: Chair, Cuff Size: Adult Large)  Pulse 90  Temp 97.6  F (36.4  C) (Oral)  Resp 16  Ht 5' 10\" (1.778 m)  Wt 269 lb 8 oz (122.2 kg)  SpO2 98%  BMI 38.67 kg/m2  Body mass index is 38.67 kg/(m^2).   GENERAL: healthy, alert, well nourished, well hydrated, no distress  RESP: lungs clear to auscultation - no rales, no rhonchi, no wheezes  CV: regular rates and rhythm,    MS: extremities- no calf tenderness         ASSESSMENT/PLAN:                                                      (I10) Essential hypertension  Comment: Blood pressure controlled  Plan: Continue lisinopril-hydrochlorothiazide (PRINZIDE/ZESTORETIC) 20-25 MG per tablet as directed .explained clearly about the medication,insructions and side effects.  Advised to follow low salt diet and exercise and f/u in 6 mths.       (E78.2) Mixed hyperlipidemia  Plan: The nature of cardiac risk has been fully discussed with this patient.  The benefits of lowering the cholesterol and improving the balance of lipids in view of the patient's age and risk status have been discussed, as well as verbal review of appropriate diet.  The need for lifelong general compliance in order to reduce risk is stressed.  A gradual exercise program is recommended to help maintain normal body weight and improve lipid balance. Repeat lipids in 6 months.    Slightly elevated ALT, probably fatty liver, advised to avoid any Tylenol products, alcohol, follow low-fat diet regular exercise. Will repeat liver tests in 8 weeks    (Z23) Need for Tdap vaccination    Plan: TDAP VACCINE (ADACEL)            Yolanda Parikh MD  [place] " [facility]

## 2018-12-12 ENCOUNTER — OFFICE VISIT (OUTPATIENT)
Dept: INTERNAL MEDICINE | Facility: CLINIC | Age: 38
End: 2018-12-12
Payer: COMMERCIAL

## 2018-12-12 VITALS
BODY MASS INDEX: 37.34 KG/M2 | OXYGEN SATURATION: 98 % | RESPIRATION RATE: 15 BRPM | HEIGHT: 70 IN | DIASTOLIC BLOOD PRESSURE: 98 MMHG | HEART RATE: 107 BPM | SYSTOLIC BLOOD PRESSURE: 124 MMHG | WEIGHT: 260.8 LBS

## 2018-12-12 DIAGNOSIS — E78.2 MIXED HYPERLIPIDEMIA: Primary | ICD-10-CM

## 2018-12-12 DIAGNOSIS — E66.01 MORBID OBESITY (H): ICD-10-CM

## 2018-12-12 DIAGNOSIS — R94.5 ABNORMAL RESULTS OF LIVER FUNCTION STUDIES: ICD-10-CM

## 2018-12-12 DIAGNOSIS — I10 ESSENTIAL HYPERTENSION: ICD-10-CM

## 2018-12-12 PROCEDURE — 80053 COMPREHEN METABOLIC PANEL: CPT | Performed by: INTERNAL MEDICINE

## 2018-12-12 PROCEDURE — 80061 LIPID PANEL: CPT | Performed by: INTERNAL MEDICINE

## 2018-12-12 PROCEDURE — 99214 OFFICE O/P EST MOD 30 MIN: CPT | Performed by: INTERNAL MEDICINE

## 2018-12-12 PROCEDURE — 36415 COLL VENOUS BLD VENIPUNCTURE: CPT | Performed by: INTERNAL MEDICINE

## 2018-12-12 RX ORDER — IRBESARTAN 75 MG/1
75 TABLET ORAL DAILY
Qty: 30 TABLET | Refills: 1 | Status: SHIPPED | OUTPATIENT
Start: 2018-12-12 | End: 2019-02-10

## 2018-12-12 ASSESSMENT — MIFFLIN-ST. JEOR: SCORE: 2104.23

## 2018-12-12 NOTE — NURSING NOTE
"/90   Pulse 107   Resp 15   Ht 1.778 m (5' 10\")   Wt 118.3 kg (260 lb 12.8 oz)   SpO2 98%   BMI 37.42 kg/m    Patient in for follow up HTN and lipids.  \Nasreen Venegas, CMA    "

## 2018-12-12 NOTE — LETTER
[facility]  [address], [address]  [place] [address]  [phone]        December 26, 2018    Frank RECINOS Al[place]augh  [address]  [facility] [id number]      Dear MrKathy Frank Flowers:      I have reviewed your tests and they are all normal except slightly high cholesterol and LDL, follow low fat diet and regular exercise, repeat lipids in 1 yr. If you have any further questions or problems, please contact our office.    Sincerely,    Artis Parikh M.D.  Results for orders placed or performed in visit on 12/12/18   Lipid panel reflex to direct LDL Fasting   Result Value Ref Range    Cholesterol 215 (H) <200 mg/dL    Triglycerides 140 <150 mg/dL    HDL Cholesterol 41 >39 mg/dL    LDL Cholesterol Calculated 146 (H) <100 mg/dL    Non HDL Cholesterol 174 (H) <130 mg/dL   Comprehensive metabolic panel   Result Value Ref Range    Sodium 138 133 - 144 mmol/L    Potassium 4.2 3.4 - 5.3 mmol/L    Chloride 102 94 - 109 mmol/L    Carbon Dioxide 26 20 - 32 mmol/L    Anion Gap 10 3 - 14 mmol/L    Glucose 93 70 - 99 mg/dL    Urea Nitrogen 18 7 - 30 mg/dL    Creatinine 1.19 0.66 - 1.25 mg/dL    GFR Estimate 68 >60 mL/min/1.7m2    GFR Estimate If Black 82 >60 mL/min/1.7m2    Calcium 9.2 8.5 - 10.1 mg/dL    Bilirubin Total 0.4 0.2 - 1.3 mg/dL    Albumin 3.9 3.4 - 5.0 g/dL    Protein Total 7.9 6.8 - 8.8 g/dL    Alkaline Phosphatase 78 40 - 150 U/L    ALT 50 0 - 70 U/L    AST 34 0 - 45 U/L

## 2018-12-12 NOTE — PROGRESS NOTES
SUBJECTIVE:   Frank Flowers is a 38 year old male who presents to clinic today for the following health issues:      Hyperlipidemia Follow-Up      Rate your low fat/cholesterol diet?: fair    Taking statin?  No    Other lipid medications/supplements?:  none    Hypertension Follow-up      Outpatient blood pressures are not being checked.  Patient was started on lisinopril hydrochlorothiazide at last office visit patient took it for 6 months and stopped taking the medication since 1 month as it was causing cough.     Low Salt Diet: low salt      Abnormal Liver enzyme; ALT of 90 at last OV. Pt drinks 4-5 alcoholic drinks 3 x week.       Pt was referred for sleep study at last office visit but patient could not make appointment and requesting referral again ..        Amount of exercise or physical activity: None    Problems taking medications regularly: Yes,  side effects, had cough and stopped BP med    Medication side effects: as above    Diet: low salt         Patient Active Problem List   Diagnosis     Essential hypertension     Mixed hyperlipidemia     Obesity (BMI 35.0-39.9) with comorbidity (H)     Past Surgical History:   Procedure Laterality Date     DENTAL SURGERY      abscessed tooth removed under anesthesia     OPEN REDUCTION INTERNAL FIXATION RODDING INTRAMEDULLARY TIBIA Left 1/14/2018    Procedure: OPEN REDUCTION INTERNAL FIXATION RODDING INTRAMEDULLARY TIBIA;  Intramedullary nailing, left tibial shaft fracture;  Surgeon: Jian Burgos MD;  Location:  OR       Social History     Tobacco Use     Smoking status: Current Every Day Smoker     Packs/day: 0.50     Types: Cigarettes     Smokeless tobacco: Never Used   Substance Use Topics     Alcohol use: Yes     Comment: Occas     Family History   Problem Relation Age of Onset     Depression Mother      Anxiety Disorder Mother      Unknown/Adopted Father      Unknown/Adopted Maternal Grandmother      Unknown/Adopted Maternal Grandfather       "Unknown/Adopted Paternal Grandmother      Unknown/Adopted Paternal Grandfather      Unknown/Adopted Brother      Anxiety Disorder Sister          Current Outpatient Medications   Medication Sig Dispense Refill     irbesartan (AVAPRO) 75 MG tablet Take 1 tablet (75 mg) by mouth daily 30 tablet 1     lisinopril-hydrochlorothiazide (PRINZIDE/ZESTORETIC) 20-25 MG per tablet Take 1 tablet by mouth daily (Patient not taking: Reported on 12/12/2018) 90 tablet 1       Reviewed and updated as needed this visit by clinical staff  Tobacco  Allergies  Meds  Med Hx  Surg Hx  Fam Hx  Soc Hx      Reviewed and updated as needed this visit by Provider         ROS:  CONSTITUTIONAL: NEGATIVE for fever, chills, change in weight  EYES: NEGATIVE for vision changes or irritation  ENT/MOUTH: NEGATIVE for ear, mouth and throat problems  RESP: NEGATIVE for significant cough or SOB  CV: NEGATIVE for chest pain, palpitations or peripheral edema  MUSCULOSKELETAL: NEGATIVE for significant arthralgias or myalgia  NEURO: NEGATIVE for weakness, dizziness or paresthesias    OBJECTIVE:                                                    /90   Pulse 107   Resp 15   Ht 1.778 m (5' 10\")   Wt 118.3 kg (260 lb 12.8 oz)   SpO2 98%   BMI 37.42 kg/m     Body mass index is 37.42 kg/m .   GENERAL: healthy, alert, well nourished, well hydrated, no distress  EYES: Eyes grossly normal to inspection, extraocular movements - intact, and PERRL  NECK: no tenderness, no adenopathy, no asymmetry, no masses, no stiffness;  RESP: lungs clear to auscultation - no rales, no rhonchi, no wheezes  CV: regular rates and rhythm,    MS: extremities- no gross deformities noted, no edema  NEURO: strength and tone- normal, sensory exam- grossly normal, mentation- intact, speech- normal, reflexes- symmetric       ASSESSMENT/PLAN:                                                        (I10) Essential hypertension  Plan: Elevated diastolic blood pressure, patient has " stopped taking his lisinopril hydrochlorothiazide since 1 month due to cough . will start irbesartan (AVAPRO) 75 MG tablet once daily as directed.explained clearly about the medication,insructions and side effects. The patient was advised to follow low-sodium diet regular exercise and follow-up in clinic in 4 weeks.            (E78.2) Mixed hyperlipidemia    Plan: Lipid panel reflex to direct LDL Fasting.pt was told I will contact pt after results and proceed accordingly.       (E66.01) Morbid obesity (H)  Plan: Discussed in detail about Diet,calorie intake,and importance of regular exercise,       (R94.5) Abnormal results of liver function studies  Plan: Patient was advised to cut down alcohol intake, avoid over-the-counter Tylenol and any herbal supplements. Will check  comprehensive metabolic panel .pt was told I will contact pt  after results and proceed accordingly.    Sleep study referral reprinted and given to pt            Yolanda Parikh MD  [facility]

## 2018-12-13 LAB
ALBUMIN SERPL-MCNC: 3.9 G/DL (ref 3.4–5)
ALP SERPL-CCNC: 78 U/L (ref 40–150)
ALT SERPL W P-5'-P-CCNC: 50 U/L (ref 0–70)
ANION GAP SERPL CALCULATED.3IONS-SCNC: 10 MMOL/L (ref 3–14)
AST SERPL W P-5'-P-CCNC: 34 U/L (ref 0–45)
BILIRUB SERPL-MCNC: 0.4 MG/DL (ref 0.2–1.3)
BUN SERPL-MCNC: 18 MG/DL (ref 7–30)
CALCIUM SERPL-MCNC: 9.2 MG/DL (ref 8.5–10.1)
CHLORIDE SERPL-SCNC: 102 MMOL/L (ref 94–109)
CHOLEST SERPL-MCNC: 215 MG/DL
CO2 SERPL-SCNC: 26 MMOL/L (ref 20–32)
CREAT SERPL-MCNC: 1.19 MG/DL (ref 0.66–1.25)
GFR SERPL CREATININE-BSD FRML MDRD: 68 ML/MIN/1.7M2
GLUCOSE SERPL-MCNC: 93 MG/DL (ref 70–99)
HDLC SERPL-MCNC: 41 MG/DL
LDLC SERPL CALC-MCNC: 146 MG/DL
NONHDLC SERPL-MCNC: 174 MG/DL
POTASSIUM SERPL-SCNC: 4.2 MMOL/L (ref 3.4–5.3)
PROT SERPL-MCNC: 7.9 G/DL (ref 6.8–8.8)
SODIUM SERPL-SCNC: 138 MMOL/L (ref 133–144)
TRIGL SERPL-MCNC: 140 MG/DL

## 2019-02-01 ENCOUNTER — OFFICE VISIT (OUTPATIENT)
Dept: SLEEP MEDICINE | Facility: CLINIC | Age: 39
End: 2019-02-01
Payer: COMMERCIAL

## 2019-02-01 VITALS
WEIGHT: 262 LBS | RESPIRATION RATE: 18 BRPM | DIASTOLIC BLOOD PRESSURE: 95 MMHG | BODY MASS INDEX: 37.51 KG/M2 | SYSTOLIC BLOOD PRESSURE: 134 MMHG | OXYGEN SATURATION: 97 % | HEIGHT: 70 IN | HEART RATE: 95 BPM

## 2019-02-01 DIAGNOSIS — I10 ESSENTIAL HYPERTENSION: ICD-10-CM

## 2019-02-01 DIAGNOSIS — G47.30 OBSERVED SLEEP APNEA: Primary | ICD-10-CM

## 2019-02-01 DIAGNOSIS — R06.83 SNORING: ICD-10-CM

## 2019-02-01 PROCEDURE — 99203 OFFICE O/P NEW LOW 30 MIN: CPT | Mod: GC | Performed by: PSYCHIATRY & NEUROLOGY

## 2019-02-01 ASSESSMENT — MIFFLIN-ST. JEOR: SCORE: 2109.67

## 2019-02-01 NOTE — NURSING NOTE
"Chief Complaint   Patient presents with     Consult     Snores per friends, wakes self up,  chokes and gasps for breath, Fatigued during the day.  Has trouble staying asleep. No SS or cpap       Initial BP (!) 134/95   Pulse 95   Resp 18   Ht 1.778 m (5' 10\")   Wt 118.8 kg (262 lb)   SpO2 97%   BMI 37.59 kg/m   Estimated body mass index is 37.59 kg/m  as calculated from the following:    Height as of this encounter: 1.778 m (5' 10\").    Weight as of this encounter: 118.8 kg (262 lb).    Medication Reconciliation: complete    Neck circumference: 20.5 inches / 52 centimeters.  Ess 18    Arlet Armstrong LPN  "

## 2019-02-01 NOTE — PROGRESS NOTES
"Sleep Consultation Note:    Date of this visit: 2/1/2019    Frank Flowers is sent by Yolanda Parikh for a sleep consultation regarding snoring.    Primary Physician: Yolanda Parikh     CC:  \"I wake up every one or two hours.\"    Frank Flowers is a 38 year old with PMH including HTN, HLD, and morbid obesity who was seen by his PCP in April where he noted symptoms of snoring and was referred for a sleep evaluation. Chart review shows a sleep study was ordered at [facility] in 2015, but it does not appear this occurred.     Frank reports he wakes up every one to two hours with a feeling he is choking or panting to catch his breath. Nightly snort arousals. A friend told him he snored and observed apneas when they shared a hotel room.   He has not had a previous sleep study.    He reports a history of depression and was treated with therapy. He is not currently receiving treatment, but does report intermittently low mood. He denies suicidal ideation or anhedonia. He reports a history of almost attempting suicide by jumping off a balcony at age 15, but that a friend talked him down. He otherwise has no history of suicide attempts and he has no history of hospitalization.     Sleep Disordered Breathing  Frank endorses snoring, snort arousals, choking/gasping for air, witnessed apneas, dry mouth, non-refreshing sleep, daytime sleepiness/fatigue.  He denies morning headaches.   Frank has lost 10 pounds in the last year.    Sleep Schedule/Sleep Concerns   He does endorse intermittent difficulty with falling asleep. Frank goes to bed at 10PM to 1AM, and it usually takes 15 minutes to fall asleep. About 1x/wk he is up for an hour not feeling sleepy, thinking about work. Sometimes with worries about not sleeping or depressed thoughts. He will watch TV in bed before falling asleep.     Frank does not endorse restlessness feelings in the legs. He has a dull pain from a metal jaylan in his left leg; broke " his leg slipping on ice 1/7/18.     Patient does use electronics in bed.   Patient does not have a regular bed partner.  Patient sleeps on his side.  Patient does not have any pets in the bedroom at night during sleep.  He does not use a sleep aid.  In the past, used to take melatonin.    He does endorse difficulty with staying asleep.  He wakes up 3-4 times throughout the night often from breathing difficulties.   After awakening, he is able to fall back asleep after 1 to 60 minutes.     He wakes up at 6-9 AM, with an alarm.    On non-work days, he falls asleep at 10-11PM (rarely 1AM) and gets up 9-11AM.  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    PHANI 24    Sleep Behaviors  He denies any cataplexy, sleep paralysis, sleep hallucinations.  He endorsed sleep talking in his past.  He denies sleep walking, sleep eating.  He does not report a history of acting out dreams.    Daytime Functioning  He naps 1-2 times per week for  minutes, feels refreshed after naps.  He does doze off during the day.      He admits feeling drowsy while driving. He denies moving violations, accidents or near misses from drowsy driving.     Patient's Reserve Sleepiness score 18/24.      Social History  Frank currently works as a .  Work schedule is as follows:  Variable shifts; often Thr-Sun; the shifts start from 7a-5p; 10a-8p; 3p-12:30a.    He decreased his caffeine intake since diagnosed with HTN. He no longer drinks caffeine in the morning, but will have soda as a mixed drink on several evens a week.   He drinks alcohol, 7-14drinks/week.  Rum and coke 1-2x/mo if out with a friend. Does not use alcohol as a sleep aid.   Patient is a smoker; 0.5-1 packs/day for 20 years.  Patient does not use illicit drugs.  No future surgeries planned.     Labs/Testing   Ref. Range 4/12/2018 09:42 12/12/2018 11:27   Carbon Dioxide Latest Ref Range: 20 - 32 mmol/L 28 26       Allergies:    No Known Allergies    Medications:    Current  Outpatient Medications   Medication Sig Dispense Refill     irbesartan (AVAPRO) 75 MG tablet Take 1 tablet (75 mg) by mouth daily 30 tablet 1       Problem List:  Patient Active Problem List    Diagnosis Date Noted     Obesity (BMI 35.0-39.9) with comorbidity (H) 12/12/2018     Priority: Medium     Mixed hyperlipidemia 04/25/2018     Priority: Medium     Essential hypertension 04/12/2018     Priority: Medium        Past Medical/Surgical History:  Past Medical History:   Diagnosis Date     Hypertension      Past Surgical History:   Procedure Laterality Date     DENTAL SURGERY      abscessed tooth removed under anesthesia     OPEN REDUCTION INTERNAL FIXATION RODDING INTRAMEDULLARY TIBIA Left 1/14/2018    Procedure: OPEN REDUCTION INTERNAL FIXATION RODDING INTRAMEDULLARY TIBIA;  Intramedullary nailing, left tibial shaft fracture;  Surgeon: Jian Burgos MD;  Location:  OR       Social History:  Social History     Socioeconomic History     Marital status: Single     Spouse name: Not on file     Number of children: Not on file     Years of education: Not on file     Highest education level: Not on file   Social Needs     Financial resource strain: Not on file     Food insecurity - worry: Not on file     Food insecurity - inability: Not on file     Transportation needs - medical: Not on file     Transportation needs - non-medical: Not on file   Occupational History     Not on file   Tobacco Use     Smoking status: Current Every Day Smoker     Packs/day: 0.50     Types: Cigarettes     Smokeless tobacco: Never Used   Substance and Sexual Activity     Alcohol use: Yes     Comment: Occas     Drug use: No     Sexual activity: Not Currently   Other Topics Concern     Parent/sibling w/ CABG, MI or angioplasty before 65F 55M? Not Asked   Social History Narrative     Not on file       Family History:  Family History   Problem Relation Age of Onset     Depression Mother      Anxiety Disorder Mother      Unknown/Adopted  "Father      Unknown/Adopted Maternal Grandmother      Unknown/Adopted Maternal Grandfather      Unknown/Adopted Paternal Grandmother      Unknown/Adopted Paternal Grandfather      Unknown/Adopted Brother      Anxiety Disorder Sister        Review of Systems:  He endorses:   postnasal drip, runny nose,   irregular heartbeat, feeling short of breath when lying down, chest pressure, high blood pressure,   numbness in his legs,   shortness of breath at rest and with activity, dry cough, productive cough, wheezing when breathing,   constipation,   dull left knee pain,   anxiety and depression   Otherwise a complete review of systems reviewed by me is negative with the exeption of what has been mentioned in the history of present illness.    Physical Examination:  BP (!) 134/95   Pulse 95   Resp 18   Ht 1.778 m (5' 10\")   Wt 118.8 kg (262 lb)   SpO2 97%   BMI 37.59 kg/m      Neck Cir (cm): 52 cm    Constitutional: . Awake, alert, cooperative, dressed casually, good eye contact, comfortably sitting in a chair, in no apparent distress  Mood: \"ok;\" affect anxious   Attention/Concentration:  Normal   Eyes: No icterus  ENT: Mallampati III   no tonsillar hypertrophy  no turbinate hypertrophy, no deviated septum, nasal airflow limited   Uvula and soft palate unremarkable  Cardiovascular: Regular rate and rhythm   Neck: supple, no thyroid enlargement.   Pulmonary:  Chest symmetric, lungs clear bilaterally and no crackles, wheezes or rales  Extremities:  No lower extremity edema.   Muscle/joint: Strength and tone normal   Skin:  No rash or significant lesions.   Gait: normal  Neurologic: alert, oriented x3, no focal neurological deficit, smile symmetric      Impression/Plan:    Snoring  Observed sleep apnea  Excessive daytime sleepiness  Shift worker    Frank Flowers is a 38 year old male with hypertension, hyperlipidemia, depression, and morbid obesity who presents to clinic for an evaluation for sleep apnea.  His STOP " BANG score is 7, indicating a high probability for sleep apnea.  He has risk factors for obesity hypoventilation as well as his recent laboratory testing shows some elevated bicarbonate levels and his BMI is 38.  Diagnosis and treatment for ROBB have been discussed. Complications of untreated ROBB have also been discussed. He was also advised about the detrimental impact of alcohol on sleep and sleep apnea. He stated a preference for home sleep testing.  -HST ordered (if insurance does not approve home testing would order an in lab split-night study with TCM)  -Plan for SAMINA after sleep apnea has been treated to assess for hypoventilation    He describes intermittent psychophysiological insomnia with intermittent depressive ruminations the delays the onset of his sleep occurring about once weekly.  His sleep schedule is also impacted by shift work as a .  We discussed sleep hygiene and stimulus control, given his history of watching television in the bedroom.  He was recommended to discuss normalizing his work schedule if possible with his employer.  Normalizing his sleep schedule is important particularly with his excessive daytime sleepiness and likely untreated sleep apnea.      Patient is a smoker and has been encouraged to not smoke.    Encourage weight loss, healthy diet, and exercise.    Patient was strongly advised to avoid driving, operating any heavy machinery or other hazardous situations while drowsy or sleepy.  Patient was counseled on the importance of driving while alert, to pull over if drowsy, or nap before getting into the vehicle if sleepy.        He will follow up approximately two weeks after his sleep study has been competed to review the results and discuss plan of care.        CC: Yolanda Parikh      Seen and examined with Dr. Oscar Angulo DO  Sleep Medicine Fellow    Supervising Physician Consultation Addendum Note  Frank Flowers was seen and  examined in the Sleep Medicine Clinic with the resident.  The assessment and plan were made jointly.  38-year-old man with rather profound daytime sleepiness in the setting of probable moderate to severe obstructive sleep apnea as well as insufficient sleep related to shift work.  We have counseled the patient on a high probability of sleep apnea potential treatments including initiation of CPAP for more immediate and effective control of his sleepiness with long-term counseling regarding shift work scheduling and reassessment of sleepiness with more optimal management of sleep apnea and shift work.  TESSIE ARGUELLES MD

## 2019-02-01 NOTE — PATIENT INSTRUCTIONS
"MY TREATMENT INFORMATION FOR SLEEP APNEA-  Frank Flowers    DOCTOR : Ulisses Angulo  SLEEP CENTER :      MY CONTACT NUMBER:     Please review information on some options to stop smoking: https://www.lung.org/stop-smoking/n-ymcc-ma-quit/how-to-quit-smoking.html    Am I having a sleep study at a sleep center?  Make sure you have an appointment for the study before you leave!    Am I having a home sleep study?  Watch this video:  https://www.[employer].com/watch?v=CteI_GhyP9g&list=[email or web address]  Please verify your insurance coverage with your insurance carrier    Frequently asked questions:  1. What is Obstructive Sleep Apnea (ROBB)? ROBB is the most common type of sleep apnea. Apnea means, \"without breath.\"  Apnea is most often caused by narrowing or collapse of the upper airway as muscles relax during sleep.   Almost everyone has occasional apneas. Most people with sleep apnea have had brief interruptions at night frequently for many years.  The severity of sleep apnea is related to how frequent and severe the events are.   2. What are the consequences of ROBB? Symptoms include: feeling sleepy during the day, snoring loudly, gasping or stopping of breathing, trouble sleeping, and occasionally morning headaches or heartburn at night.  Sleepiness can be serious and even increase the risk of falling asleep while driving. Other health consequences may include development of high blood pressure and other cardiovascular disease in persons who are susceptible. Untreated ROBB  can contribute to heart disease, stroke and diabetes.   3. What are the treatment options? In most situations, sleep apnea is a lifelong disease that must be managed with daily therapy. Medications are not effective for sleep apnea and surgery is generally not considered until other therapies have been tried. Your treatment is your choice . Continuous Positive Airway (CPAP) works right away and is the therapy that is effective in " nearly everyone. An oral device to hold your jaw forward is usually the next most reliable option. Other options include postioning devices (to keep you off your back), weight loss, and surgery including a tongue pacing device. There is more detail about some of these options below.    Important tips for using CPAP and similar devices   Know your equipment:  CPAP is continuous positive airway pressure that prevents obstructive sleep apnea by keeping the throat from collapsing while you are sleeping. In most cases, the device is  smart  and can slowly self-adjusts if your throat collapses and keeps a record every day of how well you are treated-this information is available to you and your care team.  BPAP is bilevel positive airway pressure that keeps your throat open and also assists each breath with a pressure boost to maintain adequate breathing.  Special kinds of BPAP are used in patients who have inadequate breathing from lung or heart disease. In most cases, the device is  smart  and can slowly self-adjusts to assist breathing. Like CPAP, the device keeps a record of how well you are treated.  Your mask is your connection to the device. You get to choose what feels most comfortable and the staff will help to make sure if fits. Here: are some examples of the different masks that are available:       Key points to remember on your journey with sleep apnea:  1. Sleep study.  PAP devices often need to be adjusted during a sleep study to show that they are effective and adjusted right.  2. Good tips to remember: Try wearing just the mask during a quiet time during the day so your body adapts to wearing it. A humidifier is recommended for comfort in most cases to prevent drying of your nose and throat. Allergy medication from your provider may help you if you are having nasal congestion.  3. Getting settled-in. It takes more than one night for most of us to get used to wearing a mask. Try wearing just the mask  during a quiet time during the day so your body adapts to wearing it. A humidifier is recommended for comfort in most cases. Our team will work with you carefully on the first day and will be in contact within 4 days and again at 2 and 4 weeks for advice and remote device adjustments. Your therapy is evaluated by the device each day.   4. Use it every night. The more you are able to sleep naturally for 7-8 hours, the more likely you will have good sleep and to prevent health risks or symptoms from sleep apnea. Even if you use it 4 hours it helps. Occasionally all of us are unable to use a medical therapy, in sleep apnea, it is not dangerous to miss one night.   5. Communicate. Call our skilled team on the number provided on the first day if your visit for problems that make it difficult to wear the device. Over 2 out of 3 patients can learn to wear the device long-term with help from our team. Remember to call our team or your sleep providers if you are unable to wear the device as we may have other solutions for those who cannot adapt to mask CPAP therapy. It is recommended that you sleep your sleep provider within the first 3 months and yearly after that if you are not having problems.   Take care of your equipment. Make sure you clean your mask and tubing using directions every day and that your filter and mask are replaced as recommended or if they are not working.     BESIDES CPAP, WHAT OTHER THERAPIES ARE THERE?    Positioning Device  Positioning devices are generally used when sleep apnea is mild and only occurs on your back.This example shows a pillow that straps around the waist. It may be appropriate for those whose sleep study shows milder sleep apnea that occurs primarily when lying flat on one's back. Preliminary studies have shown benefit but effectiveness at home may need to be verified by a home sleep test. These devices are generally not covered by medical insurance.  Examples of devices that  maintain sleeping on the back to prevent snoring and mild sleep apnea.    Belt type body positioner  Http://[employer].PEVESA/    Electronic reminder  Http://nightshifttherapy.com/  Http://www.[employer].PEVESA.au/    Oral Appliance  What is oral appliance therapy?  An oral appliance device fits on your teeth at night like a retainer used after having braces. The device is made by a specialized dentist and requires several visits over 1-2 months before a manufactured device is made to fit your teeth and is adjusted to prevent your sleep apnea. Once an oral device is working properly, snoring should be improved. A home sleep test may be recommended at that time if to determine whether the sleep apnea is adequately treated.       Some things to remember:  -Oral devices are often, but not always, covered by your medical insurance. Be sure to check with your insurance provider.   -If you are referred for oral therapy, you will be given a list of specialized dentists to consider or you may choose to visit the Web site of the American Academy of Dental Sleep Medicine  -Oral devices are less likely to work if you have severe sleep apnea or are extremely overweight.     More detailed information  An oral appliance is a small acrylic device that fits over the upper and lower teeth  (similar to a retainer or a mouth guard). This device slightly moves jaw forward, which moves the base of the tongue forward, opens the airway, improves breathing for effective treat snoring and obstructive sleep apnea in perhaps 7 out of 10 people .  The best working devices are custom-made by a dental device  after a mold is made of the teeth 1, 2, 3.  When is an oral appliance indicated?  Oral appliance therapy is recommended as a first-line treatment for patients with primary snoring, mild sleep apnea, and for patients with moderate sleep apnea who prefer appliance therapy to use of CPAP4, 5. Severity of sleep apnea is determined by sleep  testing and is based on the number of respiratory events per hour of sleep.   How successful is oral appliance therapy?  The success rate of oral appliance therapy in patients with mild sleep apnea is 75-80% while in patients with moderate sleep apnea it is 50-70%. The chance of success in patients with severe sleep apnea is 40-50%. The research also shows that oral appliances have a beneficial effect on the cardiovascular health of ROBB patients at the same magnitude as CPAP therapy7.  Oral appliances should be a second-line treatment in cases of severe sleep apnea, but if not completely successful then a combination therapy utilizing CPAP plus oral appliance therapy may be effective. Oral appliances tend to be effective in a broad range of patients although studies show that the patients who have the highest success are females, younger patients, those with milder disease, and less severe obesity. 3, 6.   Finding a dentist that practices dental sleep medicine  Specific training is available through the American Academy of Dental Sleep Medicine for dentists interested in working in the field of sleep. To find a dentist who is educated in the field of sleep and the use of oral appliances, near you, visit the Web site of the American Academy of Dental Sleep Medicine.    References  1. Marissa, et al. Objectively measured vs self-reported compliance during oral appliance therapy for sleep-disordered breathing. Chest 2013; 144(5): 2392-0760.  2. Peg et al. Objective measurement of compliance during oral appliance therapy for sleep-disordered breathing. Thorax 2013; 68(1): 91-96.  3. Rafaela, et al. Mandibular advancement devices in 620 men and women with ROBB and snoring: tolerability and predictors of treatment success. Chest 2004; 125: 0212-9040.  4. Iglesia et al. Oral appliances for snoring and ROBB: a review. Sleep 2006; 29: 244-262.  5. Harper et al. Oral appliance treatment for ROBB: an update. J  Clin Sleep Med 2014; 10(2): 215-227.  6. Sharon et al. Predictors of OSAH treatment outcome. J Dent Res 2007; 86: 5792-0680.      Weight Loss:    Weight loss is a long-term strategy that may improve sleep apnea in some patients.    Weight management is a personal decision and the decision should be based on your interest and the potential benefits.  If you are interested in exploring weight loss strategies, the following discussion covers the impact on weight loss on sleep apnea and the approaches that may be successful.    Being overweight does not necessarily mean you will have health consequences.  Those who have BMI over 35 or over 27 with existing medical conditions carries greater risk.   Weight loss decreases severity of sleep apnea in most people with obesity. For those with mild obesity who have developed snoring with weight gain, even 15-30 pound weight loss can improve and occasionally eliminate sleep apnea.  Structured and life-long dietary and health habits are necessary to lose weight and keep healthier weight levels.     Though there may be significant health benefits from weight loss, long-term weight loss is very difficult to achieve- studies show success with dietary management in less than 10% of people. In addition, substantial weight loss may require years of dietary control and may be difficult if patients have severe obesity. In these cases, surgical management may be considered.  Finally, older individuals who have tolerated obesity without health complications may be less likely to benefit from weight loss strategies.        Surgery:    Surgery for obstructive sleep apnea is considered generally only when other therapies fail to work. Surgery may be discussed with you if you are having a difficult time tolerating CPAP and or when there is an abnormal structure that requires surgical correction.  Nose and throat surgeries often enlarge the airway to prevent collapse.  Most of these  surgeries create pain for 1-2 weeks and up to half of the most common surgeries are not effective throughout life.  You should carefully discuss the benefits and drawbacks to surgery with your sleep provider and surgeon to determine if it is the best solution for you.   More information  Surgery for ROBB is directed at areas that are responsible for narrowing or complete obstruction of the airway during sleep.  There are a wide range of procedures available to enlarge and/or stabilize the airway to prevent blockage of breathing in the three major areas where it can occur: the palate, tongue, and nasal regions.  Successful surgical treatment depends on the accurate identification of the factors responsible for obstructive sleep apnea in each person.  A personalized approach is required because there is no single treatment that works well for everyone.  Because of anatomic variation, consultation with an examination by a sleep surgeon is a critical first step in determining what surgical options are best for each patient.  In some cases, examination during sedation may be recommended in order to guide the selection of procedures.  Patients will be counseled about risks and benefits as well as the typical recovery course after surgery. Surgery is typically not a cure for a person s ROBB.  However, surgery will often significantly improve one s ROBB severity (termed  success rate ).  Even in the absence of a cure, surgery will decrease the cardiovascular risk associated with OSA7; improve overall quality of life8 (sleepiness, functionality, sleep quality, etc).      Palate Procedures:  Patients with ROBB often have narrowing of their airway in the region of their tonsils and uvula.  The goals of palate procedures are to widen the airway in this region as well as to help the tissues resist collapse.  Modern palate procedure techniques focus on tissue conservation and soft tissue rearrangement, rather than tissue removal.   Often the uvula is preserved in this procedure. Residual sleep apnea is common in patient after pharyngoplasty with an average reduction in sleep apnea events of 33%2.      Tongue Procedures:  ExamWhile patients are awake, the muscles that surround the throat are active and keep this region open for breathing. These muscles relax during sleep, allowing the tongue and other structures to collapse and block breathing.  There are several different tongue procedures available.  Selection of a tongue base procedure depends on characteristics seen on physical exam.  Generally, procedures are aimed at removing bulky tissues in this area or preventing the back of the tongue from falling back during sleep.  Success rates for tongue surgery range from 50-62%3.    Hypoglossal Nerve Stimulation:  Hypoglossal nerve stimulation has recently received approval from the [place] Food and Drug Administration for the treatment of obstructive sleep apnea.  This is based on research showing that the system was safe and effective in treating sleep apnea6.  Results showed that the median AHI score decreased 68%, from 29.3 to 9.0. This therapy uses an implant system that senses breathing patterns and delivers mild stimulation to airway muscles, which keeps the airway open during sleep.  The system consists of three fully implanted components: a small generator (similar in size to a pacemaker), a breathing sensor, and a stimulation lead.  Using a small handheld remote, a patient turns the therapy on before bed and off upon awakening.    Candidates for this device must be greater than 22 years of age, have moderate to severe ROBB (AHI between 20-65), BMI less than 32, have tried CPAP/oral appliance without success, and have appropriate upper airway anatomy (determined by a sleep endoscopy performed by Dr. Saha).    Hypoglossal Nerve Stimulation Pathway:    The sleep surgeon s office will work with the patient through the insurance  prior-authorization process (including communications and appeals).    Nasal Procedures:  Nasal obstruction can interfere with nasal breathing during the day and night.  Studies have shown that relief of nasal obstruction can improve the ability of some patients to tolerate positive airway pressure therapy for obstructive sleep apnea1.  Treatment options include medications such as nasal saline, topical corticosteroid and antihistamine sprays, and oral medications such as antihistamines or decongestants. Non-surgical treatments can include external nasal dilators for selected patients. If these are not successful by themselves, surgery can improve the nasal airway either alone or in combination with these other options.      Combination Procedures:  Combination of surgical procedures and other treatments may be recommended, particularly if patients have more than one area of narrowing or persistent positional disease.  The success rate of combination surgery ranges from 66-80%2,3.    References  1. Amanda YBARRA. The Role of the Nose in Snoring and Obstructive Sleep Apnoea: An Update.  Eur Arch Otorhinolaryngol. 2011; 268: 1365-73.  2.  Kady SM; Jerrell JA; Thanh JR; Pallanch JF; Jennifer MB; Ferdinand SG; Lupe WHITE. Surgical modifications of the upper airway for obstructive sleep apnea in adults: a systematic review and meta-analysis. SLEEP 2010;33(10):4322-5739. Edi KUMAR. Hypopharyngeal surgery in obstructive sleep apnea: an evidence-based medicine review.  Arch Otolaryngol Head Neck Surg. 2006 Feb;132(2):206-13.  3. Clint YH1, Kenan Y, Jung SARKIS. The efficacy of anatomically based multilevel surgery for obstructive sleep apnea. Otolaryngol Head Neck Surg. 2003 Oct;129(4):327-35.  4. Kezirian E, Goldberg A. Hypopharyngeal Surgery in Obstructive Sleep Apnea: An Evidence-Based Medicine Review. Arch Otolaryngol Head Neck Surg. 2006 Feb;132(2):206-13.  5. Mike LYNN et al. Upper-Airway Stimulation for Obstructive Sleep  Apnea.  N Engl J Med. 2014 Jan 9;370(2):139-49.  6. Marcus Y et al. Increased Incidence of Cardiovascular Disease in Middle-aged Men with Obstructive Sleep Apnea. Am J Respir Crit Care Med; 2002 166: 159-165  7. Carlitos KEITH et al. Studying Life Effects and Effectiveness of Palatopharyngoplasty (SLEEP) study: Subjective Outcomes of Isolated Uvulopalatopharyngoplasty. Otolaryngol Head Neck Surg. 2011; 144: 623-631.          Your BMI is Body mass index is 37.59 kg/m .  Weight management is a personal decision.  If you are interested in exploring weight loss strategies, the following discussion covers the approaches that may be successful. Body mass index (BMI) is one way to tell whether you are at a healthy weight, overweight, or obese. It measures your weight in relation to your height.  A BMI of 18.5 to 24.9 is in the healthy range. A person with a BMI of 25 to 29.9 is considered overweight, and someone with a BMI of 30 or greater is considered obese. More than two-thirds of American adults are considered overweight or obese.  Being overweight or obese increases the risk for further weight gain. Excess weight may lead to heart disease and diabetes.  Creating and following plans for healthy eating and physical activity may help you improve your health.  Weight control is part of healthy lifestyle and includes exercise, emotional health, and healthy eating habits. Careful eating habits lifelong are the mainstay of weight control. Though there are significant health benefits from weight loss, long-term weight loss with diet alone may be very difficult to achieve- studies show long-term success with dietary management in less than 10% of people. Attaining a healthy weight may be especially difficult to achieve in those with severe obesity. In some cases, medications, devices and surgical management might be considered.  What can you do?  If you are overweight or obese and are interested in methods for weight loss, you  should discuss this with your provider.     Consider reducing daily calorie intake by 500 calories.     Keep a food journal.     Avoiding skipping meals, consider cutting portions instead.    Diet combined with exercise helps maintain muscle while optimizing fat loss. Strength training is particularly important for building and maintaining muscle mass. Exercise helps reduce stress, increase energy, and improves fitness. Increasing exercise without diet control, however, may not burn enough calories to loose weight.       Start walking three days a week 10-20 minutes at a time    Work towards walking thirty minutes five days a week     Eventually, increase the speed of your walking for 1-2 minutes at time    In addition, we recommend that you review healthy lifestyles and methods for weight loss available through the National Institutes of Health patient information sites:  http://win.niddk.nih.gov/publications/index.htm    And look into health and wellness programs that may be available through your health insurance provider, employer, local community center, or [employer].    Weight management plan: Patient was referred to their PCP to discuss a diet and exercise plan.     Your blood pressure was checked while you were in clinic today.  Please read the guidelines below about what these numbers mean and what you should do about them.  Your systolic blood pressure is the top number.  This is the pressure when the heart is pumping.  Your diastolic blood pressure is the bottom number.  This is the pressure in between beats.  If your systolic blood pressure is less than 120 and your diastolic blood pressure is less than 80, then your blood pressure is normal. There is nothing more that you need to do about it  If your systolic blood pressure is 120-139 or your diastolic blood pressure is 80-89, your blood pressure may be higher than it should be.  You should have your blood pressure re-checked within a year by a primary  care provider.  If your systolic blood pressure is 140 or greater or your diastolic blood pressure is 90 or greater, you may have high blood pressure.  High blood pressure is treatable, but if left untreated over time it can put you at risk for heart attack, stroke, or kidney failure.  You should have your blood pressure re-checked by a primary care provider within the next four weeks.

## 2019-02-10 ENCOUNTER — MYC REFILL (OUTPATIENT)
Dept: INTERNAL MEDICINE | Facility: CLINIC | Age: 39
End: 2019-02-10

## 2019-02-10 DIAGNOSIS — I10 ESSENTIAL HYPERTENSION: ICD-10-CM

## 2019-02-11 ENCOUNTER — MYC REFILL (OUTPATIENT)
Dept: INTERNAL MEDICINE | Facility: CLINIC | Age: 39
End: 2019-02-11

## 2019-02-11 DIAGNOSIS — I10 ESSENTIAL HYPERTENSION: ICD-10-CM

## 2019-02-11 RX ORDER — IRBESARTAN 75 MG/1
75 TABLET ORAL DAILY
Qty: 30 TABLET | Refills: 1 | Status: CANCELLED | OUTPATIENT
Start: 2019-02-11

## 2019-02-11 RX ORDER — IRBESARTAN 75 MG/1
75 TABLET ORAL DAILY
Qty: 30 TABLET | Refills: 0 | Status: SHIPPED | OUTPATIENT
Start: 2019-02-11 | End: 2019-03-01 | Stop reason: DRUGHIGH

## 2019-02-11 NOTE — TELEPHONE ENCOUNTER
"Requested Prescriptions   Pending Prescriptions Disp Refills     irbesartan (AVAPRO) 75 MG tablet 30 tablet 1     Sig: Take 1 tablet (75 mg) by mouth daily    Angiotensin-II Receptors Failed - 2/10/2019 11:39 AM       Failed - Blood pressure under 140/90 in past 12 months    BP Readings from Last 3 Encounters:   02/01/19 (!) 134/95   12/12/18 (!) 124/98   04/25/18 136/86                Passed - Recent (12 mo) or future (30 days) visit within the authorizing provider's specialty    Patient had office visit in the last 12 months or has a visit in the next 30 days with authorizing provider or within the authorizing provider's specialty.  See \"Patient Info\" tab in inbasket, or \"Choose Columns\" in Meds & Orders section of the refill encounter.             Passed - Medication is active on med list       Passed - Patient is age 18 or older       Passed - Normal serum creatinine on file in past 12 months    Recent Labs   Lab Test 12/12/18  1127   CR 1.19            Passed - Normal serum potassium on file in past 12 months    Recent Labs   Lab Test 12/12/18  1127   POTASSIUM 4.2                    Routing refill request to provider for review/approval because:  BP readings out of range.  HERNÁN Corrales R.N.          "

## 2019-02-12 ENCOUNTER — OFFICE VISIT (OUTPATIENT)
Dept: SLEEP MEDICINE | Facility: CLINIC | Age: 39
End: 2019-02-12
Payer: COMMERCIAL

## 2019-02-12 DIAGNOSIS — I10 ESSENTIAL HYPERTENSION: ICD-10-CM

## 2019-02-12 DIAGNOSIS — R06.83 SNORING: ICD-10-CM

## 2019-02-12 DIAGNOSIS — G47.30 OBSERVED SLEEP APNEA: ICD-10-CM

## 2019-02-12 NOTE — TELEPHONE ENCOUNTER
Medication refilled 2/11/19 per primary care provider with note:  Diastolic blood pressure elevated at last office visit.  Please advise appointment.    [employer] message sent.

## 2019-02-13 ENCOUNTER — DOCUMENTATION ONLY (OUTPATIENT)
Dept: SLEEP MEDICINE | Facility: CLINIC | Age: 39
End: 2019-02-13
Payer: COMMERCIAL

## 2019-02-13 PROCEDURE — G0399 HOME SLEEP TEST/TYPE 3 PORTA: HCPCS | Performed by: INTERNAL MEDICINE

## 2019-02-14 NOTE — PROCEDURES
"HOME SLEEP STUDY INTERPRETATION    Patient: Frank Flowers  MRN: [id number]  YOB: 1980  Study Date: 2019  Referring Provider: Yolanda Parikh;   Ordering Provider: TESSIE ARGUELLES MD     Indications for Home Study: Frank Flowers is a 38 year old male with a history of hypertension, lipidemia, severe obesity who presents with symptoms suggestive of obstructive sleep apnea with extremely loud snoring, interrupted breathing with apneas and profound daytime sleepiness.    Estimated body mass index is 37.59 kg/m  as calculated from the following:    Height as of 19: 1.778 m (5' 10\").    Weight as of 19: 118.8 kg (262 lb).  Total score - [place]: 18 (2019  9:00 AM)  STOP-BAN/8    Data: A full night home sleep study was performed recording the standard physiologic parameters including body position, movement, sound, nasal pressure, thermal oral airflow, chest and abdominal movements with respiratory inductance plethysmography, and oxygen saturation by pulse oximetry. Pulse rate was estimated by oximetry recording. This study was considered adequate based on > 4 hours of quality oximetry and respiratory recording. As specified by the AASM Manual for the Scoring of Sleep and Associated events, version 2.3, Rule VIII.D 1B, 4% oxygen desaturation scoring for hypopneas is used as a standard of care on all home sleep apnea testing.    Analysis Time:  452 minutes    Respiration:   Sleep Associated Hypoxemia: sustained hypoxemia was present. Baseline oxygen saturation was 93%.  Time with saturation less than or equal to 88% was 6.3 minutes. The lowest oxygen saturation was 85%.   Snoring: Snoring was present up to  db.  Respiratory events: The home study revealed a presence of 108 obstructive apneas and 4 mixed and central apneas. There were 394 hypopneas resulting in a combined apnea/hypopnea index [AHI] of 58.4 events per hour.  AHI was 96.6 per hour supine, - per hour prone, " 46.3 per hour on left side, and 65.6 per hour on right side.   Pattern: Excluding events noted above, respiratory rate and pattern was Normal.    Position: Percent of time spent: supine - 13%, prone - 0%, on left - 58%, on right - 29%.    Heart Rate: By pulse oximetry normal rate was noted.     Assessment:   Severe obstructive sleep apnea with hypoxemia.  Hypoventilation is not excluded.      Recommendations:  Consider auto-CPAP at 5-15 cmH2O as this therapy has a high probability of immediate effectiveness and correction sleep apnea  Recommend follow-up to ensure long-term adherence and effectiveness.  The use of oral devices or surgical management in the setting of severe obesity and severe sleep apnea less likely to be effective.  Patient contacted by telephone on 2/14/19 to offer CPAP initiation but he was not available.  Suggest optimizing sleep hygiene and avoiding sleep deprivation.  Weight management.    Diagnosis Code(s): Obstructive Sleep Apnea G47.33, Hypoxemia G47.36    TESSIE ARGUELLES MD, February 14, 2019   Diplomate, American Board of Internal Medicine, Sleep Medicine

## 2019-02-14 NOTE — PROGRESS NOTES
This HSAT was performed using a Noxturnal T3 device which recorded snore, sound, movement activity, body position, nasal pressure, oronasal thermal airflow, pulse, oximetry and both chest and abdominal respiratory effort. HSAT data was restricted to the time patient states they were in bed.     HSAT was scored using 1B 4% hypopnea rule.     AHI: 58.4. Snoring was reported as loud.  Time with SpO2 below 89% was 6.3 minutes.   Overall signal quality was good     Pt will follow up with sleep provider to determine appropriate therapy.     Ordering MD, Adele, Ulisses Marie DO, Edilson Moore MD Charles Oyugi BA, [facility], [facility] System Clinical Specialist 02/14/2019

## 2019-02-20 ENCOUNTER — MYC MEDICAL ADVICE (OUTPATIENT)
Dept: SLEEP MEDICINE | Facility: CLINIC | Age: 39
End: 2019-02-20

## 2019-02-20 DIAGNOSIS — G47.33 OSA (OBSTRUCTIVE SLEEP APNEA): Primary | ICD-10-CM

## 2019-02-27 PROBLEM — G47.33 OSA (OBSTRUCTIVE SLEEP APNEA): Status: ACTIVE | Noted: 2019-02-27

## 2019-03-01 ENCOUNTER — TELEPHONE (OUTPATIENT)
Dept: SLEEP MEDICINE | Facility: CLINIC | Age: 39
End: 2019-03-01

## 2019-03-01 ENCOUNTER — OFFICE VISIT (OUTPATIENT)
Dept: INTERNAL MEDICINE | Facility: CLINIC | Age: 39
End: 2019-03-01
Payer: COMMERCIAL

## 2019-03-01 VITALS
SYSTOLIC BLOOD PRESSURE: 134 MMHG | DIASTOLIC BLOOD PRESSURE: 98 MMHG | WEIGHT: 265.9 LBS | HEART RATE: 101 BPM | HEIGHT: 70 IN | TEMPERATURE: 98.4 F | BODY MASS INDEX: 38.07 KG/M2 | OXYGEN SATURATION: 99 % | RESPIRATION RATE: 17 BRPM

## 2019-03-01 DIAGNOSIS — G47.33 OBSTRUCTIVE SLEEP APNEA (ADULT) (PEDIATRIC): Primary | ICD-10-CM

## 2019-03-01 DIAGNOSIS — E66.01 MORBID OBESITY (H): ICD-10-CM

## 2019-03-01 DIAGNOSIS — I10 ESSENTIAL HYPERTENSION: ICD-10-CM

## 2019-03-01 DIAGNOSIS — G47.33 SEVERE OBSTRUCTIVE SLEEP APNEA: Primary | ICD-10-CM

## 2019-03-01 PROCEDURE — 99214 OFFICE O/P EST MOD 30 MIN: CPT | Performed by: INTERNAL MEDICINE

## 2019-03-01 RX ORDER — IRBESARTAN 150 MG/1
150 TABLET ORAL AT BEDTIME
Qty: 30 TABLET | Refills: 1 | Status: SHIPPED | OUTPATIENT
Start: 2019-03-01 | End: 2019-05-15

## 2019-03-01 RX ORDER — IRBESARTAN 75 MG/1
75 TABLET ORAL DAILY
Qty: 30 TABLET | Refills: 0 | Status: CANCELLED | OUTPATIENT
Start: 2019-03-01

## 2019-03-01 ASSESSMENT — MIFFLIN-ST. JEOR: SCORE: 2127.36

## 2019-03-01 NOTE — NURSING NOTE
"/90   Pulse 101   Resp 17   Ht 1.778 m (5' 10\")   Wt 120.6 kg (265 lb 14.4 oz)   SpO2 99%   BMI 38.15 kg/m    Patient in for recheck on BP cuco Venegas CMA    "

## 2019-03-01 NOTE — TELEPHONE ENCOUNTER
Called patient to let him know we received his RX for a STAT PAP Setup and to let him know we have submitted a request to his insurance for a prior authorization. Left voicemail for patient to call [facility] at [phone].

## 2019-03-01 NOTE — TELEPHONE ENCOUNTER
Pt reviewed study results via [place] and elected to pursue PAP therapy.   STAT order for aCPAP 5-15 cm H2O placed.   Plan for follow up in sleep clinic in 6-8 weeks with data download.     Ulisses Angulo, DO  Sleep Medicine Fellow

## 2019-03-01 NOTE — PROGRESS NOTES
SUBJECTIVE:   Frank Flowers is a 38 year old male who presents to clinic today for the following health issues:     Hypertension Follow-up      Outpatient blood pressures are not being checked.  Patient was started on Avapro 75 mg 1 tablet once daily at last office visit as he had cough with lisinopril hydrochlorothiazide patient states that the cough has resolved after stopping lisinopril .  Currently on Avapro and tolerating well.    Low Salt Diet: low salt    Patient was also recently diagnosed with severe obstructive sleep apnea and has not started the CPAP yet, follows up with sleep clinic    Morbid obesity; trying diet and exercise.      Patient Active Problem List   Diagnosis     Essential hypertension     Mixed hyperlipidemia     Obesity (BMI 35.0-39.9) with comorbidity (H)     Smoker     ROBB (obstructive sleep apnea), severe     Past Surgical History:   Procedure Laterality Date     DENTAL SURGERY      abscessed tooth removed under anesthesia     OPEN REDUCTION INTERNAL FIXATION RODDING INTRAMEDULLARY TIBIA Left 1/14/2018    Procedure: OPEN REDUCTION INTERNAL FIXATION RODDING INTRAMEDULLARY TIBIA;  Intramedullary nailing, left tibial shaft fracture;  Surgeon: Jian Burgos MD;  Location:  OR       Social History     Tobacco Use     Smoking status: Current Every Day Smoker     Packs/day: 0.50     Types: Cigarettes     Smokeless tobacco: Never Used   Substance Use Topics     Alcohol use: Yes     Comment: Occas     Family History   Problem Relation Age of Onset     Depression Mother      Anxiety Disorder Mother      Unknown/Adopted Father      Unknown/Adopted Maternal Grandmother      Unknown/Adopted Maternal Grandfather      Unknown/Adopted Paternal Grandmother      Unknown/Adopted Paternal Grandfather      Unknown/Adopted Brother      Anxiety Disorder Sister      Sleep Apnea Sister          Current Outpatient Medications   Medication Sig Dispense Refill     irbesartan (AVAPRO) 150 MG tablet  "Take 1 tablet (150 mg) by mouth At Bedtime 30 tablet 1       Reviewed and updated as needed this visit by clinical staff  Tobacco  Allergies  Meds  Med Hx  Surg Hx  Fam Hx  Soc Hx      Reviewed and updated as needed this visit by Provider         ROS:  CONSTITUTIONAL: NEGATIVE for fever, chills, change in weight  EYES: NEGATIVE for vision changes or irritation  RESP: NEGATIVE for significant cough or SOB  CV: NEGATIVE for chest pain, palpitations or peripheral edema  MUSCULOSKELETAL: NEGATIVE for significant arthralgias or myalgia  NEURO: NEGATIVE for weakness, dizziness or paresthesias    OBJECTIVE:                                                    BP (!) 134/98   Pulse 101   Temp 98.4  F (36.9  C) (Oral)   Resp 17   Ht 1.778 m (5' 10\")   Wt 120.6 kg (265 lb 14.4 oz)   SpO2 99%   BMI 38.15 kg/m    Body mass index is 38.15 kg/m .   GENERAL: healthy, alert, well nourished, well hydrated, no distress  EYES: Eyes grossly normal to inspection, extraocular movements - intact, and PERRL  RESP: lungs clear to auscultation - no rales, no rhonchi, no wheezes  CV: regular rates and rhythm, normal S1 S2,  -  MS: extremities- no gross deformities noted, no edema, no calf tenderness        ASSESSMENT/PLAN:                                                      (I10) Essential hypertension  Comment: Blood pressure still elevated  Plan: Will increase irbesartan (AVAPRO) to 150 MG tablet once daily as directed.Advised to follow low salt diet and exercise and f/u in 4 wks.     (E66.01) Morbid obesity (H)  Plan:  Discussed in detail about Diet,calorie intake,and importance of regular exercise,        (G47.33) Severe obstructive sleep apnea  (primary encounter diagnosis)  Plan: Has not started CPAP treatment but making appointment to see the sleep clinic to discuss.       Yolanda Parikh MD  [facility]    "

## 2019-03-04 NOTE — TELEPHONE ENCOUNTER
Patient returned my phone call from 03/01/19. I let patient know that I was calling him to let him know that we received his RX for a STAT CPAP setup, but we did not to obtain a Prior Auth from him insurance. I let him know that I would call him to schedule as soon as we receive the approval. Patient had no other questions.

## 2019-03-04 NOTE — TELEPHONE ENCOUNTER
Pt sent my chart message about needing an appointment.    Pt called and informed that he will need to contact [facility] in [place] to schedule cpap set up.  PT is ok with this plan and will call [facility] to schedule set up. Pt also informed that he will need to schedule 7 week follow up after cpap set up.  Pt is ok with this plan.    HOLLI [place]

## 2019-03-12 ENCOUNTER — APPOINTMENT (OUTPATIENT)
Dept: GENERAL RADIOLOGY | Facility: CLINIC | Age: 39
End: 2019-03-12
Attending: EMERGENCY MEDICINE
Payer: COMMERCIAL

## 2019-03-12 ENCOUNTER — HOSPITAL ENCOUNTER (EMERGENCY)
Facility: CLINIC | Age: 39
Discharge: HOME | End: 2019-03-12
Attending: EMERGENCY MEDICINE | Admitting: EMERGENCY MEDICINE
Payer: COMMERCIAL

## 2019-03-12 VITALS — DIASTOLIC BLOOD PRESSURE: 91 MMHG | SYSTOLIC BLOOD PRESSURE: 128 MMHG | RESPIRATION RATE: 23 BRPM | HEART RATE: 82 BPM

## 2019-03-12 DIAGNOSIS — R07.9 CHEST PAIN, UNSPECIFIED TYPE: ICD-10-CM

## 2019-03-12 LAB
ANION GAP SERPL CALCULATED.3IONS-SCNC: 6 MMOL/L (ref 3–14)
BASOPHILS # BLD AUTO: 0.1 10E9/L (ref 0–0.2)
BASOPHILS NFR BLD AUTO: 0.7 %
BUN SERPL-MCNC: 22 MG/DL (ref 7–30)
CALCIUM SERPL-MCNC: 8.5 MG/DL (ref 8.5–10.1)
CHLORIDE SERPL-SCNC: 103 MMOL/L (ref 94–109)
CO2 SERPL-SCNC: 28 MMOL/L (ref 20–32)
CREAT SERPL-MCNC: 1.22 MG/DL (ref 0.66–1.25)
D DIMER PPP FEU-MCNC: 0.4 UG/ML FEU (ref 0–0.5)
DIFFERENTIAL METHOD BLD: ABNORMAL
EOSINOPHIL # BLD AUTO: 0.3 10E9/L (ref 0–0.7)
EOSINOPHIL NFR BLD AUTO: 2.2 %
ERYTHROCYTE [DISTWIDTH] IN BLOOD BY AUTOMATED COUNT: 12.9 % (ref 10–15)
GFR SERPL CREATININE-BSD FRML MDRD: 74 ML/MIN/{1.73_M2}
GLUCOSE SERPL-MCNC: 84 MG/DL (ref 70–99)
HCT VFR BLD AUTO: 51 % (ref 40–53)
HGB BLD-MCNC: 16.2 G/DL (ref 13.3–17.7)
IMM GRANULOCYTES # BLD: 0.1 10E9/L (ref 0–0.4)
IMM GRANULOCYTES NFR BLD: 0.6 %
INTERPRETATION ECG - MUSE: NORMAL
INTERPRETATION ECG - MUSE: NORMAL
LYMPHOCYTES # BLD AUTO: 3 10E9/L (ref 0.8–5.3)
LYMPHOCYTES NFR BLD AUTO: 21.9 %
MCH RBC QN AUTO: 26.9 PG (ref 26.5–33)
MCHC RBC AUTO-ENTMCNC: 31.8 G/DL (ref 31.5–36.5)
MCV RBC AUTO: 85 FL (ref 78–100)
MONOCYTES # BLD AUTO: 1.1 10E9/L (ref 0–1.3)
MONOCYTES NFR BLD AUTO: 7.8 %
NEUTROPHILS # BLD AUTO: 9 10E9/L (ref 1.6–8.3)
NEUTROPHILS NFR BLD AUTO: 66.8 %
NRBC # BLD AUTO: 0 10*3/UL
NRBC BLD AUTO-RTO: 0 /100
PLATELET # BLD AUTO: 253 10E9/L (ref 150–450)
POTASSIUM SERPL-SCNC: 3.3 MMOL/L (ref 3.4–5.3)
RBC # BLD AUTO: 6.03 10E12/L (ref 4.4–5.9)
SODIUM SERPL-SCNC: 137 MMOL/L (ref 133–144)
TROPONIN I SERPL-MCNC: <0.015 UG/L (ref 0–0.04)
TROPONIN I SERPL-MCNC: <0.015 UG/L (ref 0–0.04)
WBC # BLD AUTO: 13.4 10E9/L (ref 4–11)

## 2019-03-12 PROCEDURE — 71046 X-RAY EXAM CHEST 2 VIEWS: CPT

## 2019-03-12 PROCEDURE — 85379 FIBRIN DEGRADATION QUANT: CPT | Performed by: EMERGENCY MEDICINE

## 2019-03-12 PROCEDURE — 25000132 ZZH RX MED GY IP 250 OP 250 PS 637: Performed by: EMERGENCY MEDICINE

## 2019-03-12 PROCEDURE — 85025 COMPLETE CBC W/AUTO DIFF WBC: CPT | Performed by: EMERGENCY MEDICINE

## 2019-03-12 PROCEDURE — 80048 BASIC METABOLIC PNL TOTAL CA: CPT | Performed by: EMERGENCY MEDICINE

## 2019-03-12 PROCEDURE — 84484 ASSAY OF TROPONIN QUANT: CPT | Performed by: EMERGENCY MEDICINE

## 2019-03-12 PROCEDURE — 25000125 ZZHC RX 250: Performed by: EMERGENCY MEDICINE

## 2019-03-12 PROCEDURE — 99285 EMERGENCY DEPT VISIT HI MDM: CPT | Mod: 25

## 2019-03-12 PROCEDURE — 93005 ELECTROCARDIOGRAM TRACING: CPT | Mod: 76

## 2019-03-12 PROCEDURE — 93005 ELECTROCARDIOGRAM TRACING: CPT

## 2019-03-12 RX ORDER — NITROGLYCERIN 0.4 MG/1
0.4 TABLET SUBLINGUAL EVERY 5 MIN PRN
Status: DISCONTINUED | OUTPATIENT
Start: 2019-03-12 | End: 2019-03-12 | Stop reason: HOSPADM

## 2019-03-12 RX ORDER — ACETAMINOPHEN 325 MG/1
650 TABLET ORAL ONCE
Status: COMPLETED | OUTPATIENT
Start: 2019-03-12 | End: 2019-03-12

## 2019-03-12 RX ORDER — ASPIRIN 81 MG/1
324 TABLET, CHEWABLE ORAL ONCE
Status: COMPLETED | OUTPATIENT
Start: 2019-03-12 | End: 2019-03-12

## 2019-03-12 RX ADMIN — ASPIRIN 81 MG 324 MG: 81 TABLET ORAL at 03:19

## 2019-03-12 RX ADMIN — NITROGLYCERIN 0.4 MG: 0.4 TABLET SUBLINGUAL at 03:19

## 2019-03-12 RX ADMIN — NITROGLYCERIN 0.4 MG: 0.4 TABLET SUBLINGUAL at 03:27

## 2019-03-12 RX ADMIN — ACETAMINOPHEN 650 MG: 325 TABLET, FILM COATED ORAL at 03:52

## 2019-03-12 RX ADMIN — LIDOCAINE HYDROCHLORIDE 30 ML: 20 SOLUTION ORAL; TOPICAL at 03:53

## 2019-03-12 RX ADMIN — RANITIDINE 150 MG: 150 TABLET ORAL at 03:52

## 2019-03-12 ASSESSMENT — ENCOUNTER SYMPTOMS
VOMITING: 0
BACK PAIN: 0
SHORTNESS OF BREATH: 0
COUGH: 0
BLOOD IN STOOL: 0
NAUSEA: 0

## 2019-03-12 NOTE — DISCHARGE INSTRUCTIONS
An outpatient stress test was ordered for you.  The cardiac testing department will call you to schedule this in the next several days.  Please follow up on this result with your primary care doctor.      Discharge Instructions  Chest Pain    You have been seen today for chest pain or discomfort.  At this time, your provider has found no signs that your chest pain is due to a serious or life-threatening condition, (or you have declined more testing and/or admission to the hospital). However, sometimes there is a serious problem that does not show up right away. Your evaluation today may not be complete and you may need further testing and evaluation.     Generally, every Emergency Department visit should have a follow-up clinic visit with either a primary or a specialty clinic/provider. Please follow-up as instructed by your emergency provider today.  Return to the Emergency Department if:  Your chest pain changes, gets worse, starts to happen more often, or comes with less activity.  You are newly short of breath.  You get very weak or tired.  You pass out or faint.  You have any new symptoms, like fever, cough, numb legs, or you cough up blood.  You have anything else that worries you.    Until you follow-up with your regular provider, please do the following:  Take one aspirin daily unless you have an allergy or are told not to by your provider.  If a stress test appointment has been made, go to the appointment.  If you have questions, contact your regular provider.  Follow-up with your regular provider/clinic as directed; this is very important.    If you were given a prescription for medicine here today, be sure to read all of the information (including the package insert) that comes with your prescription.  This will include important information about the medicine, its side effects, and any warnings that you need to know about.  The pharmacist who fills the prescription can provide more information and answer  questions you may have about the medicine.  If you have questions or concerns that the pharmacist cannot address, please call or return to the Emergency Department.       Remember that you can always come back to the Emergency Department if you are not able to see your regular provider in the amount of time listed above, if you get any new symptoms, or if there is anything that worries you.

## 2019-03-12 NOTE — ED AVS SNAPSHOT
[facility] Emergency Department  [address]  [facility] [address]  Phone:  [phone]  Fax:  [phone]                                    Frank Flowers   MRN: [id number]    Department:  [facility] Emergency Department   Date of Visit:  3/12/2019           After Visit Summary Signature Page    I have received my discharge instructions, and my questions have been answered. I have discussed any challenges I see with this plan with the nurse or doctor.    ..........................................................................................................................................  Patient/Patient Representative Signature      ..........................................................................................................................................  Patient Representative Print Name and Relationship to Patient    ..................................................               ................................................  Date                                   Time    ..........................................................................................................................................  Reviewed by Signature/Title    ...................................................              ..............................................  Date                                               Time          22EPIC Rev 08/18

## 2019-03-12 NOTE — ED PROVIDER NOTES
History     Chief Complaint:  Chest Pain    HPI   Frank Flowers is a 38 year old male who presents with chest pain. The patient was waking up the stairs after work about an hour ago when he felt left sided chest pain. The patient notes his pain was squeezing. The patient notes the pain is worse when he takes a deep breath and seems slightly better when he sits up. He notes his pain is a 4. The patient notes his day was stressful but otherwise normal. Th ept notes he was sick a week ago with cold and congestion symptoms and notes he had some chest pain at that time but primarily with coughing. The patient notes he has been diagnosed with costochondritis in the past but states this feels different. The patient denies leg pain or swelling. He denies recent cough, back pain, arm pain, shoulder pain, nausea, vomiting, bloody stools, or abdominal pain. He has a hx of GERD and frequently takes TUMS.  No current abdominal pain.    Cardiac/PE/DVT Risk Factors:  History of hypertension - yes  History of hyperlipidemia - yes  History of smoking - yes  Personal history of PE/DVT - no  Family history of PE/DVT - no  Recent travel - no  Recent surgery - no  Other immobilizations - no    Allergies:  Lisinopril    Medications:    Avapro    Past Medical History:    Hypertension  ROBB  Hyperlipidemia  Smoker  Obesity     Past Surgical History:    Abscessed tooth removal  Open reduction internal fixation rodding     Family History:    Mother: depression, anxiety  Sister: sleep apnea, anxiety    Social History:  Smoking Status: Current Every Day Smoker   Smokeless Tobacco: Never Used  Alcohol Use: Positive  Marital Status:  Single      Review of Systems   Respiratory: Negative for cough and shortness of breath.    Cardiovascular: Positive for chest pain. Negative for leg swelling.   Gastrointestinal: Negative for blood in stool, nausea and vomiting.   Musculoskeletal: Negative for back pain.        No arm pain or shoulder pain     All other systems reviewed and are negative.    Physical Exam     Patient Vitals for the past 24 hrs:   BP Pulse Heart Rate Resp   03/12/19 0500 134/79 86 85 19   03/12/19 0445 134/73 84 81 --   03/12/19 0430 127/75 85 84 20   03/12/19 0415 125/86 87 85 --   03/12/19 0345 129/76 90 89 20   03/12/19 0330 135/83 92 92 27   03/12/19 0315 (!) 139/97 89 92 14     Physical Exam  Gen: alert  HEENT: PERRL, oropharynx clear  Neck: normal ROM  CV: RRR, no murmurs, 2+ distal pulses in all 4 extremities  Chest: no tenderness over the chest wall  Pulm: breath sounds equal, lungs clear  Abd: Soft, nontender  Back: no evidence of injury  MSK: no lower extremity edema, no calf tenderness  Skin: no rash  Neuro: alert, appropriate conversation and interaction  Emergency Department Course     ECG:  ECG taken at 0304, ECG read at 0310  Normal sinus rhythm  Rightward axis  Borderline ECG  Rate 100 bpm. NV interval 134 ms. QRS duration 88 ms. QT/QTc 356/459 ms. P-R-T axes 68 90 54.    ECG:  ECG taken at 0418, ECG read at 0420  Normal sinus rhythm  Rightward axis  Borderline ECG  Rate 87 bpm. NV interval 142 ms. QRS duration 88 ms. QT/QTc 386/464 ms. P-R-T axes 35 91 67.    Imaging:  Radiology findings were communicated with the patient who voiced understanding of the findings.    XR Chest  No acute abnormality.   Reading per radiology    Laboratory:  Laboratory findings were communicated with the patient who voiced understanding of the findings.    CBC: WBC 13.4(H), HGB 16.2,   BMP: potassium 3.3(L) o/w WNL (Creatinine 1.22)  Troponin (Collected 0313): <0.015  D Dimer (Collected 0313): 0.4    Troponin (Collected 05:20): <0.015    Interventions:  0319 Asprin 324 mg Oral  0319 Nitroglycerin 0.4 mg Sublingual  0327 Nitroglycerin 0.4 mg Sublingual   0352 Tylenol 650 mg Oral  0352 Zantac 150 mg Oral  0353 GI Cocktail 30 mL Oral     Emergency Department Course:    0310 Nursing notes and vitals reviewed.    0312 I performed an exam of  the patient as documented above.     0313 IV was inserted and blood was drawn for laboratory testing, results above.    0350 I returned to update the patient. The patient notes his pain is uncharged after nitroglycerin. The patient notes he has frequent heart burn and indigestion but he states this does to feel like his normal heart burn.     0358 The patient was sent for a XR Chest while in the emergency department, results above.     0506 I returned to update the patient. The patient is chest pain free.     0520 A delta troponin was obtained as noted above.     0600 I personally reviewed the laboratory and imaging results with the patient and answered all related questions prior to discharge.    Impression & Plan      Medical Decision Making:  Frank Flowers is a 38 year old male who presents to the emergency department today for evaluation of chest pain. Recent cough and congestion are improving. Multiple risk factors for CAD noted. Heart score calculated to be 2. Patient with positional chest pressure here.  When clarified, patient has not had true exertional anginal symptoms- he felt out of breath with sick with URI symptoms but no true anginal CP with activities. Symptoms unchanged after nitroglycerin and Asprin but symptoms did improve with GI interventions as noted above. Serial EKG no evidence ischemia. Troponin and delta troponin both negative. Low risk for pulmonary embolism and d dimer negative, therefore I felt pulmonary embolism was excluded. Normal aortic contour with a negative d dimer, therefore doubt aortic catastrophe. Although patient does have cardiac risk factors, symptoms are overall more suggestive of GI cause. We discussed return to the ED for return of or progression of chest pain or exertional symptom. Otherwise, start Zantac, close follow up with primary care physician, and cardiac stress test ordered.      Diagnosis:    ICD-10-CM    1. Chest pain, unspecified type R07.9 Echo Stress  Echocardiogram        Disposition:   The patient is discharged to home.    Discharge Medications:  Recommended daily 325 mg asprin pending stress test and PCP follow up      Review of your medicines      UNREVIEWED medicines. Ask your doctor about these medicines      Dose / Directions   irbesartan 150 MG tablet  Commonly known as:  AVAPRO  Used for:  Essential hypertension      Dose:  150 mg  Take 1 tablet (150 mg) by mouth At Bedtime  Quantity:  30 tablet  Refills:  1        START taking      Dose / Directions   ranitidine 150 MG tablet  Commonly known as:  ZANTAC      Dose:  150 mg  Take 1 tablet (150 mg) by mouth 2 times daily for 10 days  Quantity:  20 tablet  Refills:  0           Where to get your medicines      Some of these will need a paper prescription and others can be bought over the counter. Ask your nurse if you have questions.    Bring a paper prescription for each of these medications    ranitidine 150 MG tablet         Scribe Disclosure:  CHET Zaria Maia, am serving as a scribe at 3:12 AM on 3/12/2019 to document services personally performed by Carey Hayes MD based on my observations and the provider's statements to me.  [facility] EMERGENCY DEPARTMENT       Carey Hayes MD  03/12/19 0656

## 2019-03-21 NOTE — TELEPHONE ENCOUNTER
Called patient to schedule his CPAP setup. Left voicemail to call [facility] at [phone] for scheduling.

## 2019-03-26 ENCOUNTER — TELEPHONE (OUTPATIENT)
Dept: SLEEP MEDICINE | Facility: CLINIC | Age: 39
End: 2019-03-26

## 2019-03-26 NOTE — TELEPHONE ENCOUNTER
Pt called to schedule cpap set up and follow up appointment.  No answer and unable to leave message.  My chart message will be sent to pt.    HOLLI [place]

## 2019-04-01 ENCOUNTER — DOCUMENTATION ONLY (OUTPATIENT)
Dept: SLEEP MEDICINE | Facility: CLINIC | Age: 39
End: 2019-04-01
Payer: COMMERCIAL

## 2019-04-01 NOTE — PROGRESS NOTES
Patient was offered choice of vendor and chose [facility].  Patient Frank Flowers was set up at [place] on April 1, 2019. Patient received a Resmed AirSense 10 Auto. Pressures were set at 5-15 cm H2O.   Patient s ramp is 5 cm H2O for Auto and FLEX/EPR is 2.  Patient received a [employer] Dreamwear  Full Face mask size Medium, heated tubing and heated humidifier.  Patient is enrolled in the STM Program and does not need to meet compliance. Patient doesn't have a follow up scheduled.   FRANSISCO SIMMONS

## 2019-04-04 ENCOUNTER — DOCUMENTATION ONLY (OUTPATIENT)
Dept: SLEEP MEDICINE | Facility: CLINIC | Age: 39
End: 2019-04-04
Payer: COMMERCIAL

## 2019-04-04 NOTE — PROGRESS NOTES
3 DAY STM VISIT    Diagnostic AHI:  58.4 HST    Patient contacted for 3 day STM visit.  Unable to leave message.      Device type: Auto-CPAP  PAP settings from order::  CPAP min 5 cm  H20       CPAP max 15 cm  H20        Mask type:    Nasal Mask     Device settings from machine      Min CPAP 5.0            Max CPAP 15.0      Assessment: Nightly usage over four hours.  Action plan: Pt to have f/u 14 day STM visit.  Patient has a follow up visit scheduled:   no.

## 2019-04-16 ENCOUNTER — DOCUMENTATION ONLY (OUTPATIENT)
Dept: SLEEP MEDICINE | Facility: CLINIC | Age: 39
End: 2019-04-16

## 2019-04-16 NOTE — PROGRESS NOTES
14  DAY STM VISIT    Diagnostic AHI:   58.4 HST    Data only recheck unable to leave voicemail     Assessment: Pt meeting objective benchmarks.       Action plan: pt to have 30 day STM visit.      Device type: Auto-CPAP    PAP settings: CPAP min 5.0 cm  H20       CPAP max 15.0 cm  H20           95th% pressure 14.9 cm  H20     Mask type:  Nasal Mask    Objective measures: 14 day rolling measures      Compliance  71 %      Leak  17.2 lpm  last  upload      AHI 1.6   last  upload      Average number of minutes 319      Objective measure goal  Compliance   Goal >70%  Leak   Goal < 24 lpm  AHI  Goal < 5  Usage  Goal >240

## 2019-05-02 ENCOUNTER — DOCUMENTATION ONLY (OUTPATIENT)
Dept: SLEEP MEDICINE | Facility: CLINIC | Age: 39
End: 2019-05-02

## 2019-05-02 NOTE — PROGRESS NOTES
30 DAY STM VISIT    Diagnostic AHI:   58.4 HST    Message left for patient to return call     Assessment: Pt not meeting objective benchmarks for compliance     Action plan: waiting for patient to return call.   Patient has not scheduled a follow up visit with Dr. Moore  Device type: Auto-CPAP  PAP settings: CPAP min 5.0 cm  H20     CPAP max 15.0 cm  H20    95th% pressure 14.4 cm  H20   Mask type:  Nasal Mask  Objective measures: 14 day rolling measures      Compliance  35 %      Leak  17.87 lpm  last  upload      AHI 0.73   last  upload      Average number of minutes 172      Objective measure goal  Compliance   Goal >70%  Leak   Goal < 24 lpm  AHI  Goal < 5  Usage  Goal >240

## 2019-05-10 DIAGNOSIS — G47.33 OSA (OBSTRUCTIVE SLEEP APNEA): Primary | ICD-10-CM

## 2019-05-15 ENCOUNTER — OFFICE VISIT (OUTPATIENT)
Dept: INTERNAL MEDICINE | Facility: CLINIC | Age: 39
End: 2019-05-15
Payer: COMMERCIAL

## 2019-05-15 VITALS
HEART RATE: 91 BPM | DIASTOLIC BLOOD PRESSURE: 90 MMHG | WEIGHT: 259.7 LBS | OXYGEN SATURATION: 95 % | HEIGHT: 70 IN | RESPIRATION RATE: 18 BRPM | TEMPERATURE: 98.1 F | BODY MASS INDEX: 37.18 KG/M2 | SYSTOLIC BLOOD PRESSURE: 130 MMHG

## 2019-05-15 DIAGNOSIS — E66.01 MORBID OBESITY (H): ICD-10-CM

## 2019-05-15 DIAGNOSIS — Z72.0 TOBACCO ABUSE DISORDER: ICD-10-CM

## 2019-05-15 DIAGNOSIS — I10 ESSENTIAL HYPERTENSION: Primary | ICD-10-CM

## 2019-05-15 DIAGNOSIS — I10 ESSENTIAL HYPERTENSION: ICD-10-CM

## 2019-05-15 DIAGNOSIS — E78.2 MIXED HYPERLIPIDEMIA: ICD-10-CM

## 2019-05-15 LAB — WBC # BLD AUTO: 9.9 10E9/L (ref 4–11)

## 2019-05-15 PROCEDURE — 85048 AUTOMATED LEUKOCYTE COUNT: CPT | Performed by: INTERNAL MEDICINE

## 2019-05-15 PROCEDURE — 36415 COLL VENOUS BLD VENIPUNCTURE: CPT | Performed by: INTERNAL MEDICINE

## 2019-05-15 PROCEDURE — 99214 OFFICE O/P EST MOD 30 MIN: CPT | Performed by: INTERNAL MEDICINE

## 2019-05-15 PROCEDURE — 84132 ASSAY OF SERUM POTASSIUM: CPT | Performed by: INTERNAL MEDICINE

## 2019-05-15 RX ORDER — IRBESARTAN 150 MG/1
150 TABLET ORAL AT BEDTIME
Qty: 90 TABLET | Refills: 1 | Status: SHIPPED | OUTPATIENT
Start: 2019-05-15 | End: 2019-10-24

## 2019-05-15 ASSESSMENT — MIFFLIN-ST. JEOR: SCORE: 2099.24

## 2019-05-15 NOTE — TELEPHONE ENCOUNTER
"Requested Prescriptions   Pending Prescriptions Disp Refills     irbesartan (AVAPRO) 150 MG tablet [Pharmacy Med Name: IRBESARTAN 150MG TABS] 30 tablet 1     Sig: TAKE 1 TABLET BY MOUTH AT BEDTIME   Last Written Prescription Date:  05/15/2019  Last Fill Quantity: 90,  # refills: 1   Last office visit: 5/15/2019 with prescribing provider:     Future Office Visit:      Angiotensin-II Receptors Failed - 5/15/2019 12:36 PM        Failed - Blood pressure under 140/90 in past 12 months     BP Readings from Last 3 Encounters:   05/15/19 130/90   03/12/19 (!) 128/91   03/01/19 (!) 134/98                 Failed - Normal serum potassium on file in past 12 months     Recent Labs   Lab Test 03/12/19  0313   POTASSIUM 3.3*                    Passed - Recent (12 mo) or future (30 days) visit within the authorizing provider's specialty     Patient had office visit in the last 12 months or has a visit in the next 30 days with authorizing provider or within the authorizing provider's specialty.  See \"Patient Info\" tab in inbasket, or \"Choose Columns\" in Meds & Orders section of the refill encounter.              Passed - Medication is active on med list        Passed - Patient is age 18 or older        Passed - Normal serum creatinine on file in past 12 months     Recent Labs   Lab Test 03/12/19  0313   CR 1.22             "

## 2019-05-16 LAB — POTASSIUM SERPL-SCNC: 4.4 MMOL/L (ref 3.4–5.3)

## 2019-05-16 RX ORDER — IRBESARTAN 150 MG/1
TABLET ORAL
Qty: 30 TABLET | Refills: 1 | OUTPATIENT
Start: 2019-05-16

## 2019-10-01 ENCOUNTER — DOCUMENTATION ONLY (OUTPATIENT)
Dept: SLEEP MEDICINE | Facility: CLINIC | Age: 39
End: 2019-10-01

## 2019-10-01 NOTE — PROGRESS NOTES
6 month [facility]    STM Recheck Visit     Diagnostic AHI:   58.4  HST    Message left for patient to return call     Assessment: Pt not meeting objective benchmarks for leak    Action plan: waiting for patient to return call.   pt to follow up per provider request       Device type: Auto-CPAP  PAP settings: CPAP min 5.0 cm  H20     CPAP max 15.0 cm  H20        95th% pressure 14 cm  H20   Objective measures: 14 day rolling measures      Compliance  85 %      Leak  26.83 lpm  last  Upload-historically elevated.       AHI 0.21   last  upload      Average number of minutes 388      Objective measure goal  Compliance   Goal >70%  Leak   Goal < 24 lpm  AHI  Goal < 5  Usage  Goal >240

## 2019-10-03 ENCOUNTER — HEALTH MAINTENANCE LETTER (OUTPATIENT)
Age: 39
End: 2019-10-03

## 2019-10-24 ENCOUNTER — OFFICE VISIT (OUTPATIENT)
Dept: INTERNAL MEDICINE | Facility: CLINIC | Age: 39
End: 2019-10-24
Payer: COMMERCIAL

## 2019-10-24 VITALS
DIASTOLIC BLOOD PRESSURE: 88 MMHG | WEIGHT: 269.3 LBS | HEART RATE: 93 BPM | SYSTOLIC BLOOD PRESSURE: 135 MMHG | RESPIRATION RATE: 18 BRPM | OXYGEN SATURATION: 98 % | TEMPERATURE: 97.7 F | BODY MASS INDEX: 38.55 KG/M2 | HEIGHT: 70 IN

## 2019-10-24 DIAGNOSIS — I10 ESSENTIAL HYPERTENSION: ICD-10-CM

## 2019-10-24 PROCEDURE — 99213 OFFICE O/P EST LOW 20 MIN: CPT | Performed by: NURSE PRACTITIONER

## 2019-10-24 RX ORDER — IRBESARTAN 150 MG/1
150 TABLET ORAL AT BEDTIME
Qty: 90 TABLET | Refills: 1 | Status: SHIPPED | OUTPATIENT
Start: 2019-10-24 | End: 2020-05-16

## 2019-10-24 ASSESSMENT — MIFFLIN-ST. JEOR: SCORE: 2137.79

## 2019-10-24 NOTE — PROGRESS NOTES
Subjective     Frank Flowers is a 39 year old male who presents to clinic today for the following health issues:  Patient is here for a hypertension check.  HPI   Hypertension Follow-up      Do you check your blood pressure regularly outside of the clinic? Yes     Are you following a low salt diet? Yes    Are your blood pressures ever more than 140 on the top number (systolic) OR more   than 90 on the bottom number (diastolic), for example 140/90? No      How many servings of fruits and vegetables do you eat daily?  2-3    On average, how many sweetened beverages do you drink each day (soda, juice, sweet tea, etc)?   0    How many days per week do you miss taking your medication? 0        Has decreased ETOH use he reports, but evasive on amount  Continues to smoke  No regular exercise.  Has PE scheduled next week    Patient Active Problem List   Diagnosis     Essential hypertension     Mixed hyperlipidemia     Obesity (BMI 35.0-39.9) with comorbidity (H)     Severe obstructive sleep apnea     Tobacco abuse disorder     Past Surgical History:   Procedure Laterality Date     DENTAL SURGERY      abscessed tooth removed under anesthesia     OPEN REDUCTION INTERNAL FIXATION RODDING INTRAMEDULLARY TIBIA Left 1/14/2018    Procedure: OPEN REDUCTION INTERNAL FIXATION RODDING INTRAMEDULLARY TIBIA;  Intramedullary nailing, left tibial shaft fracture;  Surgeon: Jian Burgos MD;  Location:  OR       Social History     Tobacco Use     Smoking status: Current Every Day Smoker     Packs/day: 0.50     Types: Cigarettes     Smokeless tobacco: Never Used   Substance Use Topics     Alcohol use: Yes     Comment: Occas     Family History   Problem Relation Age of Onset     Depression Mother      Anxiety Disorder Mother      Unknown/Adopted Father      Unknown/Adopted Maternal Grandmother      Unknown/Adopted Maternal Grandfather      Unknown/Adopted Paternal Grandmother      Unknown/Adopted Paternal Grandfather       "Unknown/Adopted Brother      Anxiety Disorder Sister      Sleep Apnea Sister          Current Outpatient Medications   Medication Sig Dispense Refill     irbesartan (AVAPRO) 150 MG tablet Take 1 tablet (150 mg) by mouth At Bedtime 90 tablet 1     BP Readings from Last 3 Encounters:   10/24/19 135/88   05/15/19 130/90   03/12/19 (!) 128/91    Wt Readings from Last 3 Encounters:   10/24/19 122.2 kg (269 lb 4.8 oz)   05/15/19 117.8 kg (259 lb 11.2 oz)   03/01/19 120.6 kg (265 lb 14.4 oz)                      Reviewed and updated as needed this visit by Provider         Review of Systems   ROS COMP: Constitutional, HEENT, cardiovascular, pulmonary, gi and gu systems are negative, except as otherwise noted.      Objective    /88 (BP Location: Right arm, Patient Position: Sitting, Cuff Size: Adult Regular)   Pulse 93   Temp 97.7  F (36.5  C) (Oral)   Resp 18   Ht 1.778 m (5' 10\")   Wt 122.2 kg (269 lb 4.8 oz)   SpO2 98%   BMI 38.64 kg/m      Physical Exam   GENERAL: alert, no distress and obese  RESP: lungs clear to auscultation - no rales, rhonchi or wheezes  CV: regular rate and rhythm, normal S1 S2, no S3 or S4, no murmur, click or rub, no peripheral edema and peripheral pulses strong  PSYCH: mentation appears normal, tangential and anxious            Assessment & Plan       ICD-10-CM    1. Essential hypertension I10 irbesartan (AVAPRO) 150 MG tablet        Tobacco Cessation:   reports that he has been smoking cigarettes. He has been smoking about 0.50 packs per day. He has never used smokeless tobacco.  Tobacco Cessation Action Plan: Information offered: Patient not interested at this time      BMI:   Estimated body mass index is 38.64 kg/m  as calculated from the following:    Height as of this encounter: 1.778 m (5' 10\").    Weight as of this encounter: 122.2 kg (269 lb 4.8 oz).   Weight management plan: Discussed healthy diet and exercise guidelines        Work on exercise, wt loss, smoking " cessation  See PCP next week as planned, fasting    Tara Lorenzana, KATINA  [facility]

## 2019-10-24 NOTE — NURSING NOTE
"/88 (BP Location: Right arm, Patient Position: Sitting, Cuff Size: Adult Regular)   Pulse 93   Temp 97.7  F (36.5  C) (Oral)   Resp 18   Ht 1.778 m (5' 10\")   Wt 122.2 kg (269 lb 4.8 oz)   SpO2 98%   BMI 38.64 kg/m    Patient is here for a hypertension check.  "

## 2019-11-01 ENCOUNTER — OFFICE VISIT (OUTPATIENT)
Dept: INTERNAL MEDICINE | Facility: CLINIC | Age: 39
End: 2019-11-01
Payer: COMMERCIAL

## 2019-11-01 VITALS
WEIGHT: 264.8 LBS | OXYGEN SATURATION: 97 % | HEART RATE: 95 BPM | DIASTOLIC BLOOD PRESSURE: 84 MMHG | TEMPERATURE: 98.5 F | SYSTOLIC BLOOD PRESSURE: 122 MMHG | RESPIRATION RATE: 23 BRPM | BODY MASS INDEX: 37.91 KG/M2 | HEIGHT: 70 IN

## 2019-11-01 DIAGNOSIS — E78.2 MIXED HYPERLIPIDEMIA: ICD-10-CM

## 2019-11-01 DIAGNOSIS — E66.01 MORBID OBESITY (H): ICD-10-CM

## 2019-11-01 DIAGNOSIS — Z72.0 TOBACCO ABUSE DISORDER: ICD-10-CM

## 2019-11-01 DIAGNOSIS — Z00.00 ROUTINE HISTORY AND PHYSICAL EXAMINATION OF ADULT: Primary | ICD-10-CM

## 2019-11-01 LAB — HGB BLD-MCNC: 15.6 G/DL (ref 13.3–17.7)

## 2019-11-01 PROCEDURE — 36415 COLL VENOUS BLD VENIPUNCTURE: CPT | Performed by: INTERNAL MEDICINE

## 2019-11-01 PROCEDURE — 99396 PREV VISIT EST AGE 40-64: CPT | Performed by: INTERNAL MEDICINE

## 2019-11-01 PROCEDURE — 80061 LIPID PANEL: CPT | Performed by: INTERNAL MEDICINE

## 2019-11-01 PROCEDURE — 80053 COMPREHEN METABOLIC PANEL: CPT | Performed by: INTERNAL MEDICINE

## 2019-11-01 PROCEDURE — 85018 HEMOGLOBIN: CPT | Performed by: INTERNAL MEDICINE

## 2019-11-01 ASSESSMENT — ENCOUNTER SYMPTOMS
PALPITATIONS: 0
ARTHRALGIAS: 1
PARESTHESIAS: 0
DIZZINESS: 0
WEAKNESS: 0
HEMATOCHEZIA: 0
CONSTIPATION: 0
DIARRHEA: 0
HEMATURIA: 0
NERVOUS/ANXIOUS: 0
HEARTBURN: 0
EYE PAIN: 0
NAUSEA: 0
FEVER: 0
CHILLS: 0
FREQUENCY: 0
CONSTIPATION: 1
SHORTNESS OF BREATH: 0
JOINT SWELLING: 0
DYSURIA: 0
COUGH: 0
SORE THROAT: 0
MYALGIAS: 0
ARTHRALGIAS: 0
HEADACHES: 0
ABDOMINAL PAIN: 0

## 2019-11-01 ASSESSMENT — PATIENT HEALTH QUESTIONNAIRE - PHQ9
10. IF YOU CHECKED OFF ANY PROBLEMS, HOW DIFFICULT HAVE THESE PROBLEMS MADE IT FOR YOU TO DO YOUR WORK, TAKE CARE OF THINGS AT HOME, OR GET ALONG WITH OTHER PEOPLE: SOMEWHAT DIFFICULT
SUM OF ALL RESPONSES TO PHQ QUESTIONS 1-9: 5
SUM OF ALL RESPONSES TO PHQ QUESTIONS 1-9: 5

## 2019-11-01 ASSESSMENT — MIFFLIN-ST. JEOR: SCORE: 2117.37
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
## 2019-11-01 NOTE — NURSING NOTE
"/84   Pulse 95   Temp 98.5  F (36.9  C) (Oral)   Resp 23   Ht 1.778 m (5' 10\")   Wt 120.1 kg (264 lb 12.8 oz)   SpO2 97%   BMI 37.99 kg/m    Patient in for annual Male Physical.  Nasreen Venegas CMA    "

## 2019-11-02 LAB
ALBUMIN SERPL-MCNC: 3.8 G/DL (ref 3.4–5)
ALP SERPL-CCNC: 80 U/L (ref 40–150)
ALT SERPL W P-5'-P-CCNC: 49 U/L (ref 0–70)
ANION GAP SERPL CALCULATED.3IONS-SCNC: 5 MMOL/L (ref 3–14)
AST SERPL W P-5'-P-CCNC: 23 U/L (ref 0–45)
BILIRUB SERPL-MCNC: 0.6 MG/DL (ref 0.2–1.3)
BUN SERPL-MCNC: 16 MG/DL (ref 7–30)
CALCIUM SERPL-MCNC: 8.5 MG/DL (ref 8.5–10.1)
CHLORIDE SERPL-SCNC: 106 MMOL/L (ref 94–109)
CHOLEST SERPL-MCNC: 207 MG/DL
CO2 SERPL-SCNC: 26 MMOL/L (ref 20–32)
CREAT SERPL-MCNC: 1.05 MG/DL (ref 0.66–1.25)
GFR SERPL CREATININE-BSD FRML MDRD: 88 ML/MIN/{1.73_M2}
GLUCOSE SERPL-MCNC: 96 MG/DL (ref 70–99)
HDLC SERPL-MCNC: 40 MG/DL
LDLC SERPL CALC-MCNC: 140 MG/DL
NONHDLC SERPL-MCNC: 167 MG/DL
POTASSIUM SERPL-SCNC: 4.3 MMOL/L (ref 3.4–5.3)
PROT SERPL-MCNC: 7.6 G/DL (ref 6.8–8.8)
SODIUM SERPL-SCNC: 137 MMOL/L (ref 133–144)
TRIGL SERPL-MCNC: 134 MG/DL

## 2020-07-14 ENCOUNTER — OFFICE VISIT (OUTPATIENT)
Dept: INTERNAL MEDICINE | Facility: CLINIC | Age: 40
End: 2020-07-14
Payer: COMMERCIAL

## 2020-07-14 VITALS
BODY MASS INDEX: 39.65 KG/M2 | DIASTOLIC BLOOD PRESSURE: 90 MMHG | TEMPERATURE: 97.2 F | HEART RATE: 90 BPM | HEIGHT: 70 IN | WEIGHT: 277 LBS | OXYGEN SATURATION: 98 % | SYSTOLIC BLOOD PRESSURE: 139 MMHG

## 2020-07-14 DIAGNOSIS — H92.03 OTALGIA, BILATERAL: Primary | ICD-10-CM

## 2020-07-14 PROCEDURE — 99213 OFFICE O/P EST LOW 20 MIN: CPT | Performed by: NURSE PRACTITIONER

## 2020-07-14 ASSESSMENT — MIFFLIN-ST. JEOR: SCORE: 2167.71

## 2020-07-14 NOTE — PROGRESS NOTES
Subjective     Frank Flowers is a 40 year old male who presents to clinic today for the following health issues:    HPI     Right ear problem for 3 days.    Hypertension Follow-up      Do you check your blood pressure regularly outside of the clinic? No     Are you following a low salt diet? Yes    Are your blood pressures ever more than 140 on the top number (systolic) OR more   than 90 on the bottom number (diastolic), for example 140/90? No      How many servings of fruits and vegetables do you eat daily?  0-1    On average, how many sweetened beverages do you drink each day (Examples: soda, juice, sweet tea, etc.  Do NOT count diet or artificially sweetened beverages)?   0    How many days per week do you exercise enough to make your heart beat faster? 3 or less    How many minutes a day do you exercise enough to make your heart beat faster? 9 or less    How many days per week do you miss taking your medication? 0    Ear pain      Duration: 1 week    Description (location/character/radiation): intermittent R ear pain    Intensity:  mild    Accompanying signs and symptoms: none    History (similar episodes/previous evaluation): None    Precipitating or alleviating factors: None    Therapies tried and outcome: None       Patient Active Problem List   Diagnosis     Essential hypertension     Mixed hyperlipidemia     Obesity (BMI 35.0-39.9) with comorbidity (H)     Severe obstructive sleep apnea     Tobacco abuse disorder     Past Surgical History:   Procedure Laterality Date     DENTAL SURGERY      abscessed tooth removed under anesthesia     OPEN REDUCTION INTERNAL FIXATION RODDING INTRAMEDULLARY TIBIA Left 1/14/2018    Procedure: OPEN REDUCTION INTERNAL FIXATION RODDING INTRAMEDULLARY TIBIA;  Intramedullary nailing, left tibial shaft fracture;  Surgeon: Jian Burgos MD;  Location:  OR       Social History     Tobacco Use     Smoking status: Current Every Day Smoker     Packs/day: 0.50     Types:  "Cigarettes     Smokeless tobacco: Never Used   Substance Use Topics     Alcohol use: Yes     Comment: 6 drinks per week      Family History   Problem Relation Age of Onset     Depression Mother      Anxiety Disorder Mother      Unknown/Adopted Father      Unknown/Adopted Maternal Grandmother      Unknown/Adopted Maternal Grandfather      Unknown/Adopted Paternal Grandmother      Unknown/Adopted Paternal Grandfather      Unknown/Adopted Brother      Anxiety Disorder Sister      Sleep Apnea Sister          Current Outpatient Medications   Medication Sig Dispense Refill     irbesartan (AVAPRO) 150 MG tablet Take 1 tablet (150 mg) by mouth At Bedtime 90 tablet 1     BP Readings from Last 3 Encounters:   07/14/20 (!) 139/90   11/01/19 122/84   10/24/19 135/88    Wt Readings from Last 3 Encounters:   07/14/20 125.6 kg (277 lb)   11/01/19 120.1 kg (264 lb 12.8 oz)   10/24/19 122.2 kg (269 lb 4.8 oz)                      Reviewed and updated as needed this visit by Provider         Review of Systems   Constitutional, HEENT, cardiovascular, pulmonary, gi and gu systems are negative, except as otherwise noted.      Objective    BP (!) 139/90   Pulse 90   Temp 97.2  F (36.2  C) (Oral)   Ht 1.778 m (5' 10\")   Wt 125.6 kg (277 lb)   SpO2 98%   BMI 39.75 kg/m        Physical Exam   GENERAL: alert, no distress and obese  EYES: Eyes grossly normal to inspection, PERRL and conjunctivae and sclerae normal  HENT: both ears: occluded with wax  NECK: no adenopathy, no asymmetry, masses, or scars and thyroid normal to palpation            Assessment & Plan       ICD-10-CM    1. Otalgia, bilateral  H92.03         BMI:   Estimated body mass index is 39.75 kg/m  as calculated from the following:    Height as of this encounter: 1.778 m (5' 10\").    Weight as of this encounter: 125.6 kg (277 lb).           Ears lavaged clear  Avoid objects in ears, RTC prn    Tara Lorenzana, KATINA  [facility]      "

## 2020-10-20 ENCOUNTER — VIRTUAL VISIT (OUTPATIENT)
Dept: INTERNAL MEDICINE | Facility: CLINIC | Age: 40
End: 2020-10-20
Payer: COMMERCIAL

## 2020-10-20 DIAGNOSIS — F32.0 MILD MAJOR DEPRESSION (H): ICD-10-CM

## 2020-10-20 DIAGNOSIS — I10 ESSENTIAL HYPERTENSION: Primary | ICD-10-CM

## 2020-10-20 DIAGNOSIS — E78.2 MIXED HYPERLIPIDEMIA: ICD-10-CM

## 2020-10-20 PROCEDURE — 99214 OFFICE O/P EST MOD 30 MIN: CPT | Mod: 95 | Performed by: INTERNAL MEDICINE

## 2020-10-20 PROCEDURE — 96127 BRIEF EMOTIONAL/BEHAV ASSMT: CPT | Mod: 95 | Performed by: INTERNAL MEDICINE

## 2020-10-20 RX ORDER — BUPROPION HYDROCHLORIDE 150 MG/1
150 TABLET ORAL EVERY MORNING
Qty: 30 TABLET | Refills: 0 | Status: SHIPPED | OUTPATIENT
Start: 2020-10-20 | End: 2020-11-20

## 2020-10-20 RX ORDER — IRBESARTAN 150 MG/1
150 TABLET ORAL AT BEDTIME
Qty: 90 TABLET | Refills: 1 | Status: SHIPPED | OUTPATIENT
Start: 2020-10-20 | End: 2021-04-28

## 2020-10-20 ASSESSMENT — PATIENT HEALTH QUESTIONNAIRE - PHQ9: SUM OF ALL RESPONSES TO PHQ QUESTIONS 1-9: 9

## 2020-10-20 NOTE — PROGRESS NOTES
"Frank Flowers is a 40 year old male who is being evaluated via a billable video visit.  But due to technical issues visit was converted to phone visit.    The patient has been notified of following:     \"This video visit will be conducted via a call between you and your physician/provider. We have found that certain health care needs can be provided without the need for an in-person physical exam.  This service lets us provide the care you need with a video conversation.  If a prescription is necessary we can send it directly to your pharmacy.  If lab work is needed we can place an order for that and you can then stop by our lab to have the test done at a later time.    Video visits are billed at different rates depending on your insurance coverage.  Please reach out to your insurance provider with any questions.    If during the course of the call the physician/provider feels a video visit is not appropriate, you will not be charged for this service.\"    Patient has given verbal consent for Video visit? Yes  How would you like to obtain your AVS? MyChart  If you are dropped from the video visit, the video invite should be resent to: Text to cell phone: [phone]  Will anyone else be joining your video visit? No      Subjective     Phone Start Time: 2:52  PM    Frank Flowers is a 40 year old male who presents today via video visit for the following health issues:    HPI     Hyperlipidemia Follow-Up      Are you regularly taking any medication or supplement to lower your cholesterol?   No    Are you having muscle aches or other side effects that you think could be caused by your cholesterol lowering medication?  No    Hypertension Follow-up      Do you check your blood pressure regularly outside of the clinic? No     Are you following a low salt diet? Yes    Are your blood pressures ever more than 140 on the top number (systolic) OR more   than 90 on the bottom number (diastolic), for example 140/90? " No    Depression Followup    How are you doing with your depression since your last visit? Worsened  - has not been any meds in the past .    Are you having other symptoms that might be associated with depression? Yes:  anxiety    Have you had a significant life event?  OTHER: Passing of two friends     Are you feeling anxious or having panic attacks?   No    Do you have any concerns with your use of alcohol or other drugs? No    PHQ 11/1/2019 10/20/2020   PHQ-9 Total Score 5 9   Q9: Thoughts of better off dead/self-harm past 2 weeks Not at all Not at all            How many servings of fruits and vegetables do you eat daily?  2-3    On average, how many sweetened beverages do you drink each day (Examples: soda, juice, sweet tea, etc.  Do NOT count diet or artificially sweetened beverages)?   1    How many days per week do you exercise enough to make your heart beat faster? 3 or less    How many minutes a day do you exercise enough to make your heart beat faster? 9 or less    How many days per week do you miss taking your medication? 0      Past Medical History:   Diagnosis Date     Hypertension      ROBB (obstructive sleep apnea), severe 2/27/2019       Current Outpatient Medications   Medication Sig Dispense Refill     buPROPion (WELLBUTRIN XL) 150 MG 24 hr tablet Take 1 tablet (150 mg) by mouth every morning 30 tablet 0     irbesartan (AVAPRO) 150 MG tablet Take 1 tablet (150 mg) by mouth At Bedtime 90 tablet 1        Review of Systems   CONSTITUTIONAL: NEGATIVE for fever, chills, change in weight  EYES: NEGATIVE for vision changes or irritation  ENT/MOUTH: NEGATIVE for ear, mouth and throat problems  RESP: NEGATIVE for significant cough or SOB  CV: NEGATIVE for chest pain, palpitations or peripheral edema  MUSCULOSKELETAL: NEGATIVE for significant arthralgias or myalgia  NEURO: NEGATIVE for weakness, dizziness or paresthesias  PSYCHIATRIC:   Depression        Objective         Vitals:  No vitals were obtained  today due to virtual visit.    Physical Exam     GENERAL: Healthy, alert and no distress  RESP: No audible wheeze, cough, .    PSYCH: Mentation appears normal, affect normal/bright, judgement and insight intact, normal speech and appearance well-groomed.           Assessment & Plan     (I10) Essential hypertension  (primary encounter diagnosis)  Plan: irbesartan (AVAPRO) 150 MG tablet refilled.explained clearly about the medication,insructions and side effects. Pt was advised to f/u in clinic 1 month for BP check, also needs labs             (E78.2) Mixed hyperlipidemia  Plan: check lipid panel, making appt next month for labs    (F32.0) Mild major depression (H)  Plan:started on  buPROPion (WELLBUTRIN XL) 150 MG 24 hr tablet as directed.explained clearly about the medication,insructions and side effects. Advised to f/u in clinic in 3-4 wks.             Return in about 4 weeks (around 11/17/2020).    Phone call- 12 mins      Yolanda Parikh MD  [facility]

## 2020-11-07 ENCOUNTER — HEALTH MAINTENANCE LETTER (OUTPATIENT)
Age: 40
End: 2020-11-07

## 2020-11-15 ENCOUNTER — MYC MEDICAL ADVICE (OUTPATIENT)
Dept: INTERNAL MEDICINE | Facility: CLINIC | Age: 40
End: 2020-11-15

## 2020-11-16 ENCOUNTER — VIRTUAL VISIT (OUTPATIENT)
Dept: FAMILY MEDICINE | Facility: OTHER | Age: 40
End: 2020-11-16

## 2020-11-16 NOTE — PROGRESS NOTES
"Date: 2020 13:07:23  Clinician: Viraj Roth  Clinician NPI: [id number]  Patient: Frank Flowers  Patient : 1980  Patient Address: [address] [place], [place] [address] [address]  Patient Phone: [phone]  Visit Protocol: URI  Patient Summary:  Frank is a 40 year old ( : 1980 ) male who initiated a [facility] Visit for COVID-19 (Coronavirus) evaluation and screening. When asked the question \"Please sign me up to receive news, health information and promotions. \", Frank responded \"No\".    Frank states his symptoms started 1-2 days ago.   His symptoms consist of myalgia, chills, malaise, ageusia, a headache, a cough, nausea, and anosmia. He is experiencing mild difficulty breathing with activities but can speak normally in full sentences. Frakn also feels feverish but was unable to measure his temperature.   Symptom details     Cough: Frank coughs a few times an hour and his cough is more bothersome at night. Phlegm comes into his throat when he coughs. He does not believe his cough is caused by post-nasal drip. The color of the phlegm is white and clear.     Headache: He states the headache is moderate (4-6 on a 10 point pain scale).      Frank denies having vomiting, rhinitis, facial pain or pressure, sore throat, teeth pain, diarrhea, ear pain, wheezing, and nasal congestion. He also denies taking antibiotic medication in the past month, having recent facial or sinus surgery in the past 60 days, and having a sinus infection within the past year.   Precipitating events  He has not recently been exposed to someone with influenza. Frank has not been in close contact with any high risk individuals.   Pertinent COVID-19 (Coronavirus) information  Frank does not work or volunteer as healthcare worker or a . In the past 14 days, Frank has not worked or volunteered at a healthcare facility or group living setting.   In the past 14 days, he also has not lived in a congregate " living setting.   Frank has had a close contact with a laboratory-confirmed COVID-19 patient within 14 days of symptom onset. He was exposed at his work. 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    Since December 2019, Frank has not been tested for COVID-19 and has not had upper respiratory infection or influenza-like illness.   Pertinent medical history  Frank needs a return to work/school note.   Weight: 260 lbs   Frank smokes or uses smokeless tobacco.   Weight: 260 lbs    MEDICATIONS: bupropion HCl oral, irbesartan oral, ALLERGIES: NKDA  Clinician Response:  Dear Frank,   Your symptoms show that you may have coronavirus (COVID-19). This illness can cause fever, cough and trouble breathing. Many people get a mild case and get better on their own. Some people can get very sick.  What should I do?  We would like to test you for this virus.   1. Please call [phone] to schedule your visit. Explain that you were referred by [facility] to have a COVID-19 test. Be ready to share your [facility] visit ID number.  * If you need to schedule in [facility] please call [phone] or for [employer] employees please call [phone].  * If you need to schedule in the [place] area please call [phone]. [employer] employees call [phone].  The following will serve as your written order for this COVID Test, ordered by me, for the indication of suspected COVID [Z20.828]: The test will be ordered in [employer], our electronic health record, after you are scheduled. It will show as ordered and authorized by Kai Feng MD.  Order: COVID-19 (Coronavirus) PCR for SYMPTOMATIC testing from [facility].   2. When it's time for your COVID test:  Stay at least 6 feet away from others. (If someone will drive you to your test, stay in the  "backseat, as far away from the  as you can.)   Cover your mouth and nose with a mask, tissue or washcloth.  Go straight to the testing site. Don't make any stops on the way there or back.      3.Starting now: Stay home and away from others (self-isolate) until:   You've had no fever---and no medicine that reduces fever---for one full day (24 hours). And...   Your other symptoms have gotten better. For example, your cough or breathing has improved. And...   At least 10 days have passed since your symptoms started.       During this time, don't leave the house except for testing or medical care.   Stay in your own room, even for meals. Use your own bathroom if you can.   Stay away from others in your home. No hugging, kissing or shaking hands. No visitors.  Don't go to work, school or anywhere else.    Clean \"high touch\" surfaces often (doorknobs, counters, handles, etc.). Use a household cleaning spray or wipes. You'll find a full list of  on the EPA website: www.epa.gov/pesticide-registration/list-n-disinfectants-use-against-sars-cov-2.   Cover your mouth and nose with a mask, tissue or washcloth to avoid spreading germs.  Wash your hands and face often. Use soap and water.  Caregivers in these groups are at risk for severe illness due to COVID-19:  o People 65 years and older  o People who live in a nursing home or long-term care facility  o People with chronic disease (lung, heart, cancer, diabetes, kidney, liver, immunologic)  o People who have a weakened immune system, including those who:   Are in cancer treatment  Take medicine that weakens the immune system, such as corticosteroids  Had a bone marrow or organ transplant  Have an immune deficiency  Have poorly controlled HIV or AIDS  Are obese (body mass index of 40 or higher)  Smoke regularly   o Caregivers should wear gloves while washing dishes, handling laundry and cleaning bedrooms and bathrooms.  o Use caution when washing and drying " laundry: Don't shake dirty laundry, and use the warmest water setting that you can.  o For more tips, go to www.cdc.gov/coronavirus/2019-ncov/downloads/10Things.pdf.    4.Sign up for Hernan Vazquez. We know it's scary to hear that you might have COVID-19. We want to track your symptoms to make sure you're okay over the next 2 weeks. Please look for an email from Hernan Vazquez---this is a free, online program that we'll use to keep in touch. To sign up, follow the link in the email. Learn more at [email or web address]  How can I take care of myself?   Get lots of rest. Drink extra fluids (unless a doctor has told you not to).   Take Tylenol (acetaminophen) for fever or pain. If you have liver or kidney problems, ask your family doctor if it's okay to take Tylenol.   Adults can take either:    650 mg (two 325 mg pills) every 4 to 6 hours, or...   1,000 mg (two 500 mg pills) every 8 hours as needed.    Note: Don't take more than 3,000 mg in one day. Acetaminophen is found in many medicines (both prescribed and over-the-counter medicines). Read all labels to be sure you don't take too much.   For children, check the Tylenol bottle for the right dose. The dose is based on the child's age or weight.    If you have other health problems (like cancer, heart failure, an organ transplant or severe kidney disease): Call your specialty clinic if you don't feel better in the next 2 days.       Know when to call 911. Emergency warning signs include:    Trouble breathing or shortness of breath Pain or pressure in the chest that doesn't go away Feeling confused like you haven't felt before, or not being able to wake up Bluish-colored lips or face.  Where can I get more information?    [facility] -- About COVID-19: [email or web address]   CDC -- What to Do If You're Sick: www.cdc.gov/coronavirus/2019-ncov/about/steps-when-sick.html   CDC -- Ending Home Isolation:  www.cdc.gov/coronavirus/2019-ncov/hcp/disposition-in-home-patients.html   [facility] -- Caring for Someone: www.cdc.gov/coronavirus/2019-ncov/if-you-are-sick/care-for-someone.html   [facility] -- Interim Guidance for Hospital Discharge to Home: www.[facility].mn.us/diseases/coronavirus/hcp/hospdischarge.pdf   [facility] clinical trials (COVID-19 research studies): clinicalaffairs.[facility]-clinical-trials    Below are the COVID-19 hotlines at the [place] Department of Health ([facility]). Interpreters are available.    For health questions: Call 044-572-4649 or 1-668.399.1111 (7 a.m. to 7 p.m.) For questions about schools and childcare: Call 075-399-6340 or 1-631.870.1414 (7 a.m. to 7 p.m.)    Diagnosis: Contact with and (suspected) exposure to other viral communicable diseases  Diagnosis ICD: Z20.828

## 2020-11-19 ENCOUNTER — MYC MEDICAL ADVICE (OUTPATIENT)
Dept: INTERNAL MEDICINE | Facility: CLINIC | Age: 40
End: 2020-11-19

## 2020-11-20 ENCOUNTER — MYC REFILL (OUTPATIENT)
Dept: INTERNAL MEDICINE | Facility: CLINIC | Age: 40
End: 2020-11-20

## 2020-11-20 DIAGNOSIS — F32.0 MILD MAJOR DEPRESSION (H): ICD-10-CM

## 2020-11-24 RX ORDER — BUPROPION HYDROCHLORIDE 150 MG/1
150 TABLET ORAL EVERY MORNING
Qty: 30 TABLET | Refills: 0 | Status: SHIPPED | OUTPATIENT
Start: 2020-11-24 | End: 2020-12-09

## 2020-12-09 ENCOUNTER — VIRTUAL VISIT (OUTPATIENT)
Dept: INTERNAL MEDICINE | Facility: CLINIC | Age: 40
End: 2020-12-09
Payer: COMMERCIAL

## 2020-12-09 DIAGNOSIS — F32.0 MILD MAJOR DEPRESSION (H): ICD-10-CM

## 2020-12-09 DIAGNOSIS — E78.2 MIXED HYPERLIPIDEMIA: ICD-10-CM

## 2020-12-09 DIAGNOSIS — I10 ESSENTIAL HYPERTENSION: Primary | ICD-10-CM

## 2020-12-09 PROCEDURE — 99214 OFFICE O/P EST MOD 30 MIN: CPT | Mod: 95 | Performed by: INTERNAL MEDICINE

## 2020-12-09 RX ORDER — BUPROPION HYDROCHLORIDE 150 MG/1
150 TABLET ORAL EVERY MORNING
Qty: 30 TABLET | Refills: 6 | Status: SHIPPED | OUTPATIENT
Start: 2020-12-09 | End: 2021-04-28

## 2020-12-09 ASSESSMENT — PATIENT HEALTH QUESTIONNAIRE - PHQ9: SUM OF ALL RESPONSES TO PHQ QUESTIONS 1-9: 0

## 2020-12-09 NOTE — PROGRESS NOTES
"Frank Flowers is a 40 year old male who is being evaluated via a billable video visit.      The patient has been notified of following:     \"This video visit will be conducted via a call between you and your physician/provider. We have found that certain health care needs can be provided without the need for an in-person physical exam.  This service lets us provide the care you need with a video conversation.  If a prescription is necessary we can send it directly to your pharmacy.  If lab work is needed we can place an order for that and you can then stop by our lab to have the test done at a later time.    Video visits are billed at different rates depending on your insurance coverage.  Please reach out to your insurance provider with any questions.    If during the course of the call the physician/provider feels a video visit is not appropriate, you will not be charged for this service.\"    Patient has given verbal consent for Video visit? Yes  How would you like to obtain your AVS? MyChart  If you are dropped from the video visit, the video invite should be resent to: Text to cell phone: [phone]  Will anyone else be joining your video visit? No      Subjective     Frank Flowers is a 40 year old male who presents today via video visit for the following health issues:    [facility]       Video Start Time: 10:48 AM    Hyperlipidemia Follow-Up      Are you regularly taking any medication or supplement to lower your cholesterol?   No    Are you having muscle aches or other side effects that you think could be caused by your cholesterol lowering medication?  NA    Hypertension Follow-up      Do you check your blood pressure regularly outside of the clinic? No     Are you following a low salt diet? Yes    Are your blood pressures ever more than 140 on the top number (systolic) OR more   than 90 on the bottom number (diastolic), for example 140/90? No      Depression Followup    How are you doing with your " depression since your last visit? Improved significantly     Are you having other symptoms that might be associated with depression? No    Have you had a significant life event?  No     Are you feeling anxious or having panic attacks?   No    Do you have any concerns with your use of alcohol or other drugs? No      PHQ 11/1/2019 10/20/2020 12/9/2020   PHQ-9 Total Score 5 9 0   Q9: Thoughts of better off dead/self-harm past 2 weeks Not at all Not at all Not at all        Past Medical History:   Diagnosis Date     Hypertension      ROBB (obstructive sleep apnea), severe 2/27/2019       Current Outpatient Medications   Medication Sig Dispense Refill     buPROPion (WELLBUTRIN XL) 150 MG 24 hr tablet Take 1 tablet (150 mg) by mouth every morning 30 tablet 6     irbesartan (AVAPRO) 150 MG tablet Take 1 tablet (150 mg) by mouth At Bedtime 90 tablet 1          Review of Systems   CONSTITUTIONAL: NEGATIVE for fever, chills, change in weight  EYES: NEGATIVE for vision changes or irritation  ENT/MOUTH: NEGATIVE for ear, mouth and throat problems  RESP: NEGATIVE for significant cough or SOB  CV: NEGATIVE for chest pain, palpitations or peripheral edema  NEURO: NEGATIVE for weakness, dizziness or paresthesias  PSYCHIATRIC: NEGATIVE for changes in mood or affect      Objective           Vitals:  No vitals were obtained today due to virtual visit.    Physical Exam     GENERAL: Healthy, alert and no distress  RESP: No audible wheeze, cough, or visible cyanosis.  No visible retractions or increased work of breathing.    PSYCH: Mentation appears normal, affect normal/bright, judgement and insight intact, normal speech and appearance well-groomed.           Assessment & Plan     (I10) Essential hypertension     Plan:on Avapro.Advised to follow low salt diet and exercise        (F32.0) Mild major depression (H)  Comment: significant improvement   Plan:refilled  buPROPion (WELLBUTRIN XL) 150 MG 24 hr tablet as directed.explained clearly  "about the medication,insructions and side effects.     (E78.2) Mixed hyperlipidemia  Plan: not on meds, check lipid panel, on January 4 for physical will check on the labs.         Tobacco Cessation:   reports that he has been smoking cigarettes. He has been smoking about 0.50 packs per day. He has never used smokeless tobacco.  Tobacco Cessation Action Plan: Information offered: Patient not interested at this time  pt tryign on his own in [place]      BMI:   Estimated body mass index is 39.75 kg/m  as calculated from the following:    Height as of 7/14/20: 1.778 m (5' 10\").    Weight as of 7/14/20: 125.6 kg (277 lb).   Weight management plan: Discussed healthy diet and exercise guidelines            Return in about 26 days (around 1/4/2021) for Physical Exam.    Yolanda Parikh MD  [facility]      Video-Visit Details    Type of service:  Video Visit    Video End Time:10:58 AM    Originating Location (pt. Location): Home    Distant Location (provider location):  [facility]     Platform used for Video Visit: Curry        "

## 2021-01-04 ENCOUNTER — OFFICE VISIT (OUTPATIENT)
Dept: INTERNAL MEDICINE | Facility: CLINIC | Age: 41
End: 2021-01-04
Payer: COMMERCIAL

## 2021-01-04 VITALS
BODY MASS INDEX: 38.08 KG/M2 | SYSTOLIC BLOOD PRESSURE: 136 MMHG | TEMPERATURE: 98.2 F | DIASTOLIC BLOOD PRESSURE: 88 MMHG | HEART RATE: 98 BPM | WEIGHT: 266 LBS | HEIGHT: 70 IN | OXYGEN SATURATION: 98 %

## 2021-01-04 DIAGNOSIS — Z00.00 ROUTINE HISTORY AND PHYSICAL EXAMINATION OF ADULT: Primary | ICD-10-CM

## 2021-01-04 LAB — HGB BLD-MCNC: 15.1 G/DL (ref 13.3–17.7)

## 2021-01-04 PROCEDURE — 80061 LIPID PANEL: CPT | Performed by: INTERNAL MEDICINE

## 2021-01-04 PROCEDURE — 36415 COLL VENOUS BLD VENIPUNCTURE: CPT | Performed by: INTERNAL MEDICINE

## 2021-01-04 PROCEDURE — 85018 HEMOGLOBIN: CPT | Performed by: INTERNAL MEDICINE

## 2021-01-04 PROCEDURE — 80053 COMPREHEN METABOLIC PANEL: CPT | Performed by: INTERNAL MEDICINE

## 2021-01-04 PROCEDURE — 99396 PREV VISIT EST AGE 40-64: CPT | Performed by: INTERNAL MEDICINE

## 2021-01-04 ASSESSMENT — MIFFLIN-ST. JEOR: SCORE: 2117.82

## 2021-01-04 ASSESSMENT — ENCOUNTER SYMPTOMS
DIARRHEA: 0
MYALGIAS: 0
NERVOUS/ANXIOUS: 0
ARTHRALGIAS: 0
CHILLS: 0
COUGH: 0
HEADACHES: 0
HEARTBURN: 1
FREQUENCY: 0
EYE PAIN: 0
PALPITATIONS: 1
SHORTNESS OF BREATH: 0
DIZZINESS: 0
HEMATOCHEZIA: 0
HEMATURIA: 0
DYSURIA: 0
NAUSEA: 0
PARESTHESIAS: 0
SORE THROAT: 0
JOINT SWELLING: 0
ABDOMINAL PAIN: 0
CONSTIPATION: 0
FEVER: 0
WEAKNESS: 0

## 2021-01-04 NOTE — PROGRESS NOTES
SUBJECTIVE:   CC: Frank Flowers is an 40 year old male who presents for preventative health visit.       Patient has been advised of split billing requirements and indicates understanding: Yes  Healthy Habits:     Getting at least 3 servings of Calcium per day:  Yes    Bi-annual eye exam:  NO    Dental care twice a year:  NO    Sleep apnea or symptoms of sleep apnea:  Sleep apnea    Diet:  Low salt    Frequency of exercise:  1 day/week    Duration of exercise:  Less than 15 minutes    Taking medications regularly:  Yes    Medication side effects:  None    PHQ-2 Total Score: 0    Additional concerns today:  No        Today's PHQ-2 Score:   PHQ-2 ( 1999 Pfizer) 1/4/2021   Q1: Little interest or pleasure in doing things 0   Q2: Feeling down, depressed or hopeless 0   PHQ-2 Score 0   Q1: Little interest or pleasure in doing things Not at all   Q2: Feeling down, depressed or hopeless Not at all   PHQ-2 Score 0       Abuse: Current or Past(Physical, Sexual or Emotional)- No  Do you feel safe in your environment? Yes        Social History     Tobacco Use     Smoking status: Current Every Day Smoker     Packs/day: 0.50     Types: Cigarettes     Smokeless tobacco: Never Used   Substance Use Topics     Alcohol use: Yes     Comment: 6 drinks per week      If you drink alcohol do you typically have >3 drinks per day or >7 drinks per week? No    Alcohol Use 1/4/2021   Prescreen: >3 drinks/day or >7 drinks/week? Yes   Prescreen: >3 drinks/day or >7 drinks/week? -   AUDIT SCORE  7       Last PSA: No results found for: PSA    Reviewed orders with patient. Reviewed health maintenance and updated orders accordingly - Yes  Lab work is in process    Reviewed and updated as needed this visit by clinical staff  Tobacco  Allergies  Meds   Med Hx  Surg Hx  Fam Hx  Soc Hx        Reviewed and updated as needed this visit by Provider                    Review of Systems   Constitutional: Negative for chills and fever.   HENT:  "Negative for congestion, ear pain, hearing loss and sore throat.    Eyes: Negative for pain and visual disturbance.   Respiratory: Negative for cough and shortness of breath.    Cardiovascular: Negative for chest pain and peripheral edema.   Gastrointestinal: Positive for heartburn. Negative for abdominal pain, constipation, diarrhea, hematochezia and nausea.        Occasional heartburn, relieved with over-the-counter Tums   Genitourinary: Negative for dysuria, frequency, genital sores, hematuria and urgency.   Musculoskeletal: Negative for arthralgias, joint swelling and myalgias.   Skin: Negative for rash.   Neurological: Negative for dizziness, weakness, headaches and paresthesias.   Psychiatric/Behavioral: Negative for mood changes. The patient is not nervous/anxious.        OBJECTIVE:   /88 (BP Location: Right arm, Patient Position: Sitting, Cuff Size: Adult Large)   Pulse 98   Temp 98.2  F (36.8  C) (Oral)   Ht 1.778 m (5' 10\")   Wt 120.7 kg (266 lb)   SpO2 98%   BMI 38.17 kg/m      Physical Exam  GENERAL: healthy, alert and no distress  EYES: Eyes grossly normal to inspection, PERRL and conjunctivae and sclerae normal  NECK: no adenopathy, no asymmetry, masses, or scars and thyroid normal to palpation  RESP: lungs clear to auscultation - no rales, rhonchi or wheezes  CV: regular rate and rhythm, normal S1 S2,   ABDOMEN: soft, nontender, and bowel sounds normal  MS: no gross musculoskeletal defects noted, no edema  NEURO: Normal strength and tone, mentation intact and speech normal  PSYCH: mentation appears normal, affect normal/bright      ASSESSMENT/PLAN:     (Z00.00) Routine history and physical examination of adult  (primary encounter diagnosis)  Plan: Hemoglobin, Comprehensive metabolic panel,         Lipid panel reflex to direct LDL Fasting            Patient has been advised of split billing requirements and indicates understanding: Yes  COUNSELING:   Reviewed preventive health counseling, " "as reflected in patient instructions       Regular exercise       Healthy diet/nutrition       Immunizations    Declined: Influenza         Estimated body mass index is 38.17 kg/m  as calculated from the following:    Height as of this encounter: 1.778 m (5' 10\").    Weight as of this encounter: 120.7 kg (266 lb).     Weight management plan: Discussed healthy diet and exercise guidelines    He reports that he has been smoking cigarettes. He has been smoking about 0.50 packs per day. He has never used smokeless tobacco.  Tobacco Cessation Action Plan:   Information offered: Patient not interested at this time  Patient states he will try on his own      Counseling Resources:  ATP IV Guidelines  Pooled Cohorts Equation Calculator  FRAX Risk Assessment  ICSI Preventive Guidelines  Dietary Guidelines for Americans, 2010  USDA's MyPlate  ASA Prophylaxis  Lung CA Screening    Yolanda Parikh MD  [facility]  "

## 2021-01-05 LAB
ALBUMIN SERPL-MCNC: 3.7 G/DL (ref 3.4–5)
ALP SERPL-CCNC: 85 U/L (ref 40–150)
ALT SERPL W P-5'-P-CCNC: 57 U/L (ref 0–70)
ANION GAP SERPL CALCULATED.3IONS-SCNC: 5 MMOL/L (ref 3–14)
AST SERPL W P-5'-P-CCNC: 36 U/L (ref 0–45)
BILIRUB SERPL-MCNC: 0.3 MG/DL (ref 0.2–1.3)
BUN SERPL-MCNC: 21 MG/DL (ref 7–30)
CALCIUM SERPL-MCNC: 8.6 MG/DL (ref 8.5–10.1)
CHLORIDE SERPL-SCNC: 107 MMOL/L (ref 94–109)
CHOLEST SERPL-MCNC: 208 MG/DL
CO2 SERPL-SCNC: 27 MMOL/L (ref 20–32)
CREAT SERPL-MCNC: 1.14 MG/DL (ref 0.66–1.25)
GFR SERPL CREATININE-BSD FRML MDRD: 79 ML/MIN/{1.73_M2}
GLUCOSE SERPL-MCNC: 102 MG/DL (ref 70–99)
HDLC SERPL-MCNC: 47 MG/DL
LDLC SERPL CALC-MCNC: 115 MG/DL
NONHDLC SERPL-MCNC: 161 MG/DL
POTASSIUM SERPL-SCNC: 4.1 MMOL/L (ref 3.4–5.3)
PROT SERPL-MCNC: 7.7 G/DL (ref 6.8–8.8)
SODIUM SERPL-SCNC: 139 MMOL/L (ref 133–144)
TRIGL SERPL-MCNC: 229 MG/DL

## 2021-01-21 ENCOUNTER — OFFICE VISIT (OUTPATIENT)
Dept: INTERNAL MEDICINE | Facility: CLINIC | Age: 41
End: 2021-01-21
Payer: COMMERCIAL

## 2021-01-21 VITALS
DIASTOLIC BLOOD PRESSURE: 84 MMHG | OXYGEN SATURATION: 98 % | RESPIRATION RATE: 22 BRPM | BODY MASS INDEX: 38.65 KG/M2 | TEMPERATURE: 97.9 F | WEIGHT: 270 LBS | SYSTOLIC BLOOD PRESSURE: 132 MMHG | HEART RATE: 80 BPM | HEIGHT: 70 IN

## 2021-01-21 DIAGNOSIS — R10.32 LLQ ABDOMINAL PAIN: Primary | ICD-10-CM

## 2021-01-21 DIAGNOSIS — R19.5 LOOSE STOOLS: ICD-10-CM

## 2021-01-21 LAB
ALBUMIN SERPL-MCNC: 3.6 G/DL (ref 3.4–5)
ALP SERPL-CCNC: 82 U/L (ref 40–150)
ALT SERPL W P-5'-P-CCNC: 54 U/L (ref 0–70)
ANION GAP SERPL CALCULATED.3IONS-SCNC: 1 MMOL/L (ref 3–14)
AST SERPL W P-5'-P-CCNC: 31 U/L (ref 0–45)
BASOPHILS # BLD AUTO: 0.1 10E9/L (ref 0–0.2)
BASOPHILS NFR BLD AUTO: 0.6 %
BILIRUB SERPL-MCNC: 0.4 MG/DL (ref 0.2–1.3)
BUN SERPL-MCNC: 18 MG/DL (ref 7–30)
CALCIUM SERPL-MCNC: 9.1 MG/DL (ref 8.5–10.1)
CHLORIDE SERPL-SCNC: 105 MMOL/L (ref 94–109)
CO2 SERPL-SCNC: 30 MMOL/L (ref 20–32)
CREAT SERPL-MCNC: 1.15 MG/DL (ref 0.66–1.25)
DIFFERENTIAL METHOD BLD: NORMAL
EOSINOPHIL # BLD AUTO: 0.4 10E9/L (ref 0–0.7)
EOSINOPHIL NFR BLD AUTO: 4.1 %
ERYTHROCYTE [DISTWIDTH] IN BLOOD BY AUTOMATED COUNT: 14.2 % (ref 10–15)
GFR SERPL CREATININE-BSD FRML MDRD: 78 ML/MIN/{1.73_M2}
GLUCOSE SERPL-MCNC: 97 MG/DL (ref 70–99)
HCT VFR BLD AUTO: 47.5 % (ref 40–53)
HGB BLD-MCNC: 15.2 G/DL (ref 13.3–17.7)
LYMPHOCYTES # BLD AUTO: 2 10E9/L (ref 0.8–5.3)
LYMPHOCYTES NFR BLD AUTO: 23.2 %
MCH RBC QN AUTO: 27.8 PG (ref 26.5–33)
MCHC RBC AUTO-ENTMCNC: 32 G/DL (ref 31.5–36.5)
MCV RBC AUTO: 87 FL (ref 78–100)
MONOCYTES # BLD AUTO: 0.9 10E9/L (ref 0–1.3)
MONOCYTES NFR BLD AUTO: 10.8 %
NEUTROPHILS # BLD AUTO: 5.2 10E9/L (ref 1.6–8.3)
NEUTROPHILS NFR BLD AUTO: 61.3 %
PLATELET # BLD AUTO: 210 10E9/L (ref 150–450)
POTASSIUM SERPL-SCNC: 4.5 MMOL/L (ref 3.4–5.3)
PROT SERPL-MCNC: 7.9 G/DL (ref 6.8–8.8)
RBC # BLD AUTO: 5.46 10E12/L (ref 4.4–5.9)
SODIUM SERPL-SCNC: 136 MMOL/L (ref 133–144)
TSH SERPL DL<=0.005 MIU/L-ACNC: 1.65 MU/L (ref 0.4–4)
WBC # BLD AUTO: 8.5 10E9/L (ref 4–11)

## 2021-01-21 PROCEDURE — 36415 COLL VENOUS BLD VENIPUNCTURE: CPT | Performed by: NURSE PRACTITIONER

## 2021-01-21 PROCEDURE — 80050 GENERAL HEALTH PANEL: CPT | Performed by: NURSE PRACTITIONER

## 2021-01-21 PROCEDURE — 99215 OFFICE O/P EST HI 40 MIN: CPT | Performed by: NURSE PRACTITIONER

## 2021-01-21 ASSESSMENT — MIFFLIN-ST. JEOR: SCORE: 2135.96

## 2021-01-21 NOTE — NURSING NOTE
"Chief Complaint   Patient presents with     Abdominal Pain     Pt c/o lower abdominal pain and also having frequent stools  onsety x 1 week     initial /84   Pulse 80   Temp 97.9  F (36.6  C) (Oral)   Resp 22   Ht 1.778 m (5' 10\")   Wt 122.5 kg (270 lb)   SpO2 98%   BMI 38.74 kg/m   Estimated body mass index is 38.74 kg/m  as calculated from the following:    Height as of this encounter: 1.778 m (5' 10\").    Weight as of this encounter: 122.5 kg (270 lb)..  bp completed using cuff size large  KALINA MCELROY LPN  "

## 2021-01-21 NOTE — PROGRESS NOTES
Assessment & Plan     [facility] abdominal pain  Rule out diverticulitis  Discussed low fiber diet now and increased  fiber after recovered if diverticulitis    No seeds, popcorn, nuts, corn    Of note he said before this started he did eat popcorn and had not had prior to that for a long time   - CT Abdomen Pelvis w Contrast; Future  - CBC with platelets and differential  - Comprehensive metabolic panel (BMP + Alb, Alk Phos, ALT, AST, Total. Bili, TP)  - TSH with free T4 reflex    Loose stools    - CT Abdomen Pelvis w Contrast; Future  - CBC with platelets and differential  - Comprehensive metabolic panel (BMP + Alb, Alk Phos, ALT, AST, Total. Bili, TP)  - TSH with free T4 reflex  - Enteric Bacteria and Virus Panel by [facility]OCK[facility] Stool; Future              45 minutes spent on the date of the encounter doing chart review, history and exam, documentation and further activities as noted above               No follow-ups on file.    WILLY Barrera CNP  [facility] FRAN Bullock is a 40 year old who presents to clinic today for the following health issues     HPI   Chief Complaint   Patient presents with     Abdominal Pain     Pt c/o lower abdominal pain and also having frequent stools  onsety x 1 week    Diarrhea at first and now more firm and less frequent stool in past couple of days   When he wakes it he has to go  And may go 2-3 times   Was 5-6 times proceeded by pain at first   He still has pain in mid lower abdomen prior to stooling and gone after   No blood in stool    Not related to eating   Does not wake at night - but goes when he wakes up     Able to eat and drinks a lot of water     Was constipated before start of this   Taking miralax as needed and working   No urinary symptoms    No change in diet, medication, new exposure to animal   May have eaten food out, but does not remember                      Review of Systems   Constitutional, HEENT, cardiovascular, pulmonary, GI,  ", musculoskeletal, neuro, skin, endocrine and psych systems are negative, except as otherwise noted.      Objective    /84   Pulse 80   Temp 97.9  F (36.6  C) (Oral)   Resp 22   Ht 1.778 m (5' 10\")   Wt 122.5 kg (270 lb)   SpO2 98%   BMI 38.74 kg/m    Body mass index is 38.74 kg/m .  Physical Exam   GENERAL:  alert and no distress  RESP: lungs clear to auscultation - no rales, rhonchi or wheezes  CV: regular rate and rhythm  ABDOMEN: soft, tender LLQ - no rebound - rest of abdomen without pain , no hepatosplenomegaly, no masses and bowel sounds normal  MS: no gross musculoskeletal defects noted, no edema  NEURO: Normal strength and tone, mentation intact and speech normal  PSYCH: mentation appears normal, affect normal/bright    Lab   CT scan             "

## 2021-01-25 ENCOUNTER — HOSPITAL ENCOUNTER (OUTPATIENT)
Dept: CT IMAGING | Facility: CLINIC | Age: 41
Discharge: HOME | End: 2021-01-25
Attending: NURSE PRACTITIONER | Admitting: NURSE PRACTITIONER
Payer: COMMERCIAL

## 2021-01-25 DIAGNOSIS — R19.5 LOOSE STOOLS: ICD-10-CM

## 2021-01-25 DIAGNOSIS — R10.32 LLQ ABDOMINAL PAIN: ICD-10-CM

## 2021-01-25 PROCEDURE — 74177 CT ABD & PELVIS W/CONTRAST: CPT

## 2021-01-25 PROCEDURE — 250N000009 HC RX 250: Performed by: RADIOLOGY

## 2021-01-25 PROCEDURE — 250N000011 HC RX IP 250 OP 636: Performed by: RADIOLOGY

## 2021-01-25 RX ORDER — IOPAMIDOL 755 MG/ML
500 INJECTION, SOLUTION INTRAVASCULAR ONCE
Status: COMPLETED | OUTPATIENT
Start: 2021-01-25 | End: 2021-01-25

## 2021-01-25 RX ADMIN — SODIUM CHLORIDE 55 ML: 9 INJECTION, SOLUTION INTRAVENOUS at 14:17

## 2021-01-25 RX ADMIN — IOPAMIDOL 100 ML: 755 INJECTION, SOLUTION INTRAVENOUS at 14:17

## 2021-01-26 DIAGNOSIS — K40.20 BILATERAL INGUINAL HERNIA WITHOUT OBSTRUCTION OR GANGRENE, RECURRENCE NOT SPECIFIED: ICD-10-CM

## 2021-01-26 DIAGNOSIS — N50.9 SCROTAL LESION: Primary | ICD-10-CM

## 2021-02-04 ENCOUNTER — OFFICE VISIT (OUTPATIENT)
Dept: SURGERY | Facility: CLINIC | Age: 41
End: 2021-02-04
Payer: COMMERCIAL

## 2021-02-04 VITALS
DIASTOLIC BLOOD PRESSURE: 82 MMHG | RESPIRATION RATE: 16 BRPM | OXYGEN SATURATION: 98 % | HEIGHT: 70 IN | SYSTOLIC BLOOD PRESSURE: 130 MMHG | WEIGHT: 270 LBS | BODY MASS INDEX: 38.65 KG/M2 | HEART RATE: 78 BPM

## 2021-02-04 DIAGNOSIS — K40.20 NON-RECURRENT BILATERAL INGUINAL HERNIA WITHOUT OBSTRUCTION OR GANGRENE: Primary | ICD-10-CM

## 2021-02-04 PROCEDURE — 99204 OFFICE O/P NEW MOD 45 MIN: CPT | Performed by: SURGERY

## 2021-02-04 ASSESSMENT — MIFFLIN-ST. JEOR: SCORE: 2135.96

## 2021-02-04 NOTE — PROGRESS NOTES
[facility]  New Patient Office Visit    Assessment:   Frank Flowers is a 40 year old male with Primary, reducible bilateral inguinal hernias. These hernias are asymptomatic for him.     Plan:    We have had a detailed discussion regarding the nature of inguinal hernias, and that watchful waiting for small asymptomatic hernias is acceptable, but that in general they do tend to enlarge or become symptomatic over time.  At this time, he has no symptoms and is not interested in repair.  Surgery, indications, alternatives, risks, benefits, incisions, scarring, anesthesia, recovery, mesh, infection, bleeding, numbness, nerve damage and chronic pain, testicular loss, hernia recurrence, lifting and activity limitations after surgery.  All questions have been answered to the best of my ability. He will reach out to us regarding surgery scheduling if he chooses to proceed. I would offer him a robotic-assisted bilateral inguinal hernia repair    We have discussed observation, reduction techniques and importance, incarceration and strangulation signs, symptoms and importance as well as need to seek emergency treatment.  We discussed strategies for keeping hernias from enlarging, including weight loss, smoking cessation to prevent chronic cough, treatment of constipation, and safe lifting practices.     Recommended time off work postop:  1-2 wks  Recommended time off lifting 20 lb:   4 wks  He has been given literature to review.     HPI:  Frank Flowers is a 40 year old male who presents for evaluation of bilateral inguinal hernias that were identified on abdominal CT performed for LLQ abdominal pain. His pain was likely due to constipation and has been improving with more regular bowel movements. He has not noticed any bulging in the groin area. He does not have pain. No previous pelvic surgery. 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 He is a current every  "day smoker.     Past Medical History:  Hypertension  Sleep apnea  Obesity, BMI 38    Medications:  Current Outpatient Medications   Medication     buPROPion (WELLBUTRIN XL) 150 MG 24 hr tablet     irbesartan (AVAPRO) 150 MG tablet     Past Surgical History:  Past Surgical History:   Procedure Laterality Date     DENTAL SURGERY      abscessed tooth removed under anesthesia     OPEN REDUCTION INTERNAL FIXATION RODDING INTRAMEDULLARY TIBIA Left 1/14/2018    Procedure: OPEN REDUCTION INTERNAL FIXATION RODDING INTRAMEDULLARY TIBIA;  Intramedullary nailing, left tibial shaft fracture;  Surgeon: Jian Burgos MD;  Location:  OR      Social History:  Social History     Tobacco Use     Smoking status: Current Every Day Smoker     Packs/day: 0.50     Types: Cigarettes     Smokeless tobacco: Never Used   Substance Use Topics     Alcohol use: Yes     Comment: 6 drinks per week      Drug use: No       Lives in [place]. Occupation:     Family History:  Family History   Problem Relation Age of Onset     Depression Mother      Anxiety Disorder Mother      Unknown/Adopted Father      Unknown/Adopted Maternal Grandmother      Unknown/Adopted Maternal Grandfather      Unknown/Adopted Paternal Grandmother      Unknown/Adopted Paternal Grandfather      Unknown/Adopted Brother      Anxiety Disorder Sister      Sleep Apnea Sister      No Family history of bleeding or clotting disorders or reactions to anesthesia    ROS:  The 10 point review of systems is negative other than noted in the HPI and above.    PE:    Vitals: /82   Pulse 78   Resp 16   Ht 1.778 m (5' 10\")   Wt 122.5 kg (270 lb)   SpO2 98%   BMI 38.74 kg/m    BMI= Body mass index is 38.74 kg/m .  General- Well-developed, well-nourished, patient able to get up on table without difficulty.  HEENT- Mucous membranes moist.  Sclera are nonicteric.  Lymph -  No inguinal lymphadenopathy or masses   Respiratory- regular and non " labored  CV - regular rate and rhythm  Abdomen- abdomen is soft without significant tenderness, masses, organomegaly or guarding, Tiny (~1 cm) umbilical hernia  Hernia- Upon standing there is no obvious bulging in either groin. Exam is limited by body habitus, but small, bilateral inguinal hernias can be palpated. Testes are descended bilaterally and normal  Extremities- without edema  Psych- mood and affect are appropriate  Neurologic- alert, speech is clear, moves all extremities with good strength  Integument- without lesions, rashes, or jaundice    This note may have been created using voice recognition software. Undetected word substitutions or other errors may have occurred.     Time spent with the patient with greater that 50% of the time in discussion was 30 minutes.     Farheen Grigsby MD  02/04/21 3:06 PM     Please route or send letter to:  Referring Provider

## 2021-02-16 ENCOUNTER — VIRTUAL VISIT (OUTPATIENT)
Dept: SLEEP MEDICINE | Facility: CLINIC | Age: 41
End: 2021-02-16
Payer: COMMERCIAL

## 2021-02-16 DIAGNOSIS — G47.33 OSA (OBSTRUCTIVE SLEEP APNEA): Primary | ICD-10-CM

## 2021-02-16 PROCEDURE — 99213 OFFICE O/P EST LOW 20 MIN: CPT | Mod: 95 | Performed by: INTERNAL MEDICINE

## 2021-02-16 NOTE — PROGRESS NOTES
Kobe is a 40 year old who is being evaluated via a billable telephone visit.      What phone number would you like to be contacted at? [phone]  How would you like to obtain your AVS? Leon Bishop MA

## 2021-02-16 NOTE — PROGRESS NOTES
[facility]   Outpatient Sleep Medicine Follow-up Visit  February 16, 2021    Name: Frank Flowers MRN# [id number]   Age: 40 year old YOB: 1980     Date of Consultation: February 16, 2021  Consultation is requested by: No referring provider defined for this encounter.  Primary care provider: Yolanda Parikh           Assessment and Plan:     Sleep Diagnoses:    Severe obstructive sleep apnea AHI 58 2019 currently on auto titration CPAP 5-15    Comorbid conditions:    Hypertension    Mood disturbance    Obesity BMI 38        Summary Counseling: Continue to use the device and order new mask and supplies.  You should use humidification particularly in the winter for comfort.  If you have recurrence of snoring or disrupted sleep before your next visit please contact us.             Problem list:     Patient Active Problem List   Diagnosis     Essential hypertension     Mixed hyperlipidemia     Obesity (BMI 35.0-39.9) with comorbidity (H)     Severe obstructive sleep apnea     Tobacco abuse disorder     Mild major depression (H)             History of Present Illness:     Frank Flowers is a 40 year old male who has had dramatic improvement in sleep disruption in sleep quality with use of CPAP for severe obstructive sleep apnea associated with hypoxemia.  He is using his device over 8 hours nightly with adequate adherence and disease control.    PREVIOUS HOME SLEEP TEST:  Study Date: 2/13/2019  Referring Provider: Yolanda Parikh;   Ordering Provider: TESSIE ARGUELLES MD     Indications for Home Study: Frank Flowers is a 38 year old male with a history of hypertension, lipidemia, severe obesity who presents with symptoms suggestive of obstructive sleep apnea with extremely loud snoring, interrupted breathing with apneas and profound daytime sleepiness.     Estimated body mass index is 37.59 kg/m  as calculated from the following:    Height as of 2/1/19: 1.778  "cynthia (5' 10\").    Weight as of 19: 118.8 kg (262 lb).  Total score - Arroyo: 18 (2019  9:00 AM)  STOP-BAN/8     Data: A full night home sleep study was performed recording the standard physiologic parameters including body position, movement, sound, nasal pressure, thermal oral airflow, chest and abdominal movements with respiratory inductance plethysmography, and oxygen saturation by pulse oximetry. Pulse rate was estimated by oximetry recording. This study was considered adequate based on > 4 hours of quality oximetry and respiratory recording. As specified by the AASM Manual for the Scoring of Sleep and Associated events, version 2.3, Rule VIII.D 1B, 4% oxygen desaturation scoring for hypopneas is used as a standard of care on all home sleep apnea testing.     Analysis Time:  452 minutes     Respiration:   Sleep Associated Hypoxemia: sustained hypoxemia was present. Baseline oxygen saturation was 93%.  Time with saturation less than or equal to 88% was 6.3 minutes. The lowest oxygen saturation was 85%.   Snoring: Snoring was present up to  db.  Respiratory events: The home study revealed a presence of 108 obstructive apneas and 4 mixed and central apneas. There were 394 hypopneas resulting in a combined apnea/hypopnea index [AHI] of 58.4 events per hour.  AHI was 96.6 per hour supine, - per hour prone, 46.3 per hour on left side, and 65.6 per hour on right side.   Pattern: Excluding events noted above, respiratory rate and pattern was Normal.     Position: Percent of time spent: supine - 13%, prone - 0%, on left - 58%, on right - 29%.     Heart Rate: By pulse oximetry normal rate was noted.      Assessment:   Severe obstructive sleep apnea with hypoxemia.  Hypoventilation is not excluded.    CURRENT THERAPY  Subjective:  Marked impr        Objective:  CPAP Compliance Targets:   >70% days > 4 hours AHI < 5   30 days ending 2021   ResMed   Auto-PAP 5.0 - 15.0 cmH2O 30 day usage data:  "   96% of days with > 4 hours of use. 0/30 days with no use.   Average use 517 minutes per day.   95%ile Leak 27.91 L/min.   CPAP 95% pressure 14.8 cm.   AHI 0.95 events per hour.                    Medications:     Current Outpatient Medications   Medication Sig     buPROPion (WELLBUTRIN XL) 150 MG 24 hr tablet Take 1 tablet (150 mg) by mouth every morning     irbesartan (AVAPRO) 150 MG tablet Take 1 tablet (150 mg) by mouth At Bedtime     No current facility-administered medications for this visit.         Allergies   Allergen Reactions     Lisinopril      cough            Problem List:     Patient Active Problem List   Diagnosis     Essential hypertension     Mixed hyperlipidemia     Obesity (BMI 35.0-39.9) with comorbidity (H)     Severe obstructive sleep apnea     Tobacco abuse disorder     Mild major depression (H)            Past Medical History:     Does not need 02 supplement at night   Past Medical History:   Diagnosis Date     Hypertension      ROBB (obstructive sleep apnea), severe 2/27/2019             Past Surgical History:    No h/o  upper airway surgery  Past Surgical History:   Procedure Laterality Date     DENTAL SURGERY      abscessed tooth removed under anesthesia     OPEN REDUCTION INTERNAL FIXATION RODDING INTRAMEDULLARY TIBIA Left 1/14/2018    Procedure: OPEN REDUCTION INTERNAL FIXATION RODDING INTRAMEDULLARY TIBIA;  Intramedullary nailing, left tibial shaft fracture;  Surgeon: Jian Burgos MD;  Location:  OR              Physical Examination:     Mandibular profile  Reported vitals:  There were no vitals taken for this visit.   PSYCH: Mentation appears normal, affect normal/bright, judgement and insight intact, normal speech and appearance well-groomed.        Copy to: Yolanda Parikh MD 2/16/2021       Total time spent with patient: 15 min >50% counseling and 5 minutes documenting and reviewing records

## 2021-04-28 ENCOUNTER — VIRTUAL VISIT (OUTPATIENT)
Dept: INTERNAL MEDICINE | Facility: CLINIC | Age: 41
End: 2021-04-28
Payer: COMMERCIAL

## 2021-04-28 DIAGNOSIS — I10 ESSENTIAL HYPERTENSION: ICD-10-CM

## 2021-04-28 DIAGNOSIS — Z72.0 TOBACCO ABUSE DISORDER: Primary | ICD-10-CM

## 2021-04-28 DIAGNOSIS — F32.0 MILD MAJOR DEPRESSION (H): ICD-10-CM

## 2021-04-28 PROCEDURE — 99214 OFFICE O/P EST MOD 30 MIN: CPT | Mod: GT | Performed by: INTERNAL MEDICINE

## 2021-04-28 RX ORDER — BUPROPION HYDROCHLORIDE 150 MG/1
150 TABLET ORAL EVERY MORNING
Qty: 90 TABLET | Refills: 1 | Status: SHIPPED | OUTPATIENT
Start: 2021-04-28 | End: 2021-08-17

## 2021-04-28 RX ORDER — IRBESARTAN 150 MG/1
150 TABLET ORAL AT BEDTIME
Qty: 90 TABLET | Refills: 1 | Status: SHIPPED | OUTPATIENT
Start: 2021-04-28 | End: 2021-08-17

## 2021-04-28 ASSESSMENT — PATIENT HEALTH QUESTIONNAIRE - PHQ9: SUM OF ALL RESPONSES TO PHQ QUESTIONS 1-9: 1

## 2021-04-28 NOTE — PROGRESS NOTES
Kobe is a 40 year old who is being evaluated via a billable video visit.      How would you like to obtain your AVS? MyChart  If the video visit is dropped, the invitation should be resent by: Text to cell phone: [phone]  Will anyone else be joining your video visit? No      Video Start Time: 11:49 AM    Assessment & Plan     (F32.0) Mild major depression (H)  Comment: well controled   Plan: buPROPion (WELLBUTRIN XL) 150 MG 24 hr tablet refilled.explained clearly about the medication,insructions and side effects.  F/u in 6 months            (I10) Essential hypertension  Comment: BP stable at home   Plan: irbesartan (AVAPRO) 150 MG tablet refilled as directed.explained clearly about the medication,insructions and side effects.  Advised to follow low salt diet and exercise and f/u in 6 mths.             (Z72.0) Tobacco abuse disorder   Plan:discussed smoking cessation       Prescription drug management       Return in about 6 months (around 10/28/2021).    Yolanda Parikh MD  [facility]   Kobe is a 40 year old who presents for the following health issues     HPI     Hypertension Follow-up      Do you check your blood pressure regularly outside of the clinic? Yes BP - 130/82    Are you following a low salt diet? Yes    Are your blood pressures ever more than 140 on the top number (systolic) OR more   than 90 on the bottom number (diastolic), for example 140/90? No    Hyperlipidemia Follow-Up      Are you regularly taking any medication or supplement to lower your cholesterol?   No    Are you having muscle aches or other side effects that you think could be caused by your cholesterol lowering medication?  NA    Depression Followup    How are you doing with your depression since your last visit? No change    Are you having other symptoms that might be associated with depression? No    Have you had a significant life event?  No     Are you feeling anxious or having  panic attacks?   No    Do you have any concerns with your use of alcohol or other drugs? No  PHQ 10/20/2020 12/9/2020 4/28/2021   PHQ-9 Total Score 9 0 1   Q9: Thoughts of better off dead/self-harm past 2 weeks Not at all Not at all Not at all      56}      How many servings of fruits and vegetables do you eat daily?  0-1    On average, how many sweetened beverages do you drink each day (Examples: soda, juice, sweet tea, etc.  Do NOT count diet or artificially sweetened beverages)?   2    How many days per week do you exercise enough to make your heart beat faster? 3 or less    How many minutes a day do you exercise enough to make your heart beat faster? 9 or less    How many days per week do you miss taking your medication? 0      Past Medical History:   Diagnosis Date     Hypertension      ROBB (obstructive sleep apnea), severe 2/27/2019       Current Outpatient Medications   Medication Sig Dispense Refill     buPROPion (WELLBUTRIN XL) 150 MG 24 hr tablet Take 1 tablet (150 mg) by mouth every morning 90 tablet 1     irbesartan (AVAPRO) 150 MG tablet Take 1 tablet (150 mg) by mouth At Bedtime 90 tablet 1       Review of Systems   CONSTITUTIONAL: NEGATIVE for fever, chills, change in weight  RESP: NEGATIVE for significant cough or SOB  CV: NEGATIVE for chest pain, palpitations or peripheral edema  MUSCULOSKELETAL: NEGATIVE for significant arthralgias or myalgia  NEURO: NEGATIVE for weakness, dizziness or paresthesias  PSYCHIATRIC: NEGATIVE for changes in mood or affect      Objective           Vitals:  No vitals were obtained today due to virtual visit.    Physical Exam   GENERAL: Healthy, alert and no distress  EYES: Eyes grossly normal to inspection.  No discharge or erythema, or obvious scleral/conjunctival abnormalities.  RESP: No audible wheeze, cough, or visible cyanosis.  No visible retractions or increased work of breathing.    PSYCH: Mentation appears normal, affect normal/bright, judgement and insight  intact, normal speech and appearance well-groomed.       Video-Visit Details    Type of service:  Video Visit    Video End Time:11:55 AM    Originating Location (pt. Location): Home    Distant Location (provider location):  [facility]     Platform used for Video Visit: Curry

## 2021-08-17 ENCOUNTER — OFFICE VISIT (OUTPATIENT)
Dept: INTERNAL MEDICINE | Facility: CLINIC | Age: 41
End: 2021-08-17
Payer: COMMERCIAL

## 2021-08-17 VITALS
BODY MASS INDEX: 39.51 KG/M2 | WEIGHT: 276 LBS | TEMPERATURE: 98.7 F | HEART RATE: 92 BPM | DIASTOLIC BLOOD PRESSURE: 88 MMHG | OXYGEN SATURATION: 96 % | SYSTOLIC BLOOD PRESSURE: 122 MMHG | HEIGHT: 70 IN | RESPIRATION RATE: 16 BRPM

## 2021-08-17 DIAGNOSIS — E78.2 MIXED HYPERLIPIDEMIA: ICD-10-CM

## 2021-08-17 DIAGNOSIS — E66.01 MORBID OBESITY (H): ICD-10-CM

## 2021-08-17 DIAGNOSIS — I10 ESSENTIAL HYPERTENSION: Primary | ICD-10-CM

## 2021-08-17 DIAGNOSIS — F32.0 MILD MAJOR DEPRESSION (H): ICD-10-CM

## 2021-08-17 DIAGNOSIS — Z72.0 TOBACCO ABUSE DISORDER: ICD-10-CM

## 2021-08-17 PROCEDURE — 99214 OFFICE O/P EST MOD 30 MIN: CPT | Performed by: INTERNAL MEDICINE

## 2021-08-17 RX ORDER — BUPROPION HYDROCHLORIDE 150 MG/1
150 TABLET ORAL EVERY MORNING
Qty: 90 TABLET | Refills: 1 | Status: SHIPPED | OUTPATIENT
Start: 2021-08-17 | End: 2022-02-28

## 2021-08-17 RX ORDER — IRBESARTAN 150 MG/1
150 TABLET ORAL AT BEDTIME
Qty: 90 TABLET | Refills: 1 | Status: SHIPPED | OUTPATIENT
Start: 2021-08-17 | End: 2022-02-28

## 2021-08-17 ASSESSMENT — ANXIETY QUESTIONNAIRES
GAD7 TOTAL SCORE: 1
6. BECOMING EASILY ANNOYED OR IRRITABLE: NOT AT ALL
5. BEING SO RESTLESS THAT IT IS HARD TO SIT STILL: NOT AT ALL
IF YOU CHECKED OFF ANY PROBLEMS ON THIS QUESTIONNAIRE, HOW DIFFICULT HAVE THESE PROBLEMS MADE IT FOR YOU TO DO YOUR WORK, TAKE CARE OF THINGS AT HOME, OR GET ALONG WITH OTHER PEOPLE: NOT DIFFICULT AT ALL
7. FEELING AFRAID AS IF SOMETHING AWFUL MIGHT HAPPEN: NOT AT ALL
1. FEELING NERVOUS, ANXIOUS, OR ON EDGE: NOT AT ALL
2. NOT BEING ABLE TO STOP OR CONTROL WORRYING: NOT AT ALL
3. WORRYING TOO MUCH ABOUT DIFFERENT THINGS: SEVERAL DAYS

## 2021-08-17 ASSESSMENT — MIFFLIN-ST. JEOR: SCORE: 2150.24

## 2021-08-17 ASSESSMENT — PATIENT HEALTH QUESTIONNAIRE - PHQ9
5. POOR APPETITE OR OVEREATING: NOT AT ALL
SUM OF ALL RESPONSES TO PHQ QUESTIONS 1-9: 1

## 2021-08-17 NOTE — PROGRESS NOTES
"    Assessment & Plan     (I10) Essential hypertension  (primary encounter diagnosis)  Plan: Diastolic blood pressure slightly high normal,  refilled irbesartan (AVAPRO) 150  MG tablet as directed.explained clearly about the medication,insructions and side effects. Advised to follow low salt diet and exercise and f/u in 3 mths.   Check  basic metabolic panel,            (E78.2) Mixed hyperlipidemia  Plan: Not on medications at this time, continue to follow low-fat diet regular exercise        (Z72.0) Tobacco abuse disorder  Plan: Discussed smoking cessation with the patient, patient states he will would like to try quit on his own and states that he has cut down cigarettes..    (F32.0) Mild major depression (H)  Comment: Stable  Plan: buPROPion (WELLBUTRIN XL) 150 MG 24 hr tablet refilled as directed.explained clearly about the medication,insructions and side effects.          (E66.01) Morbid obesity (H)  Plan:  -Discussed in detail about Diet,calorie intake,and importance of regular exercise,        Review of the result(s) of each unique test - BMP  Prescription drug management        BMI:   Estimated body mass index is 40.17 kg/m  as calculated from the following:    Height as of this encounter: 1.765 m (5' 9.5\").    Weight as of this encounter: 125.2 kg (276 lb).   Weight management plan: Discussed healthy diet and exercise guidelines       Return in about 3 months (around 11/17/2021) for BP Recheck.    Yolanda Parikh MD  [facility] FRAN Bullock is a 41 year old who presents for the following health issues     HPI     Hypertension Follow-up      Do you check your blood pressure regularly outside of the clinic? No , on Avapro 150 mg daily, tolerating well    Are you following a low salt diet? Yes    Are your blood pressures ever more than 140 on the top number (systolic) OR more   than 90 on the bottom number (diastolic), for example 140/90? No    Hyperlipidemia " Follow-Up      Are you regularly taking any medication or supplement to lower your cholesterol?   No    Are you having muscle aches or other side effects that you think could be caused by your cholesterol lowering medication?  NA      Depression Followup    How are you doing with your depression since your last visit? No change, on Wellbutrin 150 mg daily with good symptom relief    Are you having other symptoms that might be associated with depression? No    Have you had a significant life event?  No     Are you feeling anxious or having panic attacks?   No    Do you have any concerns with your use of alcohol or other drugs? No    Social History     Tobacco Use     Smoking status: Current Every Day Smoker     Packs/day: 0.50     Years: 23.00     Pack years: 11.50     Types: Cigarettes     Smokeless tobacco: Never Used     Tobacco comment: smoking since age 18   Vaping Use     Vaping Use: Some days     Substances: Nicotine     Devices: Disposable   Substance Use Topics     Alcohol use: Yes     Comment: 6 drinks per week      Drug use: No     PHQ 12/9/2020 4/28/2021 8/17/2021   PHQ-9 Total Score 0 1 1   Q9: Thoughts of better off dead/self-harm past 2 weeks Not at all Not at all Not at all     0956}    How many servings of fruits and vegetables do you eat daily?  2-3    On average, how many sweetened beverages do you drink each day (Examples: soda, juice, sweet tea, etc.  Do NOT count diet or artificially sweetened beverages)?   0    How many days per week do you exercise enough to make your heart beat faster? 3 or less    How many minutes a day do you exercise enough to make your heart beat faster? 9 or less    How many days per week do you miss taking your medication? 0      Past Medical History:   Diagnosis Date     Hypertension      ROBB (obstructive sleep apnea), severe 2/27/2019       Current Outpatient Medications   Medication Sig Dispense Refill     buPROPion (WELLBUTRIN XL) 150 MG 24 hr tablet Take 1 tablet  "(150 mg) by mouth every morning 90 tablet 1     irbesartan (AVAPRO) 150 MG tablet Take 1 tablet (150 mg) by mouth At Bedtime 90 tablet 1         Review of Systems   CONSTITUTIONAL: NEGATIVE for fever, chills, change in weight  EYES: NEGATIVE for vision changes or irritation  ENT/MOUTH: NEGATIVE for ear, mouth and throat problems  RESP: NEGATIVE for significant cough or SOB  CV: NEGATIVE for chest pain, palpitations or peripheral edema  NEURO: NEGATIVE for weakness, dizziness or paresthesias  PSYCHIATRIC: NEGATIVE for changes in mood or affect      Objective    /88   Pulse 92   Temp 98.7  F (37.1  C) (Oral)   Resp 16   Ht 1.765 m (5' 9.5\")   Wt 125.2 kg (276 lb)   SpO2 96%   BMI 40.17 kg/m    Body mass index is 40.17 kg/m .  Physical Exam   GENERAL: healthy, alert and no distress  EYES: Eyes grossly normal to inspection, PERRL and conjunctivae and sclerae normal  NECK: no adenopathy, no asymmetry, masses, or scars and thyroid normal to palpation  RESP: lungs clear to auscultation - no rales, rhonchi or wheezes  CV: regular rate and rhythm, normal S1 S2, no S3 or S4, no murmur, click or rub, no peripheral edema and peripheral pulses strong  MS: no calf tenderness  NEURO: Normal strength and tone, mentation intact and speech normal  PSYCH: mentation appears normal, affect normal/bright            "

## 2021-08-18 ASSESSMENT — ANXIETY QUESTIONNAIRES: GAD7 TOTAL SCORE: 1

## 2021-09-05 ENCOUNTER — HEALTH MAINTENANCE LETTER (OUTPATIENT)
Age: 41
End: 2021-09-05

## 2021-10-19 PROBLEM — F32.9 MAJOR DEPRESSION: Status: ACTIVE | Noted: 2020-10-20

## 2022-02-20 ENCOUNTER — HEALTH MAINTENANCE LETTER (OUTPATIENT)
Age: 42
End: 2022-02-20

## 2022-02-28 ENCOUNTER — OFFICE VISIT (OUTPATIENT)
Dept: INTERNAL MEDICINE | Facility: CLINIC | Age: 42
End: 2022-02-28
Payer: COMMERCIAL

## 2022-02-28 VITALS
WEIGHT: 287 LBS | DIASTOLIC BLOOD PRESSURE: 90 MMHG | SYSTOLIC BLOOD PRESSURE: 160 MMHG | OXYGEN SATURATION: 99 % | HEART RATE: 99 BPM | BODY MASS INDEX: 42.51 KG/M2 | RESPIRATION RATE: 20 BRPM | TEMPERATURE: 98.2 F | HEIGHT: 69 IN

## 2022-02-28 DIAGNOSIS — F32.0 MILD MAJOR DEPRESSION (H): ICD-10-CM

## 2022-02-28 DIAGNOSIS — I10 ESSENTIAL HYPERTENSION: ICD-10-CM

## 2022-02-28 DIAGNOSIS — Z72.0 TOBACCO ABUSE DISORDER: ICD-10-CM

## 2022-02-28 DIAGNOSIS — E78.2 MIXED HYPERLIPIDEMIA: ICD-10-CM

## 2022-02-28 DIAGNOSIS — E66.01 MORBID OBESITY (H): ICD-10-CM

## 2022-02-28 DIAGNOSIS — Z00.00 ROUTINE HISTORY AND PHYSICAL EXAMINATION OF ADULT: Primary | ICD-10-CM

## 2022-02-28 LAB — HGB BLD-MCNC: 15.6 G/DL (ref 13.3–17.7)

## 2022-02-28 PROCEDURE — 85018 HEMOGLOBIN: CPT | Performed by: INTERNAL MEDICINE

## 2022-02-28 PROCEDURE — 80061 LIPID PANEL: CPT | Performed by: INTERNAL MEDICINE

## 2022-02-28 PROCEDURE — 80053 COMPREHEN METABOLIC PANEL: CPT | Performed by: INTERNAL MEDICINE

## 2022-02-28 PROCEDURE — 36415 COLL VENOUS BLD VENIPUNCTURE: CPT | Performed by: INTERNAL MEDICINE

## 2022-02-28 PROCEDURE — 99396 PREV VISIT EST AGE 40-64: CPT | Performed by: INTERNAL MEDICINE

## 2022-02-28 PROCEDURE — 96127 BRIEF EMOTIONAL/BEHAV ASSMT: CPT | Performed by: INTERNAL MEDICINE

## 2022-02-28 PROCEDURE — 99214 OFFICE O/P EST MOD 30 MIN: CPT | Mod: 25 | Performed by: INTERNAL MEDICINE

## 2022-02-28 RX ORDER — IRBESARTAN 150 MG/1
150 TABLET ORAL AT BEDTIME
Qty: 90 TABLET | Refills: 1 | Status: SHIPPED | OUTPATIENT
Start: 2022-02-28 | End: 2022-09-08

## 2022-02-28 RX ORDER — HYDROCHLOROTHIAZIDE 25 MG/1
25 TABLET ORAL DAILY
Qty: 30 TABLET | Refills: 1 | Status: SHIPPED | OUTPATIENT
Start: 2022-02-28 | End: 2022-04-04

## 2022-02-28 RX ORDER — BUPROPION HYDROCHLORIDE 150 MG/1
150 TABLET ORAL EVERY MORNING
Qty: 90 TABLET | Refills: 1 | Status: SHIPPED | OUTPATIENT
Start: 2022-02-28 | End: 2022-09-08

## 2022-02-28 ASSESSMENT — ENCOUNTER SYMPTOMS
SORE THROAT: 0
PALPITATIONS: 1
HEARTBURN: 1
CONSTIPATION: 0
SHORTNESS OF BREATH: 1
FEVER: 0
DYSURIA: 0
CHILLS: 0
HEMATURIA: 0
FREQUENCY: 0
HEADACHES: 0
HEMATOCHEZIA: 0
MYALGIAS: 0
DIZZINESS: 0
ARTHRALGIAS: 0
NAUSEA: 1
JOINT SWELLING: 0
COUGH: 0
EYE PAIN: 0
PARESTHESIAS: 0
NERVOUS/ANXIOUS: 0
WEAKNESS: 0
ABDOMINAL PAIN: 0
DIARRHEA: 0

## 2022-02-28 ASSESSMENT — PATIENT HEALTH QUESTIONNAIRE - PHQ9: SUM OF ALL RESPONSES TO PHQ QUESTIONS 1-9: 4

## 2022-02-28 NOTE — PROGRESS NOTES
SUBJECTIVE:   CC: Frank Flowers is an 41 year old male who presents for preventative health visit.       Patient has been advised of split billing requirements and indicates understanding: Yes  Healthy Habits:     Getting at least 3 servings of Calcium per day:  Yes    Bi-annual eye exam:  NO    Dental care twice a year:  NO    Sleep apnea or symptoms of sleep apnea:  Daytime drowsiness and Sleep apnea    Diet:  Low salt    Frequency of exercise:  1 day/week    Duration of exercise:  30-45 minutes    Taking medications regularly:  No    Barriers to taking medications:  None and Not applicable    Medication side effects:  None    PHQ-2 Total Score: 1    Additional concerns today:  No        Hyperlipidemia Follow-Up      Are you regularly taking any medication or supplement to lower your cholesterol?   No    Are you having muscle aches or other side effects that you think could be caused by your cholesterol lowering medication?  NA    Hypertension Follow-up      Do you check your blood pressure regularly outside of the clinic? No     Are you following a low salt diet? Yes    Are your blood pressures ever more than 140 on the top number (systolic) OR more   than 90 on the bottom number (diastolic), for example 140/90? Yes    Depression Followup    How are you doing with your depression since your last visit? No change    Are you having other symptoms that might be associated with depression? No    Have you had a significant life event?  No     Are you feeling anxious or having panic attacks?   No    Do you have any concerns with your use of alcohol or other drugs? No    PHQ 4/28/2021 8/17/2021 2/28/2022   PHQ-9 Total Score 1 1 4   Q9: Thoughts of better off dead/self-harm past 2 weeks Not at all Not at all Not at all      56}  Tobacco use disorder: Patient smokes half a pack a day since age 18      Today's PHQ-2 Score:   PHQ-2 ( 1999 [employer]) 2/28/2022   Q1: Little interest or pleasure in doing things 0   Q2: Feeling  down, depressed or hopeless 1   PHQ-2 Score 1   PHQ-2 Total Score (12-17 Years)- Positive if 3 or more points; Administer PHQ-A if positive -   Q1: Little interest or pleasure in doing things Not at all   Q2: Feeling down, depressed or hopeless Several days   PHQ-2 Score 1       Abuse: Current or Past(Physical, Sexual or Emotional)- No  Do you feel safe in your environment? Yes    Have you ever done Advance Care Planning? (For example, a Health Directive, POLST, or a discussion with a medical provider or your loved ones about your wishes): No, advance care planning information given to patient to review.  Patient declined advance care planning discussion at this time.      Past Medical History:   Diagnosis Date     Hypertension      ROBB (obstructive sleep apnea), severe 2/27/2019       Past Surgical History:   Procedure Laterality Date     DENTAL SURGERY      abscessed tooth removed under anesthesia     OPEN REDUCTION INTERNAL FIXATION RODDING INTRAMEDULLARY TIBIA Left 1/14/2018    Procedure: OPEN REDUCTION INTERNAL FIXATION RODDING INTRAMEDULLARY TIBIA;  Intramedullary nailing, left tibial shaft fracture;  Surgeon: Jian Burgos MD;  Location:  OR       Current Outpatient Medications   Medication Sig Dispense Refill     buPROPion (WELLBUTRIN XL) 150 MG 24 hr tablet Take 1 tablet (150 mg) by mouth every morning 90 tablet 1     hydrochlorothiazide (HYDRODIURIL) 25 MG tablet Take 1 tablet (25 mg) by mouth daily 30 tablet 1     irbesartan (AVAPRO) 150 MG tablet Take 1 tablet (150 mg) by mouth At Bedtime 90 tablet 1       Family History   Problem Relation Age of Onset     Depression Mother      Anxiety Disorder Mother      Unknown/Adopted Father      Unknown/Adopted Maternal Grandmother      Unknown/Adopted Maternal Grandfather      Unknown/Adopted Paternal Grandmother      Unknown/Adopted Paternal Grandfather      Unknown/Adopted Brother      Anxiety Disorder Sister      Sleep Apnea Sister        Social  "History     Tobacco Use     Smoking status: Current Every Day Smoker     Packs/day: 0.50     Years: 23.00     Pack years: 11.50     Types: Cigarettes     Smokeless tobacco: Never Used     Tobacco comment: smoking since age 18   Substance Use Topics     Alcohol use: Yes     Comment: 6 drinks per week         Last PSA: No results found for: PSA    Reviewed orders with patient. Reviewed health maintenance and updated orders accordingly - Yes      Reviewed and updated as needed this visit by clinical staff   Tobacco  Allergies  Meds   Med Hx  Surg Hx  Fam Hx  Soc Hx        Reviewed and updated as needed this visit by Provider                     Review of Systems   Constitutional: Negative for chills and fever.   HENT: Negative for congestion, ear pain, hearing loss and sore throat.    Eyes: Negative for pain and visual disturbance.   Respiratory: Negative for cough.    Cardiovascular: Negative for chest pain and peripheral edema.   Gastrointestinal: Negative for abdominal pain, constipation, diarrhea and hematochezia.   Genitourinary: Negative for dysuria, frequency, genital sores, hematuria, impotence, penile discharge and urgency.   Musculoskeletal: Negative for arthralgias, joint swelling and myalgias.   Skin: Negative for rash.   Neurological: Negative for dizziness, weakness, headaches and paresthesias.   Psychiatric/Behavioral: Negative for mood changes. The patient is not nervous/anxious.       OBJECTIVE:   BP (!) 160/90   Pulse 99   Temp 98.2  F (36.8  C) (Oral)   Resp 20   Ht 1.759 m (5' 9.25\")   Wt 130.2 kg (287 lb)   SpO2 99%   BMI 42.08 kg/m   rpt /98  Physical Exam  GENERAL:  alert and no distress  EYES: Eyes grossly normal to inspection, PERRL and conjunctivae and sclerae normal  NECK: no adenopathy, no asymmetry, masses, or scars and thyroid normal to palpation  RESP: lungs clear to auscultation - no rales, rhonchi or wheezes  CV: regular rate and rhythm, normal S1 S2,   ABDOMEN: soft, " "nontender, no hepatosplenomegaly, no masses and bowel sounds normal  MS: no gross musculoskeletal defects noted, no edema  NEURO: Normal strength and tone, mentation intact and speech normal  PSYCH: mentation appears normal, affect normal/bright    ASSESSMENT/PLAN:        (Z00.00) Routine history and physical examination of adult  (primary encounter diagnosis)  Plan: Comprehensive metabolic panel, Lipid panel         reflex to direct LDL Fasting, Hemoglobin            (I10) Essential hypertension  Comment: BP elevated   Plan: started on hydrochlorothiazide (HYDRODIURIL) 25 MG tablet, as directed.explained clearly about the medication,insructions and side effects. Continue         irbesartan (AVAPRO) 150 MG tablet refilled.explained clearly about the medication,insructions and side effects.   Advised to follow low salt diet and exercise and f/u in 4 wks.  Strongly advised to quit smoking            (F32.0) Mild major depression (H)  Comment: Stable  Plan: buPROPion (WELLBUTRIN XL) 150 MG 24 hr tablet refilled as directed.explained clearly about the medication,insructions and side effects.             (E66.01) Morbid obesity (H)  Plan:  Discussed in detail about Diet,calorie intake,and importance of regular exercise,       (Z72.0) Tobacco abuse disorder  Plan: Counseled on smoking cessation patient states he will try on his own, currently also taking Wellbutrin    (E78.2) Mixed hyperlipidemia  Plan: Not on medications at this time, check lipid panel      Patient has been advised of split billing requirements and indicates understanding: Yes    COUNSELING:   Reviewed preventive health counseling, as reflected in patient instructions       Regular exercise       Healthy diet/nutrition    Estimated body mass index is 42.08 kg/m  as calculated from the following:    Height as of this encounter: 1.759 m (5' 9.25\").    Weight as of this encounter: 130.2 kg (287 lb).     Weight management plan: Discussed healthy diet and " exercise guidelines    He reports that he has been smoking cigarettes. He has a 11.50 pack-year smoking history. He has never used smokeless tobacco.  Tobacco Cessation Action Plan:   Information offered: Patient not interested at this time patient states he will try on his own to quit smoking.      Counseling Resources:  ATP IV Guidelines  Pooled Cohorts Equation Calculator  FRAX Risk Assessment  ICSI Preventive Guidelines  Dietary Guidelines for Americans, 2010  USDA's MyPlate  ASA Prophylaxis  Lung CA Screening    Yolanda Parikh MD  [facility]

## 2022-03-01 LAB
ALBUMIN SERPL-MCNC: 3.5 G/DL (ref 3.4–5)
ALP SERPL-CCNC: 81 U/L (ref 40–150)
ALT SERPL W P-5'-P-CCNC: 71 U/L (ref 0–70)
ANION GAP SERPL CALCULATED.3IONS-SCNC: 7 MMOL/L (ref 3–14)
AST SERPL W P-5'-P-CCNC: 31 U/L (ref 0–45)
BILIRUB SERPL-MCNC: 0.3 MG/DL (ref 0.2–1.3)
BUN SERPL-MCNC: 18 MG/DL (ref 7–30)
CALCIUM SERPL-MCNC: 8.7 MG/DL (ref 8.5–10.1)
CHLORIDE BLD-SCNC: 110 MMOL/L (ref 94–109)
CHOLEST SERPL-MCNC: 203 MG/DL
CO2 SERPL-SCNC: 22 MMOL/L (ref 20–32)
CREAT SERPL-MCNC: 1.1 MG/DL (ref 0.66–1.25)
FASTING STATUS PATIENT QL REPORTED: YES
GFR SERPL CREATININE-BSD FRML MDRD: 86 ML/MIN/1.73M2
GLUCOSE BLD-MCNC: 92 MG/DL (ref 70–99)
HDLC SERPL-MCNC: 52 MG/DL
LDLC SERPL CALC-MCNC: 132 MG/DL
NONHDLC SERPL-MCNC: 151 MG/DL
POTASSIUM BLD-SCNC: 4.2 MMOL/L (ref 3.4–5.3)
PROT SERPL-MCNC: 7.7 G/DL (ref 6.8–8.8)
SODIUM SERPL-SCNC: 139 MMOL/L (ref 133–144)
TRIGL SERPL-MCNC: 96 MG/DL

## 2022-04-04 ENCOUNTER — OFFICE VISIT (OUTPATIENT)
Dept: INTERNAL MEDICINE | Facility: CLINIC | Age: 42
End: 2022-04-04
Payer: COMMERCIAL

## 2022-04-04 VITALS
HEIGHT: 70 IN | BODY MASS INDEX: 40.67 KG/M2 | HEART RATE: 98 BPM | TEMPERATURE: 98.1 F | OXYGEN SATURATION: 99 % | WEIGHT: 284.1 LBS | RESPIRATION RATE: 18 BRPM | DIASTOLIC BLOOD PRESSURE: 84 MMHG | SYSTOLIC BLOOD PRESSURE: 137 MMHG

## 2022-04-04 DIAGNOSIS — I10 ESSENTIAL HYPERTENSION: Primary | ICD-10-CM

## 2022-04-04 DIAGNOSIS — R74.8 ELEVATED LIVER ENZYMES: ICD-10-CM

## 2022-04-04 PROCEDURE — 99213 OFFICE O/P EST LOW 20 MIN: CPT | Performed by: INTERNAL MEDICINE

## 2022-04-04 RX ORDER — HYDROCHLOROTHIAZIDE 25 MG/1
25 TABLET ORAL DAILY
Qty: 90 TABLET | Refills: 1 | Status: SHIPPED | OUTPATIENT
Start: 2022-04-04 | End: 2022-09-08

## 2022-04-04 NOTE — NURSING NOTE
"/88 (BP Location: Right arm, Patient Position: Sitting, Cuff Size: Adult Large)   Pulse 111   Temp 98.1  F (36.7  C) (Oral)   Resp 18   Ht 1.765 m (5' 9.5\")   Wt 128.9 kg (284 lb 1.6 oz)   SpO2 99%   BMI 41.35 kg/m      "

## 2022-04-04 NOTE — PROGRESS NOTES
Assessment & Plan     (I10) Essential hypertension  (primary encounter diagnosis)  Comment: BP significantly improved.   Plan: continue Avapro 150 mg daily and refilled  hydrochlorothiazide (HYDRODIURIL) 25 MG tablet as directed.explained clearly about the medication,insructions and side effects. Advised to follow low salt diet and exercise and f/u in 08/22       (R74.8) Elevated liver enzymes  Plan: ALT, AST.pt was told I will contact after results and proceed accordingly.       Review of the result(s) of each unique test - ALT/AST   Prescription drug management       Return in about 5 months (around 8/19/2022) for BP Recheck, Depression Follow up.    Yolanda Parikh MD  [facility] is a 41 year old who presents for the following health issues     HPI     Hypertension Follow-up      Do you check your blood pressure regularly outside of the clinic? No , patient was started on hydrochlorothiazide 25 mg 1 tablet daily at last visit, toleratingwell.  Also on Avapro 150 mg daily    Are you following a low salt diet? Yes    Are your blood pressures ever more than 140 on the top number (systolic) OR more   than 90 on the bottom number (diastolic), for example 140/90? No   24149}    How many servings of fruits and vegetables do you eat daily?  0-1    On average, how many sweetened beverages do you drink each day (Examples: soda, juice, sweet tea, etc.  Do NOT count diet or artificially sweetened beverages)?   0    How many days per week do you exercise enough to make your heart beat faster? 3 or less    How many minutes a day do you exercise enough to make your heart beat faster? 20 - 29    How many days per week do you miss taking your medication? 0        Past Medical History:   Diagnosis Date     Hypertension      ROBB (obstructive sleep apnea), severe 2/27/2019     Severe obstructive sleep apnea        Current Outpatient Medications   Medication Sig Dispense  "Refill     buPROPion (WELLBUTRIN XL) 150 MG 24 hr tablet Take 1 tablet (150 mg) by mouth every morning 90 tablet 1     hydrochlorothiazide (HYDRODIURIL) 25 MG tablet Take 1 tablet (25 mg) by mouth daily 90 tablet 1     irbesartan (AVAPRO) 150 MG tablet Take 1 tablet (150 mg) by mouth At Bedtime 90 tablet 1         Review of Systems   CONSTITUTIONAL: NEGATIVE for fever, chills, change in weight  RESP: NEGATIVE for significant cough or SOB  CV: NEGATIVE for chest pain, palpitations or peripheral edema  MUSCULOSKELETAL: NEGATIVE for significant arthralgias or myalgia      Objective    /88 (BP Location: Right arm, Patient Position: Sitting, Cuff Size: Adult Large)   Pulse 111   Temp 98.1  F (36.7  C) (Oral)   Resp 18   Ht 1.765 m (5' 9.5\")   Wt 128.9 kg (284 lb 1.6 oz)   SpO2 99%   BMI 41.35 kg/m    Body mass index is 41.35 kg/m .  Physical Exam   GENERAL: healthy, alert and no distress  RESP: lungs clear to auscultation - no rales, rhonchi or wheezes  CV: regular rate and rhythm, normal S1 S2,    MS: no gross musculoskeletal defects noted, no edema       "

## 2022-07-12 ENCOUNTER — VIRTUAL VISIT (OUTPATIENT)
Dept: SLEEP MEDICINE | Facility: CLINIC | Age: 42
End: 2022-07-12
Payer: COMMERCIAL

## 2022-07-12 VITALS — WEIGHT: 255 LBS | BODY MASS INDEX: 36.51 KG/M2 | HEIGHT: 70 IN

## 2022-07-12 DIAGNOSIS — G47.33 OSA (OBSTRUCTIVE SLEEP APNEA): Primary | ICD-10-CM

## 2022-07-12 PROCEDURE — 99213 OFFICE O/P EST LOW 20 MIN: CPT | Mod: GT | Performed by: PHYSICIAN ASSISTANT

## 2022-07-12 ASSESSMENT — SLEEP AND FATIGUE QUESTIONNAIRES
HOW LIKELY ARE YOU TO NOD OFF OR FALL ASLEEP WHILE SITTING QUIETLY AFTER LUNCH WITHOUT ALCOHOL: WOULD NEVER DOZE
HOW LIKELY ARE YOU TO NOD OFF OR FALL ASLEEP WHILE SITTING AND READING: SLIGHT CHANCE OF DOZING
HOW LIKELY ARE YOU TO NOD OFF OR FALL ASLEEP WHILE SITTING INACTIVE IN A PUBLIC PLACE: SLIGHT CHANCE OF DOZING
HOW LIKELY ARE YOU TO NOD OFF OR FALL ASLEEP WHEN YOU ARE A PASSENGER IN A CAR FOR AN HOUR WITHOUT A BREAK: MODERATE CHANCE OF DOZING
HOW LIKELY ARE YOU TO NOD OFF OR FALL ASLEEP WHILE SITTING AND TALKING TO SOMEONE: WOULD NEVER DOZE
HOW LIKELY ARE YOU TO NOD OFF OR FALL ASLEEP IN A CAR, WHILE STOPPED FOR A FEW MINUTES IN TRAFFIC: WOULD NEVER DOZE
HOW LIKELY ARE YOU TO NOD OFF OR FALL ASLEEP WHILE LYING DOWN TO REST IN THE AFTERNOON WHEN CIRCUMSTANCES PERMIT: SLIGHT CHANCE OF DOZING
HOW LIKELY ARE YOU TO NOD OFF OR FALL ASLEEP WHILE WATCHING TV: SLIGHT CHANCE OF DOZING

## 2022-07-12 NOTE — PROGRESS NOTES
[facility]   Outpatient Sleep Medicine  Jul 12, 2022       Name: Frank Flowers MRN# [id number]   Age: 42 year old YOB: 1980            Assessment and Plan:   1. ROBB (obstructive sleep apnea)  Patient's sleep apnea is adequately managed and well treated with current PAP settings [id number] with low residual AHI of 0.3 events per hour. Compliance is excellent.  Tolerating PAP well. No indication to adjust pressure settings today. Will continue nightly therapy. Prescription renewed for mask/supplies.  - Comprehensive DME    Frank Flowers will follow up in about 1-2 years, or sooner as needed.        Chief Complaint      Chief Complaint   Patient presents with     Video Visit     CPAP F/U, pt needing a prescription for new CPAP supplies           History of Present Illness:     Frank Flowers is a 42 year old male who presents to the clinic for follow-up of their severe obstructive sleep apnea treated with CPAP. Other past medical history HTN, HLD, depression, obesity.    Originally diagnosed via HST on 2/13/2019 (262#, BMI 37.6) showed AHI 58.4, supine AHI 96.6, 6.3 minutes spent <=88%, onofre 85%.     Presents today for routine CPAP follow-up. Last visit 2/2021. Primary reason for visit is because he is in need of prescription for new mask/supplies.     Patient is using a full face mask. The mask is comfortable. The mask is leaking, rip in current mask. They are not snoring with the mask on. They are not having gasp arousals.  They are not having significant oral/nasal dryness. The pressure settings are comfortable.     Bedtime is typically 10:00PM. Usually it takes about 10 minutes to fall asleep with the mask on. Wake time is typically 6:00AM.  Patient is using PAP therapy 8.5 hours per night. The patient is usually getting 8 hours of sleep per night. Wakes feeling well rested in AM.     ResMed Auto-PAP 5.0 - 15.0 cmH2O 30 day usage data:    100% of days with > 4 hours of use.  "0/30 days with no use.   Average use 521 minutes per day.   95%ile Leak 31.39 L/min.   CPAP 95% pressure 14.7 cm.   AHI 0.3 events per hour.     SCALES:   INSOMNIA: Insomnia Severity Score: 11   SLEEPINESS: [place] Sleepiness Score: 6    Past medical/surgical history, family history, social history, medications and allergies were reviewed.           Physical Examination:   Ht 1.778 m (5' 10\")   Wt 115.7 kg (255 lb)   BMI 36.59 kg/m    General appearance: Awake, alert, cooperative. Well groomed. In no apparent distress.  HEENT: Head: Normocephalic, atraumatic. Eyes:Conjunctiva clear. Sclera normal. Nose: External appearance without deformity.   Pulmonary:  Able to speak easily in full sentences. No cough or wheeze.   Skin:  No rashes or significant lesions on visible skin.   Neurologic: Alert, oriented x3.   Psychiatric: Mood euthymic. Affect congruent with full range and intensity.      CC:  Yolanda Parikh PA-C  Jul 12, 2022     [facility]  [address] [place] [address]     [facility]  [address] [address]    Chart documentation was completed, in part, with [employer] voice-recognition software. Even though reviewed, some grammatical, spelling, and word errors may remain.    "

## 2022-07-12 NOTE — PROGRESS NOTES
Kobe is a 42 year old who is being evaluated via a billable video visit.      How would you like to obtain your AVS? MyChart  If the video visit is dropped, the invitation should be resent by: Text to cell phone: [phone]   Will anyone else be joining your video visit? No    Medication and allergies have been reviewed.       JORGE Vargas      Video-Visit Details    Video Start Time: 12:02PM    Type of service:  Video Visit    Video End Time:12:08PM    Originating Location (pt. Location): Home    Distant Location (provider location):  [facility]     Platform used for Video Visit: Aurelia Pacheco PA-C

## 2022-07-19 NOTE — NURSING NOTE
DME orders have been automatically faxed to [facility]. 2 year appointment reminder will be sent via My Chart. Roewna Shea CMA

## 2022-09-08 ENCOUNTER — VIRTUAL VISIT (OUTPATIENT)
Dept: INTERNAL MEDICINE | Facility: CLINIC | Age: 42
End: 2022-09-08
Payer: COMMERCIAL

## 2022-09-08 DIAGNOSIS — E78.2 MIXED HYPERLIPIDEMIA: Primary | ICD-10-CM

## 2022-09-08 DIAGNOSIS — I10 ESSENTIAL HYPERTENSION: ICD-10-CM

## 2022-09-08 DIAGNOSIS — F32.0 MILD MAJOR DEPRESSION (H): ICD-10-CM

## 2022-09-08 DIAGNOSIS — Z72.0 TOBACCO ABUSE DISORDER: ICD-10-CM

## 2022-09-08 PROCEDURE — 99214 OFFICE O/P EST MOD 30 MIN: CPT | Mod: GT | Performed by: INTERNAL MEDICINE

## 2022-09-08 RX ORDER — IRBESARTAN 150 MG/1
150 TABLET ORAL AT BEDTIME
Qty: 90 TABLET | Refills: 0 | Status: SHIPPED | OUTPATIENT
Start: 2022-09-08 | End: 2022-12-07

## 2022-09-08 RX ORDER — BUPROPION HYDROCHLORIDE 150 MG/1
150 TABLET ORAL EVERY MORNING
Qty: 90 TABLET | Refills: 1 | Status: SHIPPED | OUTPATIENT
Start: 2022-09-08 | End: 2023-03-20 | Stop reason: DRUGHIGH

## 2022-09-08 RX ORDER — HYDROCHLOROTHIAZIDE 25 MG/1
25 TABLET ORAL DAILY
Qty: 90 TABLET | Refills: 0 | Status: SHIPPED | OUTPATIENT
Start: 2022-09-08 | End: 2022-11-01

## 2022-09-08 ASSESSMENT — PATIENT HEALTH QUESTIONNAIRE - PHQ9: SUM OF ALL RESPONSES TO PHQ QUESTIONS 1-9: 3

## 2022-09-08 NOTE — PROGRESS NOTES
"Kobe is a 42 year old who is being evaluated via a billable video visit.      How would you like to obtain your AVS? MyChart  If the video visit is dropped, the invitation should be resent by: Text to cell phone: [phone]  Will anyone else be joining your video visit? No          Assessment & Plan     (E78.2) Mixed hyperlipidemia  (primary encounter diagnosis)  Comment: Not on any medications at this time  Plan: Check comprehensive metabolic panel, Lipid panel         reflex to direct LDL Fasting            (F32.0) Mild major depression (H)  Comment: Stable  Plan: buPROPion (WELLBUTRIN XL) 150 MG 24 hr tablet refilled as directed.explained clearly about the medication,insructions and side effects.       (I10) Essential hypertension  Plan: hydrochlorothiazide (HYDRODIURIL) 25 MG tablet, irbesartan (AVAPRO) 150 MG tablet, refilled as directed.explained clearly about the medication,insructions and side effects.  Check Comprehensive metabolic panel            (Z72.0) Tobacco abuse disorder  Plan: Counseled on smoking cessation, patient states he is trying to cut        Prescription drug management   }     BMI:   Estimated body mass index is 36.59 kg/m  as calculated from the following:    Height as of 7/12/22: 1.778 m (5' 10\").    Weight as of 7/12/22: 115.7 kg (255 lb).   Weight management plan: Discussed healthy diet and exercise guidelines        Return in about 1 week (around 9/15/2022) for Lab Work.    Yolanda Parikh MD  [facility]    Subjective   Kobe is a 42 year old presenting for the following health issues:  Hypertension, Lipids, and Depression      HPI     Hyperlipidemia Follow-Up      Are you regularly taking any medication or supplement to lower your cholesterol?   No    Are you having muscle aches or other side effects that you think could be caused by your cholesterol lowering medication?  NA    Hypertension Follow-up      Do you check your blood pressure regularly " outside of the clinic? No     Are you following a low salt diet? Yes    Are your blood pressures ever more than 140 on the top number (systolic) OR more   than 90 on the bottom number (diastolic), for example 140/90? Yes    Depression Followup    How are you doing with your depression since your last visit? No change    Are you having other symptoms that might be associated with depression? No    Have you had a significant life event?  No     Are you feeling anxious or having panic attacks?   No    Do you have any concerns with your use of alcohol or other drugs? No      PHQ 8/17/2021 2/28/2022 9/8/2022   PHQ-9 Total Score 1 4 3   Q9: Thoughts of better off dead/self-harm past 2 weeks Not at all Not at all Not at all        H/o Tobacco use -patient smokes half a pack a day and trying to cut down.      Past Medical History:   Diagnosis Date     Hypertension      ROBB (obstructive sleep apnea), severe 2/27/2019     Severe obstructive sleep apnea        Current Outpatient Medications   Medication Sig Dispense Refill     buPROPion (WELLBUTRIN XL) 150 MG 24 hr tablet Take 1 tablet (150 mg) by mouth every morning 90 tablet 1     hydrochlorothiazide (HYDRODIURIL) 25 MG tablet Take 1 tablet (25 mg) by mouth daily 90 tablet 0     irbesartan (AVAPRO) 150 MG tablet Take 1 tablet (150 mg) by mouth At Bedtime 90 tablet 0         Review of Systems   CONSTITUTIONAL: NEGATIVE for fever, chills, change in weight  RESP: NEGATIVE for significant cough or SOB  CV: NEGATIVE for chest pain, palpitations or peripheral edema  MUSCULOSKELETAL: NEGATIVE for significant arthralgias or myalgia      Objective           Vitals:  No vitals were obtained today due to virtual visit.    Physical Exam   GENERAL: Healthy, alert and no distress  RESP: No audible wheeze, cough, or visible cyanosis.  No visible retractions or increased work of breathing.    PSYCH: Mentation appears normal, affect normal/bright, judgement and insight intact, normal speech  and appearance well-groomed.       Video-Visit Details    Video Start Time: 11:12 AM    Type of service:  Video Visit    Video End Time:11:21 AM    Originating Location (pt. Location): Home    Distant Location (provider location):  [facility]     Platform used for Video Visit: Curry

## 2022-09-23 DIAGNOSIS — F32.0 MILD MAJOR DEPRESSION (H): ICD-10-CM

## 2022-09-23 RX ORDER — BUPROPION HYDROCHLORIDE 150 MG/1
TABLET ORAL
Qty: 90 TABLET | Refills: 0 | OUTPATIENT
Start: 2022-09-23

## 2022-09-23 NOTE — TELEPHONE ENCOUNTER
Medication refused, Medication refilled on 9/8/22 for 90 day supply sent to [facility] in [place].  Medication requested from different pharmacy.      Called patient and he stated that he is not needing a refill at this time.

## 2022-10-23 ENCOUNTER — HEALTH MAINTENANCE LETTER (OUTPATIENT)
Age: 42
End: 2022-10-23

## 2022-10-29 DIAGNOSIS — I10 ESSENTIAL HYPERTENSION: ICD-10-CM

## 2022-11-01 RX ORDER — HYDROCHLOROTHIAZIDE 25 MG/1
TABLET ORAL
Qty: 90 TABLET | Refills: 0 | Status: SHIPPED | OUTPATIENT
Start: 2022-11-01 | End: 2022-12-02

## 2022-11-03 ENCOUNTER — MYC MEDICAL ADVICE (OUTPATIENT)
Dept: INTERNAL MEDICINE | Facility: CLINIC | Age: 42
End: 2022-11-03

## 2022-12-02 ENCOUNTER — MYC REFILL (OUTPATIENT)
Dept: INTERNAL MEDICINE | Facility: CLINIC | Age: 42
End: 2022-12-02

## 2022-12-02 DIAGNOSIS — I10 ESSENTIAL HYPERTENSION: ICD-10-CM

## 2022-12-05 DIAGNOSIS — I10 ESSENTIAL HYPERTENSION: ICD-10-CM

## 2022-12-05 RX ORDER — HYDROCHLOROTHIAZIDE 25 MG/1
25 TABLET ORAL DAILY
Qty: 90 TABLET | Refills: 0 | Status: SHIPPED | OUTPATIENT
Start: 2022-12-05 | End: 2023-02-21

## 2022-12-06 NOTE — TELEPHONE ENCOUNTER
Pending Prescriptions:                       Disp   Refills    hydrochlorothiazide (HYDRODIURIL) 25 MG t*90 tab*0            Sig: Take 1 tablet (25 mg) by mouth daily    Prescription approved per [facility] Refill Protocol.    Next 5 appointments (look out 90 days)    Jan 30, 2023  7:00 AM  (Arrive by 6:45 AM)  Provider Visit with Yolanda Parikh MD  [facility] ([facility] ) [address]  [address]  [facility] [address] [phone]

## 2022-12-07 RX ORDER — IRBESARTAN 150 MG/1
TABLET ORAL
Qty: 90 TABLET | Refills: 0 | Status: SHIPPED | OUTPATIENT
Start: 2022-12-07 | End: 2023-03-20

## 2022-12-07 NOTE — TELEPHONE ENCOUNTER
Routing refill request to provider for review/approval because:  Labs not current:  Patient was to come back and have lab work in September.      Myc message sent to patient.

## 2023-01-30 ENCOUNTER — MYC MEDICAL ADVICE (OUTPATIENT)
Dept: INTERNAL MEDICINE | Facility: CLINIC | Age: 43
End: 2023-01-30
Payer: COMMERCIAL

## 2023-01-30 ENCOUNTER — LAB (OUTPATIENT)
Dept: LAB | Facility: CLINIC | Age: 43
End: 2023-01-30
Payer: COMMERCIAL

## 2023-01-30 DIAGNOSIS — E78.2 MIXED HYPERLIPIDEMIA: Primary | ICD-10-CM

## 2023-01-30 DIAGNOSIS — R74.8 ELEVATED LIVER ENZYMES: ICD-10-CM

## 2023-01-30 LAB
ALT SERPL W P-5'-P-CCNC: 88 U/L (ref 10–50)
AST SERPL W P-5'-P-CCNC: 52 U/L (ref 10–50)

## 2023-01-30 PROCEDURE — 80061 LIPID PANEL: CPT | Performed by: INTERNAL MEDICINE

## 2023-01-30 PROCEDURE — 80053 COMPREHEN METABOLIC PANEL: CPT | Performed by: INTERNAL MEDICINE

## 2023-01-31 LAB
ALBUMIN SERPL BCG-MCNC: 4.2 G/DL (ref 3.5–5.2)
ALP SERPL-CCNC: 74 U/L (ref 40–129)
ALT SERPL W P-5'-P-CCNC: 89 U/L (ref 10–50)
ANION GAP SERPL CALCULATED.3IONS-SCNC: 16 MMOL/L (ref 7–15)
AST SERPL W P-5'-P-CCNC: 52 U/L (ref 10–50)
BILIRUB SERPL-MCNC: 0.7 MG/DL
BUN SERPL-MCNC: 19.4 MG/DL (ref 6–20)
CALCIUM SERPL-MCNC: 9.3 MG/DL (ref 8.6–10)
CHLORIDE SERPL-SCNC: 101 MMOL/L (ref 98–107)
CHOLEST SERPL-MCNC: 226 MG/DL
CREAT SERPL-MCNC: 1.17 MG/DL (ref 0.67–1.17)
DEPRECATED HCO3 PLAS-SCNC: 25 MMOL/L (ref 22–29)
GFR SERPL CREATININE-BSD FRML MDRD: 79 ML/MIN/1.73M2
GLUCOSE SERPL-MCNC: 102 MG/DL (ref 70–99)
HDLC SERPL-MCNC: 51 MG/DL
LDLC SERPL CALC-MCNC: 151 MG/DL
NONHDLC SERPL-MCNC: 175 MG/DL
POTASSIUM SERPL-SCNC: 4.2 MMOL/L (ref 3.4–5.3)
PROT SERPL-MCNC: 7.8 G/DL (ref 6.4–8.3)
SODIUM SERPL-SCNC: 142 MMOL/L (ref 136–145)
TRIGL SERPL-MCNC: 122 MG/DL

## 2023-02-18 DIAGNOSIS — I10 ESSENTIAL HYPERTENSION: ICD-10-CM

## 2023-02-20 ENCOUNTER — MYC REFILL (OUTPATIENT)
Dept: INTERNAL MEDICINE | Facility: CLINIC | Age: 43
End: 2023-02-20
Payer: COMMERCIAL

## 2023-02-20 DIAGNOSIS — I10 ESSENTIAL HYPERTENSION: ICD-10-CM

## 2023-02-21 RX ORDER — HYDROCHLOROTHIAZIDE 25 MG/1
TABLET ORAL
Qty: 90 TABLET | Refills: 0 | Status: SHIPPED | OUTPATIENT
Start: 2023-02-21 | End: 2023-03-20

## 2023-02-21 NOTE — TELEPHONE ENCOUNTER
Hydrochlorothiazide 25 mg tab  Medication is being filled for 1 time refill only due to:  Patient needs to be seen because due for follow up.   Appointments in Next Year    Mar 20, 2023  3:30 PM  (Arrive by 3:10 PM)  Provider Visit with Yolanda Parikh MD  [facility] ([facility] ) [phone]

## 2023-02-22 RX ORDER — HYDROCHLOROTHIAZIDE 25 MG/1
25 TABLET ORAL DAILY
Qty: 90 TABLET | Refills: 0 | OUTPATIENT
Start: 2023-02-22

## 2023-03-20 ENCOUNTER — OFFICE VISIT (OUTPATIENT)
Dept: INTERNAL MEDICINE | Facility: CLINIC | Age: 43
End: 2023-03-20
Payer: COMMERCIAL

## 2023-03-20 VITALS
BODY MASS INDEX: 39.56 KG/M2 | HEIGHT: 70 IN | TEMPERATURE: 97.9 F | DIASTOLIC BLOOD PRESSURE: 84 MMHG | RESPIRATION RATE: 16 BRPM | SYSTOLIC BLOOD PRESSURE: 136 MMHG | OXYGEN SATURATION: 97 % | HEART RATE: 103 BPM | WEIGHT: 276.3 LBS

## 2023-03-20 DIAGNOSIS — F32.0 MILD MAJOR DEPRESSION (H): ICD-10-CM

## 2023-03-20 DIAGNOSIS — E66.01 MORBID OBESITY (H): ICD-10-CM

## 2023-03-20 DIAGNOSIS — K76.0 HEPATIC STEATOSIS: ICD-10-CM

## 2023-03-20 DIAGNOSIS — R93.5 ABNORMAL CT OF THE ABDOMEN: ICD-10-CM

## 2023-03-20 DIAGNOSIS — E78.2 MIXED HYPERLIPIDEMIA: ICD-10-CM

## 2023-03-20 DIAGNOSIS — I10 ESSENTIAL HYPERTENSION: Primary | ICD-10-CM

## 2023-03-20 PROCEDURE — 96127 BRIEF EMOTIONAL/BEHAV ASSMT: CPT | Performed by: INTERNAL MEDICINE

## 2023-03-20 PROCEDURE — 99215 OFFICE O/P EST HI 40 MIN: CPT | Performed by: INTERNAL MEDICINE

## 2023-03-20 RX ORDER — ROSUVASTATIN CALCIUM 10 MG/1
10 TABLET, COATED ORAL DAILY
Qty: 90 TABLET | Refills: 0 | Status: SHIPPED | OUTPATIENT
Start: 2023-03-20 | End: 2023-04-25

## 2023-03-20 RX ORDER — BUPROPION HYDROCHLORIDE 300 MG/1
300 TABLET ORAL EVERY MORNING
Qty: 30 TABLET | Refills: 0 | Status: SHIPPED | OUTPATIENT
Start: 2023-03-20 | End: 2023-04-20

## 2023-03-20 RX ORDER — IRBESARTAN 150 MG/1
150 TABLET ORAL AT BEDTIME
Qty: 90 TABLET | Refills: 1 | Status: SHIPPED | OUTPATIENT
Start: 2023-03-20 | End: 2024-01-08

## 2023-03-20 RX ORDER — HYDROCHLOROTHIAZIDE 25 MG/1
25 TABLET ORAL DAILY
Qty: 90 TABLET | Refills: 1 | Status: SHIPPED | OUTPATIENT
Start: 2023-03-20 | End: 2023-09-18

## 2023-03-20 ASSESSMENT — ANXIETY QUESTIONNAIRES
7. FEELING AFRAID AS IF SOMETHING AWFUL MIGHT HAPPEN: SEVERAL DAYS
GAD7 TOTAL SCORE: 9
GAD7 TOTAL SCORE: 9
1. FEELING NERVOUS, ANXIOUS, OR ON EDGE: SEVERAL DAYS
8. IF YOU CHECKED OFF ANY PROBLEMS, HOW DIFFICULT HAVE THESE MADE IT FOR YOU TO DO YOUR WORK, TAKE CARE OF THINGS AT HOME, OR GET ALONG WITH OTHER PEOPLE?: SOMEWHAT DIFFICULT
6. BECOMING EASILY ANNOYED OR IRRITABLE: SEVERAL DAYS
GAD7 TOTAL SCORE: 9
3. WORRYING TOO MUCH ABOUT DIFFERENT THINGS: MORE THAN HALF THE DAYS
IF YOU CHECKED OFF ANY PROBLEMS ON THIS QUESTIONNAIRE, HOW DIFFICULT HAVE THESE PROBLEMS MADE IT FOR YOU TO DO YOUR WORK, TAKE CARE OF THINGS AT HOME, OR GET ALONG WITH OTHER PEOPLE: SOMEWHAT DIFFICULT
4. TROUBLE RELAXING: SEVERAL DAYS
7. FEELING AFRAID AS IF SOMETHING AWFUL MIGHT HAPPEN: SEVERAL DAYS
5. BEING SO RESTLESS THAT IT IS HARD TO SIT STILL: NOT AT ALL
2. NOT BEING ABLE TO STOP OR CONTROL WORRYING: NEARLY EVERY DAY

## 2023-03-20 ASSESSMENT — PATIENT HEALTH QUESTIONNAIRE - PHQ9
SUM OF ALL RESPONSES TO PHQ QUESTIONS 1-9: 9
SUM OF ALL RESPONSES TO PHQ QUESTIONS 1-9: 9
10. IF YOU CHECKED OFF ANY PROBLEMS, HOW DIFFICULT HAVE THESE PROBLEMS MADE IT FOR YOU TO DO YOUR WORK, TAKE CARE OF THINGS AT HOME, OR GET ALONG WITH OTHER PEOPLE: SOMEWHAT DIFFICULT

## 2023-03-20 NOTE — NURSING NOTE
"/84   Pulse 103   Temp 97.9  F (36.6  C) (Oral)   Resp 16   Ht 1.765 m (5' 9.5\")   Wt 125.3 kg (276 lb 4.8 oz)   SpO2 97%   BMI 40.22 kg/m      "

## 2023-03-20 NOTE — PROGRESS NOTES
"  Assessment & Plan     (I10) Essential hypertension  (primary encounter diagnosis)  Comment:BP stable  Plan: hydrochlorothiazide (HYDRODIURIL) 25 MG tablet, irbesartan (AVAPRO) 150 MG tablet refilled as directed.explained clearly about the medication,insructions and side effects. Advised to follow low salt diet and exercise       (E78.2) Mixed hyperlipidemia  Plan: -has  increased since before, started on rosuvastatin (CRESTOR) 10 MG tablet daily as directed.explained clearly about the medication,insructions and side effects.  Advised to follow low-fat diet regular exercise            (E66.01) Morbid obesity (H)  Plan: Discussed in detail about Diet,calorie intake,and importance of regular exercise ,referred to Comprehensive Weight Management            (F32.0) Mild major depression (H)  Plan: Worsened since before, increased buPROPion (WELLBUTRIN XL) to 300 MG 24 hr tablet daily as directed.explained clearly about the medication,insructions and side effects.  Recommended referral to therapy but patient would like to wait at this time            (K76.0) Hepatic steatosis  Plan: Reviewed recent liver enzymes, elevated ALT and AST, recommended low-fat diet regular exercise avoid Tylenol, alcohol. Rpt liver enzymes in 3 months.       (R93.5) Abnormal CT of the abdomen  Plan: CT abdomen 2021- ordered by Skylar Rodriguez showed calcification in the upper right hemiscrotum may be the epididymal head.  Discussed with patient and ordered US Testicular & Scrotum w Doppler Ltd        Review of the result(s) of each unique test - Lipid panel, CMP  Ordering of each unique test  Prescription drug management  41 minutes spent on the date of the encounter doing chart review, history and exam, documentation and further activities per the note   [id number]}      BMI:   Estimated body mass index is 40.22 kg/m  as calculated from the following:    Height as of this encounter: 1.765 m (5' 9.5\").    Weight as of this encounter: 125.3 kg " (276 lb 4.8 oz).   Weight management plan: Patient referred to endocrine and/or weight management specialty Discussed healthy diet and exercise guidelines      Return in about 4 weeks (around 4/17/2023) for Depression Follow up.    Yolanda Parikh MD  [facility] FRAN Bullock is a 42 year old presenting for the following health issues:  Hypertension, Lipids, and Depression      History of Present Illness       Mental Health Follow-up:  Patient presents to follow-up on Depression & Anxiety.Patient's depression since last visit has been:  Medium  The patient is not having other symptoms associated with depression.  Patient's anxiety since last visit has been:  Medium  The patient is not having other symptoms associated with anxiety.  Any significant life events: relationship concerns  Patient is not feeling anxious or having panic attacks.  Patient has no concerns about alcohol or drug use.    Hypertension: He presents for follow up of hypertension.  He does not check blood pressure  regularly outside of the clinic. Outside blood pressures have been over 140/90. He follows a low salt diet.     He eats 2-3 servings of fruits and vegetables daily.He consumes 1 sweetened beverage(s) daily.He exercises with enough effort to increase his heart rate 9 or less minutes per day.  He exercises with enough effort to increase his heart rate 3 or less days per week.   He is taking medications regularly.    Today's PHQ-9         PHQ-9 Total Score: 9    PHQ-9 Q9 Thoughts of better off dead/self-harm past 2 weeks :   Not at all    How difficult have these problems made it for you to do your work, take care of things at home, or get along with other people: Somewhat difficult  Today's MORIAH-7 Score: 9       Hyperlipidemia Follow-Up      Are you regularly taking any medication or supplement to lower your cholesterol?   No    Are you having muscle aches or other side effects that you think could  be caused by your cholesterol lowering medication?  No    Hypertension Follow-up      Do you check your blood pressure regularly outside of the clinic? Yes     Are you following a low salt diet? Yes    Are your blood pressures ever more than 140 on the top number (systolic) OR more   than 90 on the bottom number (diastolic), for example 140/90? No    Elevated liver enzymes; patient has decreased alcohol intake and drinks occasionally once a week or less, had CT abdomen in 2021   which showed hepatic steatosis      Depression Followup    How are you doing with your depression since your last visit? Worsened , patient states he lives alone and at times feels lonely, not in a relationship.  On Wellbutrin 150 mg 1 tablet daily    Are you having other symptoms that might be associated with depression? No    Have you had a significant life event?  No     Are you feeling anxious or having panic attacks?   No    Do you have any concerns with your use of alcohol or other drugs? No    PHQ 2/28/2022 9/8/2022 3/20/2023   PHQ-9 Total Score 4 3 9   Q9: Thoughts of better off dead/self-harm past 2 weeks Not at all Not at all Not at all     MORIAH-7 SCORE 8/17/2021 3/20/2023   Total Score - 9 (mild anxiety)   Total Score 1 9       Past Medical History:   Diagnosis Date     Hypertension      ROBB (obstructive sleep apnea), severe 2/27/2019     Severe obstructive sleep apnea        Current Outpatient Medications   Medication Sig Dispense Refill     buPROPion (WELLBUTRIN XL) 300 MG 24 hr tablet Take 1 tablet (300 mg) by mouth every morning 30 tablet 0     hydrochlorothiazide (HYDRODIURIL) 25 MG tablet Take 1 tablet (25 mg) by mouth daily 90 tablet 1     irbesartan (AVAPRO) 150 MG tablet Take 1 tablet (150 mg) by mouth At Bedtime 90 tablet 1     rosuvastatin (CRESTOR) 10 MG tablet Take 1 tablet (10 mg) by mouth daily 90 tablet 0       Review of Systems   CONSTITUTIONAL: NEGATIVE for fever, chills, change in weight  RESP: NEGATIVE for  "significant cough or SOB  CV: NEGATIVE for chest pain, palpitations or peripheral edema  MUSCULOSKELETAL: NEGATIVE for significant arthralgias or myalgia  PSYCHIATRIC: depressed mood      Objective    /84   Pulse 103   Temp 97.9  F (36.6  C) (Oral)   Resp 16   Ht 1.765 m (5' 9.5\")   Wt 125.3 kg (276 lb 4.8 oz)   SpO2 97%   BMI 40.22 kg/m    Body mass index is 40.22 kg/m .  Physical Exam   GENERAL:   alert and no distress  EYES: Eyes grossly normal to inspection, PERRL and conjunctivae and sclerae normal  RESP: lungs clear to auscultation - no rales, rhonchi or wheezes  CV: regular rate and rhythm, normal S1 S2,     MS:  no edema,no calf tenderness  NEURO: Normal strength and tone, mentation intact and speech normal  PSYCH: mentation appears normal, affect normal/bright               "

## 2023-04-02 ENCOUNTER — HEALTH MAINTENANCE LETTER (OUTPATIENT)
Age: 43
End: 2023-04-02

## 2023-04-12 DIAGNOSIS — F32.0 MILD MAJOR DEPRESSION (H): ICD-10-CM

## 2023-04-13 NOTE — TELEPHONE ENCOUNTER
Bupropion (Wellbutrin XL ) 150 mg tab  Routing refill request to provider for review/approval because:  Drug not active on patient's medication list      Discontinued 3/20/23 in dose adjustment.

## 2023-04-14 RX ORDER — BUPROPION HYDROCHLORIDE 150 MG/1
TABLET ORAL
Qty: 90 TABLET | Refills: 1 | OUTPATIENT
Start: 2023-04-14

## 2023-04-14 NOTE — TELEPHONE ENCOUNTER
wellbutrin was increased to 300 mg at last OV and was advised to f/u virtual visit on 04/17/23  , no appt has been made, pl advise pt top make virtual visit appt

## 2023-04-20 ENCOUNTER — VIRTUAL VISIT (OUTPATIENT)
Dept: INTERNAL MEDICINE | Facility: CLINIC | Age: 43
End: 2023-04-20
Payer: COMMERCIAL

## 2023-04-20 DIAGNOSIS — F32.0 MILD MAJOR DEPRESSION (H): ICD-10-CM

## 2023-04-20 PROCEDURE — 99213 OFFICE O/P EST LOW 20 MIN: CPT | Mod: VID | Performed by: INTERNAL MEDICINE

## 2023-04-20 RX ORDER — BUPROPION HYDROCHLORIDE 300 MG/1
300 TABLET ORAL EVERY MORNING
Qty: 90 TABLET | Refills: 1 | Status: SHIPPED | OUTPATIENT
Start: 2023-04-20 | End: 2023-10-23

## 2023-04-20 ASSESSMENT — ANXIETY QUESTIONNAIRES
IF YOU CHECKED OFF ANY PROBLEMS ON THIS QUESTIONNAIRE, HOW DIFFICULT HAVE THESE PROBLEMS MADE IT FOR YOU TO DO YOUR WORK, TAKE CARE OF THINGS AT HOME, OR GET ALONG WITH OTHER PEOPLE: SOMEWHAT DIFFICULT
6. BECOMING EASILY ANNOYED OR IRRITABLE: SEVERAL DAYS
3. WORRYING TOO MUCH ABOUT DIFFERENT THINGS: MORE THAN HALF THE DAYS
2. NOT BEING ABLE TO STOP OR CONTROL WORRYING: NOT AT ALL
GAD7 TOTAL SCORE: 6
5. BEING SO RESTLESS THAT IT IS HARD TO SIT STILL: SEVERAL DAYS
7. FEELING AFRAID AS IF SOMETHING AWFUL MIGHT HAPPEN: SEVERAL DAYS
GAD7 TOTAL SCORE: 6
1. FEELING NERVOUS, ANXIOUS, OR ON EDGE: SEVERAL DAYS

## 2023-04-20 ASSESSMENT — PATIENT HEALTH QUESTIONNAIRE - PHQ9
5. POOR APPETITE OR OVEREATING: NOT AT ALL
SUM OF ALL RESPONSES TO PHQ QUESTIONS 1-9: 1

## 2023-04-20 NOTE — PROGRESS NOTES
Kobe is a 42 year old who is being evaluated via a billable video visit.      How would you like to obtain your AVS? MyChart  If the video visit is dropped, the invitation should be resent by: Text to cell phone: [phone]  Will anyone else be joining your video visit? No          Assessment & Plan     (F32.0) Mild major depression (H)  Comment: Significant improvement in depression symptoms after increasing Wellbutrin dose  Plan: Refilled buPROPion (WELLBUTRIN XL) 300 MG 24 hr tablet as directed.explained clearly about the medication,insructions and side effects.  Patient states  he has not seen a therapist yet            Prescription drug management       Yolanda Parikh MD  [facility]   Kobe is a 42 year old, presenting for the following health issues:  Recheck Medication         View : No data to display.              HPI     Depression Followup    How are you doing with your depression since your last visit? Improved , Wellbutrin was increased to 300 mg at last office visit, tolerating well with good symptom control    Are you having other symptoms that might be associated with depression? No    Have you had a significant life event?  No     Are you feeling anxious or having panic attacks?   No    Do you have any concerns with your use of alcohol or other drugs? No        9/8/2022    11:16 AM 3/20/2023     2:59 PM 4/20/2023    11:20 AM   PHQ   PHQ-9 Total Score 3 9 1   Q9: Thoughts of better off dead/self-harm past 2 weeks Not at all Not at all Not at all            Past Medical History:   Diagnosis Date     Hypertension      ROBB (obstructive sleep apnea), severe 2/27/2019     Severe obstructive sleep apnea        Current Outpatient Medications   Medication Sig Dispense Refill     buPROPion (WELLBUTRIN XL) 300 MG 24 hr tablet Take 1 tablet (300 mg) by mouth every morning 90 tablet 1     hydrochlorothiazide (HYDRODIURIL) 25 MG tablet Take 1 tablet (25 mg) by  mouth daily 90 tablet 1     irbesartan (AVAPRO) 150 MG tablet Take 1 tablet (150 mg) by mouth At Bedtime 90 tablet 1     rosuvastatin (CRESTOR) 10 MG tablet Take 1 tablet (10 mg) by mouth daily 90 tablet 0           Review of Systems   CONSTITUTIONAL: NEGATIVE for fever, chills, change in weight  RESP: NEGATIVE for significant cough or SOB  CV: NEGATIVE for chest pain, palpitations or peripheral edema  PSYCHIATRIC: NEGATIVE for changes in mood or affect      Objective           Vitals:  No vitals were obtained today due to virtual visit.    Physical Exam   GENERAL: Healthy, alert and no distress  EYES: Eyes grossly normal to inspection.  No discharge or erythema, or obvious scleral/conjunctival abnormalities.  RESP: No audible wheeze, cough, or visible cyanosis.  No visible retractions or increased work of breathing.    PSYCH: Mentation appears normal, affect normal/bright, judgement and insight intact, normal speech and appearance well-groomed.       Video-Visit Details    Type of service:  Video Visit     Originating Location (pt. Location): Home    Distant Location (provider location):  On-site  Platform used for Video Visit: Curry

## 2023-09-17 DIAGNOSIS — I10 ESSENTIAL HYPERTENSION: ICD-10-CM

## 2023-09-18 RX ORDER — HYDROCHLOROTHIAZIDE 25 MG/1
25 TABLET ORAL DAILY
Qty: 90 TABLET | Refills: 0 | Status: SHIPPED | OUTPATIENT
Start: 2023-09-18 | End: 2023-12-22

## 2023-10-21 DIAGNOSIS — F32.0 MILD MAJOR DEPRESSION (H): ICD-10-CM

## 2023-10-23 RX ORDER — BUPROPION HYDROCHLORIDE 300 MG/1
300 TABLET ORAL EVERY MORNING
Qty: 90 TABLET | Refills: 0 | Status: SHIPPED | OUTPATIENT
Start: 2023-10-23 | End: 2024-01-08

## 2023-12-22 DIAGNOSIS — I10 ESSENTIAL HYPERTENSION: ICD-10-CM

## 2023-12-22 DIAGNOSIS — E78.2 MIXED HYPERLIPIDEMIA: ICD-10-CM

## 2023-12-22 RX ORDER — HYDROCHLOROTHIAZIDE 25 MG/1
25 TABLET ORAL DAILY
Qty: 90 TABLET | Refills: 0 | Status: SHIPPED | OUTPATIENT
Start: 2023-12-22 | End: 2024-01-08

## 2023-12-22 RX ORDER — ROSUVASTATIN CALCIUM 10 MG/1
10 TABLET, COATED ORAL DAILY
Qty: 90 TABLET | Refills: 1 | Status: SHIPPED | OUTPATIENT
Start: 2023-12-22 | End: 2024-06-10

## 2024-01-07 DIAGNOSIS — I10 ESSENTIAL HYPERTENSION: ICD-10-CM

## 2024-01-08 ENCOUNTER — OFFICE VISIT (OUTPATIENT)
Dept: INTERNAL MEDICINE | Facility: CLINIC | Age: 44
End: 2024-01-08
Payer: COMMERCIAL

## 2024-01-08 VITALS
OXYGEN SATURATION: 97 % | TEMPERATURE: 97.1 F | HEART RATE: 92 BPM | DIASTOLIC BLOOD PRESSURE: 88 MMHG | BODY MASS INDEX: 42.88 KG/M2 | RESPIRATION RATE: 16 BRPM | WEIGHT: 299.5 LBS | SYSTOLIC BLOOD PRESSURE: 136 MMHG | HEIGHT: 70 IN

## 2024-01-08 DIAGNOSIS — Z72.0 TOBACCO ABUSE DISORDER: ICD-10-CM

## 2024-01-08 DIAGNOSIS — E66.01 MORBID OBESITY (H): ICD-10-CM

## 2024-01-08 DIAGNOSIS — I10 ESSENTIAL HYPERTENSION: ICD-10-CM

## 2024-01-08 DIAGNOSIS — F32.0 MILD MAJOR DEPRESSION (H): ICD-10-CM

## 2024-01-08 DIAGNOSIS — Z00.00 ROUTINE HISTORY AND PHYSICAL EXAMINATION OF ADULT: Primary | ICD-10-CM

## 2024-01-08 DIAGNOSIS — E78.2 MIXED HYPERLIPIDEMIA: ICD-10-CM

## 2024-01-08 PROBLEM — F32.9 MAJOR DEPRESSION: Status: RESOLVED | Noted: 2020-10-20 | Resolved: 2024-01-08

## 2024-01-08 LAB
ALBUMIN SERPL BCG-MCNC: 4.2 G/DL (ref 3.5–5.2)
ALP SERPL-CCNC: 88 U/L (ref 40–150)
ALT SERPL W P-5'-P-CCNC: 54 U/L (ref 0–70)
ANION GAP SERPL CALCULATED.3IONS-SCNC: 9 MMOL/L (ref 7–15)
AST SERPL W P-5'-P-CCNC: 38 U/L (ref 0–45)
BILIRUB SERPL-MCNC: 0.4 MG/DL
BUN SERPL-MCNC: 18.4 MG/DL (ref 6–20)
CALCIUM SERPL-MCNC: 9.2 MG/DL (ref 8.6–10)
CHLORIDE SERPL-SCNC: 102 MMOL/L (ref 98–107)
CHOLEST SERPL-MCNC: 157 MG/DL
CREAT SERPL-MCNC: 1.17 MG/DL (ref 0.67–1.17)
DEPRECATED HCO3 PLAS-SCNC: 28 MMOL/L (ref 22–29)
EGFRCR SERPLBLD CKD-EPI 2021: 79 ML/MIN/1.73M2
FASTING STATUS PATIENT QL REPORTED: YES
GLUCOSE SERPL-MCNC: 112 MG/DL (ref 70–99)
HDLC SERPL-MCNC: 50 MG/DL
HGB BLD-MCNC: 15.3 G/DL (ref 13.3–17.7)
LDLC SERPL CALC-MCNC: 84 MG/DL
NONHDLC SERPL-MCNC: 107 MG/DL
POTASSIUM SERPL-SCNC: 4.3 MMOL/L (ref 3.4–5.3)
PROT SERPL-MCNC: 7.7 G/DL (ref 6.4–8.3)
PSA SERPL DL<=0.01 NG/ML-MCNC: 0.75 NG/ML (ref 0–2.5)
SODIUM SERPL-SCNC: 139 MMOL/L (ref 135–145)
TRIGL SERPL-MCNC: 115 MG/DL
TSH SERPL DL<=0.005 MIU/L-ACNC: 1.61 UIU/ML (ref 0.3–4.2)

## 2024-01-08 PROCEDURE — 80053 COMPREHEN METABOLIC PANEL: CPT | Performed by: INTERNAL MEDICINE

## 2024-01-08 PROCEDURE — 80061 LIPID PANEL: CPT | Performed by: INTERNAL MEDICINE

## 2024-01-08 PROCEDURE — 99396 PREV VISIT EST AGE 40-64: CPT | Performed by: INTERNAL MEDICINE

## 2024-01-08 PROCEDURE — 84443 ASSAY THYROID STIM HORMONE: CPT | Performed by: INTERNAL MEDICINE

## 2024-01-08 PROCEDURE — 85018 HEMOGLOBIN: CPT | Performed by: INTERNAL MEDICINE

## 2024-01-08 PROCEDURE — G0103 PSA SCREENING: HCPCS | Performed by: INTERNAL MEDICINE

## 2024-01-08 PROCEDURE — 99214 OFFICE O/P EST MOD 30 MIN: CPT | Mod: 25 | Performed by: INTERNAL MEDICINE

## 2024-01-08 PROCEDURE — 36415 COLL VENOUS BLD VENIPUNCTURE: CPT | Performed by: INTERNAL MEDICINE

## 2024-01-08 RX ORDER — HYDROCHLOROTHIAZIDE 25 MG/1
25 TABLET ORAL DAILY
Qty: 90 TABLET | Refills: 1 | Status: SHIPPED | OUTPATIENT
Start: 2024-01-08 | End: 2024-06-11

## 2024-01-08 RX ORDER — IRBESARTAN 150 MG/1
150 TABLET ORAL AT BEDTIME
Qty: 90 TABLET | Refills: 1 | Status: SHIPPED | OUTPATIENT
Start: 2024-01-08 | End: 2024-05-18

## 2024-01-08 RX ORDER — BUPROPION HYDROCHLORIDE 300 MG/1
300 TABLET ORAL EVERY MORNING
Qty: 90 TABLET | Refills: 1 | Status: SHIPPED | OUTPATIENT
Start: 2024-01-08 | End: 2024-07-11

## 2024-01-08 RX ORDER — IRBESARTAN 150 MG/1
150 TABLET ORAL AT BEDTIME
Qty: 90 TABLET | Refills: 1 | OUTPATIENT
Start: 2024-01-08

## 2024-01-08 ASSESSMENT — PATIENT HEALTH QUESTIONNAIRE - PHQ9: SUM OF ALL RESPONSES TO PHQ QUESTIONS 1-9: 2

## 2024-01-08 NOTE — PROGRESS NOTES
SUBJECTIVE:   Kobe is a 43 year old, presenting for the following:  Physical        1/8/2024     9:14 AM   Additional Questions   Roomed by jeff   Accompanied by self         1/8/2024     9:14 AM   Patient Reported Additional Medications   Patient reports taking the following new medications none       Healthy Habits:     Getting at least 3 servings of Calcium per day:  Yes    Bi-annual eye exam:  Yes    Dental care twice a year:  Yes    Sleep apnea or symptoms of sleep apnea:  None    Diet:  Regular (no restrictions)    Frequency of exercise:  None    Duration of exercise:  N/A    Taking medications regularly:  Yes    Barriers to taking medications:  None    Medication side effects:  None    Additional concerns today:  No         Hyperlipidemia Follow-Up    Are you regularly taking any medication or supplement to lower your cholesterol?   Yes- Crestor  Are you having muscle aches or other side effects that you think could be caused by your cholesterol lowering medication?  No    Hypertension Follow-up    Do you check your blood pressure regularly outside of the clinic? Yes   Are you following a low salt diet? Yes  Are your blood pressures ever more than 140 on the top number (systolic) OR more   than 90 on the bottom number (diastolic), for example 140/90? No    Depression Followup  How are you doing with your depression since your last visit? No change  Are you having other symptoms that might be associated with depression? No  Have you had a significant life event?  No   Are you feeling anxious or having panic attacks?   No  Do you have any concerns with your use of alcohol or other drugs? No           3/20/2023     2:59 PM 4/20/2023    11:20 AM 1/8/2024     9:21 AM   PHQ   PHQ-9 Total Score 9 1 2   Q9: Thoughts of better off dead/self-harm past 2 weeks Not at all Not at all Not at all       Past Medical History:   Diagnosis Date    Hypertension     ROBB (obstructive sleep apnea), severe 2/27/2019    Severe  "obstructive sleep apnea        Past Surgical History:   Procedure Laterality Date    DENTAL SURGERY      abscessed tooth removed under anesthesia    OPEN REDUCTION INTERNAL FIXATION RODDING INTRAMEDULLARY TIBIA Left 1/14/2018    Procedure: OPEN REDUCTION INTERNAL FIXATION RODDING INTRAMEDULLARY TIBIA;  Intramedullary nailing, left tibial shaft fracture;  Surgeon: Jian Burgos MD;  Location: [facility] OR       Current Outpatient Medications   Medication Sig Dispense Refill    buPROPion (WELLBUTRIN XL) 300 MG 24 hr tablet Take 1 tablet (300 mg) by mouth every morning 90 tablet 1    hydrochlorothiazide (HYDRODIURIL) 25 MG tablet Take 1 tablet (25 mg) by mouth daily 90 tablet 1    irbesartan (AVAPRO) 150 MG tablet Take 1 tablet (150 mg) by mouth at bedtime 90 tablet 1    rosuvastatin (CRESTOR) 10 MG tablet TAKE 1 TABLET(10 MG) BY MOUTH DAILY 90 tablet 1       Family History   Problem Relation Age of Onset    Depression Mother     Anxiety Disorder Mother     Unknown/Adopted Father     Unknown/Adopted Maternal Grandmother     Unknown/Adopted Maternal Grandfather     Unknown/Adopted Paternal Grandmother     Unknown/Adopted Paternal Grandfather     Unknown/Adopted Brother     Anxiety Disorder Sister     Sleep Apnea Sister          Social History     Tobacco Use    Smoking status: Every Day     Packs/day: 0.50     Years: 23.00     Additional pack years: 0.00     Total pack years: 11.50     Types: Cigarettes    Smokeless tobacco: Never    Tobacco comments:     smoking since age 18- cutting down, 5 cig /day    Substance Use Topics    Alcohol use: Yes     Comment: 2 drinks/day     Last PSA: No results found for: \"PSA\"    Reviewed orders with patient. Reviewed health maintenance and updated orders accordingly - Yes      Reviewed and updated as needed this visit by clinical staff   Tobacco   Meds              Reviewed and updated as needed this visit by Provider                     Review of Systems  CONSTITUTIONAL: " "NEGATIVE for fever, chills,    INTEGUMENTARY/SKIN: NEGATIVE for worrisome rashes, moles or lesions  EYES: NEGATIVE for vision changes or irritation  ENT: NEGATIVE for ear, mouth and throat problems  RESP: NEGATIVE for significant cough or SOB  CV: NEGATIVE for chest pain, palpitations or peripheral edema  GI: NEGATIVE for nausea, abdominal pain, heartburn, or change in bowel habits   male: negative for dysuria, hematuria, decreased urinary stream, erectile dysfunction, urethral discharge  MUSCULOSKELETAL: NEGATIVE for significant arthralgias or myalgia  NEURO: NEGATIVE for weakness, dizziness or paresthesias  PSYCHIATRIC: NEGATIVE for changes in mood or affect    OBJECTIVE:   /88   Pulse 92   Temp 97.1  F (36.2  C) (Tympanic)   Resp 16   Ht 1.765 m (5' 9.5\")   Wt 135.9 kg (299 lb 8 oz)   SpO2 97%   BMI 43.59 kg/m      Physical Exam  GENERAL: healthy, alert and no distress  EYES: Eyes grossly normal to inspection, PERRL and conjunctivae and sclerae normal  HENT: ear canals and TM's normal, nose and mouth without ulcers or lesions  RESP: lungs clear to auscultation - no rales, rhonchi or wheezes  CV: regular rate and rhythm, normal S1 S2,    ABDOMEN: soft, nontender,   and bowel sounds normal  MS: no gross musculoskeletal defects noted, no edema  NEURO: Normal strength and tone, mentation intact and speech normal  PSYCH: mentation appears normal, affect normal/bright    ASSESSMENT/PLAN:       (Z00.00) Routine history and physical examination of adult  (primary encounter diagnosis)  Plan: Hemoglobin, TSH with free T4 reflex,         Comprehensive metabolic panel, Prostate         Specific Antigen Screen            (E78.2) Mixed hyperlipidemia  Plan: On Crestor 10 mg daily, check lipid panel reflex to direct LDL Fasting            (F32.0) Mild major depression (H24)  Comment: Stable  Plan: buPROPion (WELLBUTRIN XL) 300 MG 24 hr tablet refilled as directed. explained clearly about the " "medication,insructions and side effects.       (I10) Essential hypertension  Comment: Blood pressure stable  Plan: hydrochlorothiazide (HYDRODIURIL) 25 MG tablet,        irbesartan (AVAPRO) 150 MG tablet refilled as directed.explained clearly about the medication,insructions and side effects.       (E66.01) Morbid obesity (H)  Plan:  Discussed in detail about Diet,calorie intake,and importance of regular exercise,       (Z72.0) Tobacco abuse disorder  Plan: Patient states that he is cutting down cigarette smoking ,currently smokes 5 cigarettes a day, counseled on smoking cessation, patient declined any medications at this time.  Patient states that he will do it on his own.      Patient has been advised of split billing requirements and indicates understanding: Yes      COUNSELING:   Reviewed preventive health counseling, as reflected in patient instructions       Regular exercise       Healthy diet/nutrition      BMI:   Estimated body mass index is 43.59 kg/m  as calculated from the following:    Height as of this encounter: 1.765 m (5' 9.5\").    Weight as of this encounter: 135.9 kg (299 lb 8 oz).   Weight management plan: Discussed healthy diet and exercise guidelines      He reports that he has been smoking cigarettes. He has a 11.5 pack-year smoking history. He has never used smokeless tobacco.  Nicotine/Tobacco Cessation Plan:   Information offered: Patient not interested at this time            Yolanda Parikh MD  [facility]  "

## 2024-01-08 NOTE — NURSING NOTE
"/88   Pulse 92   Temp 97.1  F (36.2  C) (Tympanic)   Resp 16   Ht 1.765 m (5' 9.5\")   Wt 135.9 kg (299 lb 8 oz)   SpO2 97%   BMI 43.59 kg/m      "

## 2024-01-09 NOTE — TELEPHONE ENCOUNTER
Patient informed via [employer] of primary care provider's message below.  See [employer] message 2/11/19.   127

## 2024-02-11 DIAGNOSIS — F32.0 MILD MAJOR DEPRESSION (H): ICD-10-CM

## 2024-02-12 RX ORDER — BUPROPION HYDROCHLORIDE 300 MG/1
300 TABLET ORAL EVERY MORNING
Qty: 90 TABLET | Refills: 1 | OUTPATIENT
Start: 2024-02-12

## 2024-04-22 ENCOUNTER — ANCILLARY PROCEDURE (OUTPATIENT)
Dept: GENERAL RADIOLOGY | Facility: CLINIC | Age: 44
End: 2024-04-22
Attending: FAMILY MEDICINE
Payer: COMMERCIAL

## 2024-04-22 ENCOUNTER — OFFICE VISIT (OUTPATIENT)
Dept: FAMILY MEDICINE | Facility: CLINIC | Age: 44
End: 2024-04-22
Payer: COMMERCIAL

## 2024-04-22 VITALS
SYSTOLIC BLOOD PRESSURE: 142 MMHG | DIASTOLIC BLOOD PRESSURE: 88 MMHG | OXYGEN SATURATION: 96 % | WEIGHT: 302 LBS | HEART RATE: 98 BPM | BODY MASS INDEX: 43.96 KG/M2

## 2024-04-22 DIAGNOSIS — M25.531 PAIN IN BOTH WRISTS: Primary | ICD-10-CM

## 2024-04-22 DIAGNOSIS — M25.532 PAIN IN BOTH WRISTS: ICD-10-CM

## 2024-04-22 DIAGNOSIS — M25.532 PAIN IN BOTH WRISTS: Primary | ICD-10-CM

## 2024-04-22 DIAGNOSIS — M25.531 PAIN IN BOTH WRISTS: ICD-10-CM

## 2024-04-22 PROCEDURE — 73110 X-RAY EXAM OF WRIST: CPT | Mod: TC | Performed by: RADIOLOGY

## 2024-04-22 PROCEDURE — 99203 OFFICE O/P NEW LOW 30 MIN: CPT | Performed by: FAMILY MEDICINE

## 2024-04-22 NOTE — LETTER
April 22, 2024      Kobe Flowers  [address] [address]        Dear ,    We are writing to inform you of your test results.  The official radiology interpretation is that both wrists are normal, that is there is no evidence of any arthritis or any other changes.  I did put a referral to see occupational therapy.  He would be due for a physical in the next 3 to 6 months.        Resulted Orders   XR Wrist Left G/E 3 Views    Narrative    EXAM: XR WRIST LEFT G/E 3 VIEWS, XR WRIST RIGHT G/E 3 VIEWS  LOCATION: [facility]  DATE: 4/22/2024    INDICATION: Over weeks of bilateral wrist pain, no injury or trauma  COMPARISON: None.      Impression    IMPRESSION: Normal joint spaces and alignment. No fracture on either side. Normal bone mineralization. No erosive change or evidence to suggest an inflammatory arthropathy.       If you have any questions or concerns, please call the clinic at the number listed above.       Sincerely,      Mckinley Bradford MD

## 2024-04-22 NOTE — PROGRESS NOTES
Assessment & Plan     (M25.531,  M25.532) Pain in both wrists  (primary encounter diagnosis)  Comment: Acute onset of bilateral wrist pain, I am more suspicious for a tendinitis, rather than a neuropathy though that is possible  Plan: XR Wrist Left G/E 3 Views, Occupational Therapy         Referral, CANCELED: XR Wrist         Bilateral G/E 3 Views             PLAN:  1.  X-rays of the right wrist and left wrist reviewed, normal alignment no other abnormality  2.  Occupational therapy referral  3.  Patient is due for a physical in the next 3 to 6 months.                  Subjective   Kobe is a 43 year old, presenting for the following health issues:  No chief complaint on file.      4/22/2024    11:02 AM   Additional Questions   Roomed by Meredith   Accompanied by self     History of Present Illness       Reason for visit:  Wrist pain      Patient comes in because both of his wrists hurt, this started a couple of weeks ago or so, this was not preceded by any injury trauma or any other change in his usual level of activity.  The right is probably a little bit worse than the left, he notices the pain more when he flexes or extends the wrist, sometimes not always there is some numbness as well.    Patient is not taking anything for this he does a lot of writing and typing sometimes notices some pain when he is doing either of these activities.                        Objective    There were no vitals taken for this visit.  There is no height or weight on file to calculate BMI.  Physical Exam   Wrist examination.  Of note I do not note any obvious swelling or deformity I do not reproduce pain or tenderness when I palpate over the wrist themselves.              Signed Electronically by: Mckinley Bradford MD

## 2024-06-09 DIAGNOSIS — E78.2 MIXED HYPERLIPIDEMIA: ICD-10-CM

## 2024-06-10 RX ORDER — ROSUVASTATIN CALCIUM 10 MG/1
10 TABLET, COATED ORAL DAILY
Qty: 90 TABLET | Refills: 1 | Status: SHIPPED | OUTPATIENT
Start: 2024-06-10

## 2024-06-11 ENCOUNTER — TELEPHONE (OUTPATIENT)
Dept: INTERNAL MEDICINE | Facility: CLINIC | Age: 44
End: 2024-06-11
Payer: COMMERCIAL

## 2024-06-11 DIAGNOSIS — I10 ESSENTIAL HYPERTENSION: ICD-10-CM

## 2024-06-11 RX ORDER — HYDROCHLOROTHIAZIDE 25 MG/1
25 TABLET ORAL DAILY
Qty: 30 TABLET | Refills: 0 | Status: SHIPPED | OUTPATIENT
Start: 2024-06-11 | End: 2024-07-11

## 2024-06-11 NOTE — TELEPHONE ENCOUNTER
Last seen 1/24, patient is due for office visit next month, medications refilled for 1 month, please schedule in clinic visit.  Okay to schedule on any of my same-day or approval only slot

## 2024-06-11 NOTE — PROGRESS NOTES
ASSESSMENT & PLAN    Kobe was seen today for pain and pain.    Diagnoses and all orders for this visit:    Chronic ankle pain, bilateral  -     XR Ankle Bilateral G/E 3 vw; Future  -     Physical Therapy  Referral; Future    Post-traumatic osteoarthritis of both ankles    Pes planus of both feet        Bilateral ankle pain and stiffness acute on chronic. History of ORIF right tib fib with hardware 2018 on the left with range of motion restrictions. Xrays showing hardware intact but does have degenerative osteoarthritis changes as well most likely also causing pain into foot and ankle. Does also have pes planus that could also be contributing to pain medial ankle pain.  Will recommend conservative management of ankle pain at this time  -Ankle strengthening with physical therapy  -Insoles for better arch support. Examples given in AVS  -Voltaren gel ( or also known as diclofenac gel) to area of pain up to three times per day as needed. Can get over the counter at drug store.   -Can consider injections to tib/fib if not improved or referral to podiatry given surgical history  Follow-up as needed after PT anticipate 4-6 weeks    Heather Sellers [facility]IC[facility] CLIN[facility]    -----  Chief Complaint   Patient presents with    Right Ankle - Pain    Left Ankle - Pain       SUBJECTIVE  Frank Flowers is a/an 44 year old male who is seen as a self referral for evaluation of bilateral ankle pain Left> Right .     The patient is seen by themselves.    Onset: 7 years(s) ago. Patient describes injury as fell and slipped at work 2018, broke tib fib on left. NO injury for the right  Location of Pain: bilateral medial ankles  Worsened by: worse at the end of the week or end of the day.   Better with: nothing yet  Treatments tried: no treatment tried to date  Associated symptoms: feeling of instability at times. Aching and sometimes sharp pains  Denies numbness, tingling,  locking  Orthopedic/Surgical history: YES - Date: January 14th 2018 by  - Left middle third Tib fib Fracture and hardware surgery.   Social History/Occupation: works at [employer]- on his feet all day.     Patient Active Problem List   Diagnosis    Essential hypertension    Mixed hyperlipidemia    Severe obstructive sleep apnea    Tobacco abuse disorder    Morbid obesity (H)    Mild major depression (H24)    Hepatic steatosis       Current Outpatient Medications   Medication Sig Dispense Refill    buPROPion (WELLBUTRIN XL) 300 MG 24 hr tablet Take 1 tablet (300 mg) by mouth every morning 90 tablet 1    hydrochlorothiazide (HYDRODIURIL) 25 MG tablet TAKE 1 TABLET(25 MG) BY MOUTH DAILY 30 tablet 0    irbesartan (AVAPRO) 150 MG tablet Take 1 tablet (150 mg) by mouth at bedtime 90 tablet 1    rosuvastatin (CRESTOR) 10 MG tablet TAKE 1 TABLET(10 MG) BY MOUTH DAILY 90 tablet 1       PMH, Medications and Allergies were reviewed and updated as needed.    REVIEW OF SYSTEMS:  10 point ROS is negative other than symptoms noted above in HPI      OBJECTIVE:  /78   Wt 135.6 kg (299 lb)   BMI 43.52 kg/m     General: healthy, alert and in no distress  Skin: previous surgical scars healed on left  CV: distal perfusion intact bl Lower ext  Resp: normal respiratory effort without conversational dyspnea   Psych: normal mood and affect  Gait: NORMAL  Neuro: Normal light sensory exam of bl lower extremity     BILATERAL ANKLE  Inspection:  Previous scars   Palpation:    Nontender  about the ATFL, CFL, PTFL, deltoid ligament, anterior tib-fib ligament, distal 3rd fibula shaft, and 5th metatarsal base. Remainder of bony and ligamentous landmarks are nontender.  Range of Motion:   Left:  Plantarflexion limited by tightness / dorsiflexion limited by tightness / inversion limited by tightness / eversion limited by tightness  Right: full plantar dorsiflexion  Strength:    Full  However more unstable left calf raise than right   Pes  planus      RADIOLOGY:  Final results and radiologist's interpretation, available in the [facility] health record.  Images were reviewed with the patient in the office today.    My personal interpretation of the performed imaging:   Ankle 3 views : 6/17/2024  No acute fracture or dislocation  Ankle mortise intact  Left hardware visualized  Left ankle with more pronounced degenerative changes about tibiotalar joint with narrowing compared to right  Await final radiology read           >45 minutes spent by me on the date of the encounter doing chart review, history and exam, documentation and further activities per the note         Disclaimer: This note consists of symbols derived from keyboarding, dictation and/or voice recognition software. As a result, there may be errors in the script that have gone undetected. Please consider this when interpreting information found in this chart.

## 2024-06-13 NOTE — TELEPHONE ENCOUNTER
Left message on cell/home voicemail asking patient to return call to clinic.  Please assist him in scheduling appt.  See below. HERNÁN Corrales R.N.

## 2024-06-17 ENCOUNTER — TELEPHONE (OUTPATIENT)
Dept: FAMILY MEDICINE | Facility: CLINIC | Age: 44
End: 2024-06-17

## 2024-06-17 ENCOUNTER — OFFICE VISIT (OUTPATIENT)
Dept: ORTHOPEDICS | Facility: CLINIC | Age: 44
End: 2024-06-17
Payer: COMMERCIAL

## 2024-06-17 ENCOUNTER — ANCILLARY PROCEDURE (OUTPATIENT)
Dept: GENERAL RADIOLOGY | Facility: CLINIC | Age: 44
End: 2024-06-17
Attending: STUDENT IN AN ORGANIZED HEALTH CARE EDUCATION/TRAINING PROGRAM
Payer: COMMERCIAL

## 2024-06-17 VITALS — SYSTOLIC BLOOD PRESSURE: 138 MMHG | WEIGHT: 299 LBS | BODY MASS INDEX: 43.52 KG/M2 | DIASTOLIC BLOOD PRESSURE: 78 MMHG

## 2024-06-17 DIAGNOSIS — M25.571 CHRONIC ANKLE PAIN, BILATERAL: ICD-10-CM

## 2024-06-17 DIAGNOSIS — M21.42 PES PLANUS OF BOTH FEET: ICD-10-CM

## 2024-06-17 DIAGNOSIS — M25.572 CHRONIC ANKLE PAIN, BILATERAL: ICD-10-CM

## 2024-06-17 DIAGNOSIS — M19.171 POST-TRAUMATIC OSTEOARTHRITIS OF BOTH ANKLES: ICD-10-CM

## 2024-06-17 DIAGNOSIS — G89.29 CHRONIC ANKLE PAIN, BILATERAL: ICD-10-CM

## 2024-06-17 DIAGNOSIS — M25.572 CHRONIC ANKLE PAIN, BILATERAL: Primary | ICD-10-CM

## 2024-06-17 DIAGNOSIS — M19.172 POST-TRAUMATIC OSTEOARTHRITIS OF BOTH ANKLES: ICD-10-CM

## 2024-06-17 DIAGNOSIS — M21.41 PES PLANUS OF BOTH FEET: ICD-10-CM

## 2024-06-17 DIAGNOSIS — M25.571 CHRONIC ANKLE PAIN, BILATERAL: Primary | ICD-10-CM

## 2024-06-17 DIAGNOSIS — G89.29 CHRONIC ANKLE PAIN, BILATERAL: Primary | ICD-10-CM

## 2024-06-17 PROCEDURE — 73610 X-RAY EXAM OF ANKLE: CPT | Mod: TC | Performed by: RADIOLOGY

## 2024-06-17 PROCEDURE — 99204 OFFICE O/P NEW MOD 45 MIN: CPT | Performed by: STUDENT IN AN ORGANIZED HEALTH CARE EDUCATION/TRAINING PROGRAM

## 2024-06-17 NOTE — LETTER
6/17/2024      Frank Flowers  [address]  [place] [address]      Dear Colleague,    Thank you for referring your patient, Frank Flowers, to the [facility] SPORTS MEDICINE [facility]. Please see a copy of my visit note below.    ASSESSMENT & PLAN    Kobe was seen today for pain and pain.    Diagnoses and all orders for this visit:    Chronic ankle pain, bilateral  -     XR Ankle Bilateral G/E 3 vw; Future  -     Physical Therapy  Referral; Future    Post-traumatic osteoarthritis of both ankles    Pes planus of both feet        Bilateral ankle pain and stiffness acute on chronic. History of ORIF right tib fib with hardware 2018 on the left with range of motion restrictions. Xrays showing hardware intact but does have degenerative osteoarthritis changes as well most likely also causing pain into foot and ankle. Does also have pes planus that could also be contributing to pain medial ankle pain.  Will recommend conservative management of ankle pain at this time  -Ankle strengthening with physical therapy  -Insoles for better arch support. Examples given in AVS  -Voltaren gel ( or also known as diclofenac gel) to area of pain up to three times per day as needed. Can get over the counter at drug store.   -Can consider injections to tib/fib if not improved or referral to podiatry given surgical history  Follow-up as needed after PT anticipate 4-6 weeks    Heather Sellers DO  [facility] SPORTS MEDICINE [facility]    -----  Chief Complaint   Patient presents with     Right Ankle - Pain     Left Ankle - Pain       SUBJECTIVE  Frank Flowers is a/an 44 year old male who is seen as a self referral for evaluation of bilateral ankle pain Left> Right .     The patient is seen by themselves.    Onset: 7 years(s) ago. Patient describes injury as fell and slipped at work 2018, broke tib fib on left. NO injury for the right  Location of Pain: bilateral medial  ankles  Worsened by: worse at the end of the week or end of the day.   Better with: nothing yet  Treatments tried: no treatment tried to date  Associated symptoms: feeling of instability at times. Aching and sometimes sharp pains  Denies numbness, tingling, locking  Orthopedic/Surgical history: YES - Date: January 14th 2018 by  - Left middle third Tib fib Fracture and hardware surgery.   Social History/Occupation: works at [employer]- on his feet all day.     Patient Active Problem List   Diagnosis     Essential hypertension     Mixed hyperlipidemia     Severe obstructive sleep apnea     Tobacco abuse disorder     Morbid obesity (H)     Mild major depression (H24)     Hepatic steatosis       Current Outpatient Medications   Medication Sig Dispense Refill     buPROPion (WELLBUTRIN XL) 300 MG 24 hr tablet Take 1 tablet (300 mg) by mouth every morning 90 tablet 1     hydrochlorothiazide (HYDRODIURIL) 25 MG tablet TAKE 1 TABLET(25 MG) BY MOUTH DAILY 30 tablet 0     irbesartan (AVAPRO) 150 MG tablet Take 1 tablet (150 mg) by mouth at bedtime 90 tablet 1     rosuvastatin (CRESTOR) 10 MG tablet TAKE 1 TABLET(10 MG) BY MOUTH DAILY 90 tablet 1       PMH, Medications and Allergies were reviewed and updated as needed.    REVIEW OF SYSTEMS:  10 point ROS is negative other than symptoms noted above in HPI      OBJECTIVE:  /78   Wt 135.6 kg (299 lb)   BMI 43.52 kg/m     General: healthy, alert and in no distress  Skin: previous surgical scars healed on left  CV: distal perfusion intact bl Lower ext  Resp: normal respiratory effort without conversational dyspnea   Psych: normal mood and affect  Gait: NORMAL  Neuro: Normal light sensory exam of bl lower extremity     BILATERAL ANKLE  Inspection:  Previous scars   Palpation:    Nontender  about the ATFL, CFL, PTFL, deltoid ligament, anterior tib-fib ligament, distal 3rd fibula shaft, and 5th metatarsal base. Remainder of bony and ligamentous landmarks are nontender.  Range  of Motion:   Left:  Plantarflexion limited by tightness / dorsiflexion limited by tightness / inversion limited by tightness / eversion limited by tightness  Right: full plantar dorsiflexion  Strength:    Full  However more unstable left calf raise than right   Pes planus      RADIOLOGY:  Final results and radiologist's interpretation, available in the [facility] health record.  Images were reviewed with the patient in the office today.    My personal interpretation of the performed imaging:   Ankle 3 views : 6/17/2024  No acute fracture or dislocation  Ankle mortise intact  Left hardware visualized  Left ankle with more pronounced degenerative changes about tibiotalar joint with narrowing compared to right  Await final radiology read           >45 minutes spent by me on the date of the encounter doing chart review, history and exam, documentation and further activities per the note         Disclaimer: This note consists of symbols derived from keyboarding, dictation and/or voice recognition software. As a result, there may be errors in the script that have gone undetected. Please consider this when interpreting information found in this chart.       Again, thank you for allowing me to participate in the care of your patient.        Sincerely,        Heather Sellers, DO

## 2024-06-17 NOTE — TELEPHONE ENCOUNTER
Please check with patient who is his PCP? , last seen in clinic 1/24 for physical, patient is due for follow-up appointment in 07/24 .

## 2024-06-17 NOTE — TELEPHONE ENCOUNTER
Called and spoke with patient. He will now be seeing a Dr in [place] as it is closer to home. Patient is aware he needs to  request refills from his new PCP

## 2024-06-17 NOTE — PATIENT INSTRUCTIONS
1. Chronic ankle pain, bilateral    2. Pes planus of both feet        Bilateral ankle pain and stiffness acute on chronic  History of ORIF right tib fib with hardware 2017 on the left with range of motion restrictions.   Xrays showing hardware intact but does have degenerative osteoarthritis changes as well most likely also causing pain into foot and ankle.   Will recommend conservative management of ankle pain at this time  Ankle strengthening with physical therapy  Insoles for better arch support. Examples given below.   Voltaren gel ( or also known as diclofenac gel) to area of pain up to three times per day as needed. Can get over the counter at drug store.   Can consider injections to tib/fib if not improved or referral to podiatry given surgical history  Follow-up as needed after PT anticipate 4-6 weeks        OVER THE COUNTER INSERTS    Most of these can be found at your local [employer], [employer], sporting [employer], or online:    [email or web address]   Sofsole Fit SpeXmybox   Power Step   Walk-Fit ([employer])  *For heel pain* Arch Cradles  *For heel pain*       ** A good high quality over the counter insert should cost around $40-$50       Please call [phone]  Ask for my team if you have any questions or concerns    Heather Sellers DO  [facility]      Thank you for choosing [facility]!    CLINIC LOCATIONS:     [place]  TRIAGE LINE: [phone] [address] [facility] APPOINTMENTS: [phone]   [place] [address] RADIOLOGY: [phone]   (Monday, Thursday & Friday) PHYSICAL THERAPY: [phone]    BILLING QUESTIONS: [phone]   [place] FAX: [phone] [address]    [place] [address]    (Wednesday)

## 2024-06-18 NOTE — TELEPHONE ENCOUNTER
Called and spoke with patient 6- this is what was documented at that conversation.         6/17/24  2:13 PM  Note  Called and spoke with patient. He will now be seeing a Dr in [place] as it is closer to home. Patient is aware he needs to  request refills from his new PCP

## 2024-07-11 ENCOUNTER — VIRTUAL VISIT (OUTPATIENT)
Dept: FAMILY MEDICINE | Facility: CLINIC | Age: 44
End: 2024-07-11
Payer: COMMERCIAL

## 2024-07-11 DIAGNOSIS — I10 ESSENTIAL HYPERTENSION: ICD-10-CM

## 2024-07-11 DIAGNOSIS — F32.0 MILD MAJOR DEPRESSION (H): ICD-10-CM

## 2024-07-11 DIAGNOSIS — Z12.11 SCREEN FOR COLON CANCER: Primary | ICD-10-CM

## 2024-07-11 PROCEDURE — 99213 OFFICE O/P EST LOW 20 MIN: CPT | Mod: 95 | Performed by: FAMILY MEDICINE

## 2024-07-11 RX ORDER — HYDROCHLOROTHIAZIDE 25 MG/1
25 TABLET ORAL DAILY
Qty: 90 TABLET | Refills: 1 | Status: SHIPPED | OUTPATIENT
Start: 2024-07-11

## 2024-07-11 RX ORDER — BUPROPION HYDROCHLORIDE 300 MG/1
300 TABLET ORAL EVERY MORNING
Qty: 90 TABLET | Refills: 1 | Status: SHIPPED | OUTPATIENT
Start: 2024-07-11

## 2024-07-11 NOTE — PROGRESS NOTES
"Kobe is a 44 year old who is being evaluated via a billable video visit.    How would you like to obtain your AVS? MyChart  If the video visit is dropped, the invitation should be resent by: Text to cell phone: [phone]  Will anyone else be joining your video visit? No      Assessment & Plan     (Z12.11) Screen for colon cancer  (primary encounter diagnosis)  Comment: Patient will need screening  Plan:      (I10) Essential hypertension  Comment: On hydrochlorothiazide and Avapro  Plan: hydrochlorothiazide (HYDRODIURIL) 25 MG tablet,        PRIMARY CARE FOLLOW-UP SCHEDULING             (F32.0) Mild major depression (H24)  Comment: Wellbutrin has been helping  Plan: buPROPion (WELLBUTRIN XL) 300 MG 24 hr tablet             PLAN:  1.  Medications renewed as above without changes  2.  Patient is due for a physical in 6 months          BMI  Estimated body mass index is 43.52 kg/m  as calculated from the following:    Height as of 1/8/24: 1.765 m (5' 9.5\").    Weight as of 6/17/24: 135.6 kg (299 lb).   Weight management plan: Discussed healthy diet and exercise guidelines          Subjective   Kobe is a 44 year old, presenting for the following health issues:  Recheck Medication (refills)        7/11/2024    10:53 AM   Additional Questions   Roomed by Katie SANDOVAL CMA     HPI   Patient has a video call because essentially he needs some medications renewed.  The patient has a history of underlying high blood pressure he is on Avapro and hydrochlorothiazide for that he needs the chlorothiazide renewed.    Patient has a history of major depression he is on Wellbutrin he needs that renewed as well.    Patient has done well with the medications that he is on.  His last physical was in January, therefore he would be due for a physical next January.    Patient reports otherwise no other significant change in his health status                      Objective           Vitals:  No vitals were obtained today due to virtual " visit.    Physical Exam   GENERAL: alert and no distress  EYES: Eyes grossly normal to inspection.  No discharge or erythema, or obvious scleral/conjunctival abnormalities.  RESP: No audible wheeze, cough, or visible cyanosis.    SKIN: Visible skin clear. No significant rash, abnormal pigmentation or lesions.  NEURO: Cranial nerves grossly intact.  Mentation and speech appropriate for age.  PSYCH: Appropriate affect, tone, and pace of words          Video-Visit Details    Type of service:  Video Visit   Originating Location (pt. Location): Home    Distant Location (provider location):  On-site  Platform used for Video Visit: [employer]lia  Signed Electronically by: Mckinley Bradford MD

## 2024-08-05 NOTE — PROGRESS NOTES
ASSESSMENT & PLAN    Diagnoses and all orders for this visit:    Pain and swelling of right elbow  -     XR Forearm RT 2 vw; Future  -     XR Elbow RT G/E 3 vw; Future  -     Physical Therapy  Referral; Future      Pain in forearm and elbow with associated wrist and elbow weakness s/p hitting arm into metal ice machine at work 7/6/2024. Greater than one month s/p injury. Xrays overall reassuring without acute fracture. Possibly bony contusion causing pain.  Mainly weakness due to pain at this time but can continue to monitor if progresses and consider further imaging. Has full range of motion and neurologically intact less likely acute tear. Will continue to monitor symptoms and start with tylenol/ibuprofen as needed for pain control to start as well as PT for strengthening. Difficulty lifting more than 10lbs without pain. Will give lifting restrictions for work and follow-up in one month and will hopefully be able to increase restrictions with the help of PT.       Heather Sellers DO  [facility] SPORTS MEDICINE CLINIC [facility]    Greater than 30 minutes spent by me on the date of the encounter doing chart review, review of outside records, review of test results, interpretation of tests, patient visit, and documentation . If procedure performed, this was separate from time with procedure.       -----  Chief Complaint   Patient presents with    Right Elbow - Pain       SUBJECTIVE  Frank Flowers is a/an 44 year old male who is seen for righ forearm and elbow pain 7/6/2024.     The patient is Right handed    Onset: 7/6/24 month(s) ago. Patient describes injury as at work slipped and hit arm into metal ice machine.   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    Patient Active Problem List   Diagnosis    Essential hypertension    Mixed hyperlipidemia    Severe obstructive sleep apnea    Tobacco abuse disorder    Morbid obesity (H)    Mild major depression (H24)    Hepatic steatosis       Current Outpatient Medications   Medication Sig Dispense Refill    buPROPion (WELLBUTRIN XL) 300 MG 24 hr tablet Take 1 tablet (300 mg) by mouth every morning 90 tablet 1    hydrochlorothiazide (HYDRODIURIL) 25 MG tablet Take 1 tablet (25 mg) by mouth daily 90 tablet 1    irbesartan (AVAPRO) 150 MG tablet Take 1 tablet (150 mg) by mouth at bedtime 90 tablet 1    rosuvastatin (CRESTOR) 10 MG tablet TAKE 1 TABLET(10 MG) BY MOUTH DAILY 90 tablet 1       PMH, Medications and Allergies were reviewed and updated as needed.    REVIEW OF SYSTEMS:  10 point ROS is negative other than symptoms noted above in HPI        OBJECTIVE:  There were no vitals taken for this visit.   General: healthy, alert and in no distress  Skin: no suspicious lesions or rash.  CV: distal perfusion intact RUe  Resp: normal respiratory effort without conversational dyspnea   Psych: normal mood and affect  Gait: NORMAL  Neuro: Normal light sensory exam of RU extremity     RIGHT ELBOW  Inspection:    No swelling, bruising, discoloration, or obvious deformity or asymmetry  Palpation:    Tender about the medial and lateral epicondyle, mid radius forarm . Remainder of bony, ligamentous and tendinous landmarks are nontender.    Crepitus is Absent  Range of Motion:     Extension full / flexion full / pronation full / supination full but all motions painful  Strength:    Flexion limited by pain 4/5 extension limited by pain pronation limited by pain supination limited by pain 4/5 in all planes         RADIOLOGY:  Final results and radiologist's interpretation, available in the [facility] health record.  Images were reviewed with the patient in the office today.    My personal  interpretation of the performed imaging:   right elbow 3 views: 8/12/2024  No acute fracture or dislocation. Normal alignment  No joint effusion  Await final xr read.                    Disclaimer: This note consists of symbols derived from keyboarding, dictation and/or voice recognition software. As a result, there may be errors in the script that have gone undetected. Please consider this when interpreting information found in this chart.

## 2024-08-12 ENCOUNTER — ANCILLARY PROCEDURE (OUTPATIENT)
Dept: GENERAL RADIOLOGY | Facility: CLINIC | Age: 44
End: 2024-08-12
Attending: STUDENT IN AN ORGANIZED HEALTH CARE EDUCATION/TRAINING PROGRAM
Payer: COMMERCIAL

## 2024-08-12 ENCOUNTER — OFFICE VISIT (OUTPATIENT)
Dept: ORTHOPEDICS | Facility: CLINIC | Age: 44
End: 2024-08-12
Payer: OTHER MISCELLANEOUS

## 2024-08-12 DIAGNOSIS — M25.521 PAIN AND SWELLING OF RIGHT ELBOW: Primary | ICD-10-CM

## 2024-08-12 DIAGNOSIS — M25.421 PAIN AND SWELLING OF RIGHT ELBOW: Primary | ICD-10-CM

## 2024-08-12 DIAGNOSIS — M25.521 PAIN AND SWELLING OF RIGHT ELBOW: ICD-10-CM

## 2024-08-12 DIAGNOSIS — M25.421 PAIN AND SWELLING OF RIGHT ELBOW: ICD-10-CM

## 2024-08-12 PROCEDURE — 99214 OFFICE O/P EST MOD 30 MIN: CPT | Performed by: STUDENT IN AN ORGANIZED HEALTH CARE EDUCATION/TRAINING PROGRAM

## 2024-08-12 PROCEDURE — 73090 X-RAY EXAM OF FOREARM: CPT | Mod: TC | Performed by: RADIOLOGY

## 2024-08-12 PROCEDURE — 73080 X-RAY EXAM OF ELBOW: CPT | Mod: TC | Performed by: RADIOLOGY

## 2024-08-12 NOTE — LETTER
8/12/2024      Frank Flowers  [address] [place] [address]      Dear Colleague,    Thank you for referring your patient, Frank Flowers, to the [facility]. Please see a copy of my visit note below.    ASSESSMENT & PLAN    Diagnoses and all orders for this visit:    Pain and swelling of right elbow  -     XR Forearm RT 2 vw; Future  -     XR Elbow RT G/E 3 vw; Future  -     Physical Therapy  Referral; Future      Pain in forearm and elbow with associated wrist and elbow weakness s/p hitting arm into metal ice machine at work 7/6/2024. Greater than one month s/p injury. Xrays overall reassuring without acute fracture. Possibly bony contusion causing pain.  Mainly weakness due to pain at this time but can continue to monitor if progresses and consider further imaging. Has full range of motion and neurologically intact less likely acute tear. Will continue to monitor symptoms and start with tylenol/ibuprofen as needed for pain control to start as well as PT for strengthening. Difficulty lifting more than 10lbs without pain. Will give lifting restrictions for work and follow-up in one month and will hopefully be able to increase restrictions with the help of PT.       Heather Sellers DO  [facility]    Greater than 30 minutes spent by me on the date of the encounter doing chart review, review of outside records, review of test results, interpretation of tests, patient visit, and documentation . If procedure performed, this was separate from time with procedure.       -----  Chief Complaint   Patient presents with     Right Elbow - Pain       SUBJECTIVE  Frank Flowers is a/an 44 year old male who is seen for righ forearm and elbow pain 7/6/2024.     The patient is Right handed    Onset: 7/6/24 month(s) ago. Patient describes injury as at work slipped and hit arm into metal ice machine.   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    Patient Active Problem List   Diagnosis     Essential hypertension     Mixed hyperlipidemia     Severe obstructive sleep apnea     Tobacco abuse disorder     Morbid obesity (H)     Mild major depression (H24)     Hepatic steatosis       Current Outpatient Medications   Medication Sig Dispense Refill     buPROPion (WELLBUTRIN XL) 300 MG 24 hr tablet Take 1 tablet (300 mg) by mouth every morning 90 tablet 1     hydrochlorothiazide (HYDRODIURIL) 25 MG tablet Take 1 tablet (25 mg) by mouth daily 90 tablet 1     irbesartan (AVAPRO) 150 MG tablet Take 1 tablet (150 mg) by mouth at bedtime 90 tablet 1     rosuvastatin (CRESTOR) 10 MG tablet TAKE 1 TABLET(10 MG) BY MOUTH DAILY 90 tablet 1       PMH, Medications and Allergies were reviewed and updated as needed.    REVIEW OF SYSTEMS:  10 point ROS is negative other than symptoms noted above in HPI        OBJECTIVE:  There were no vitals taken for this visit.   General: healthy, alert and in no distress  Skin: no suspicious lesions or rash.  CV: distal perfusion intact RUe  Resp: normal respiratory effort without conversational dyspnea   Psych: normal mood and affect  Gait: NORMAL  Neuro: Normal light sensory exam of RU extremity     RIGHT ELBOW  Inspection:    No swelling, bruising, discoloration, or obvious deformity or asymmetry  Palpation:    Tender about the medial and lateral epicondyle, mid radius forarm . Remainder of bony, ligamentous and tendinous landmarks are nontender.    Crepitus is Absent  Range of Motion:     Extension full / flexion full / pronation full / supination full but all motions painful  Strength:    Flexion limited by pain 4/5 extension  limited by pain pronation limited by pain supination limited by pain 4/5 in all planes         RADIOLOGY:  Final results and radiologist's interpretation, available in the [facility] health record.  Images were reviewed with the patient in the office today.    My personal interpretation of the performed imaging:   right elbow 3 views: 8/12/2024  No acute fracture or dislocation. Normal alignment  No joint effusion  Await final xr read.                    Disclaimer: This note consists of symbols derived from keyboarding, dictation and/or voice recognition software. As a result, there may be errors in the script that have gone undetected. Please consider this when interpreting information found in this chart.       Again, thank you for allowing me to participate in the care of your patient.        Sincerely,        Heather Sellers, DO

## 2024-08-12 NOTE — PATIENT INSTRUCTIONS
No diagnosis found.    - Arm pain after trauma from hitting machine  -Strength diminished due to pain but will continue to monitor and if worsens can consider further imaging in the future  -Bony contusion most likely on Xray but will await final radiology read  -Tylenol 500-1000mg (up to three times per day) and ibuprofen 600mg (up to three times per day) as needed for pain and swelling. Always take ibuprofen with food.    -Physical therapy ordered. Call to schedule. Number to call in AVS.   -Will do lifting restrictions and given note for work for one month then will follow-up.       Please call [phone]  Ask for my team if you have any questions or concerns    Heather Sellers DO  [facility]      Thank you for choosing [facility]!    CLINIC LOCATIONS:     [place]  TRIAGE LINE: [phone] [address] [facility] [facility] APPOINTMENTS: [phone]   [place] [address] RADIOLOGY: [phone]   (Monday, Thursday & Friday) PHYSICAL THERAPY: [phone]    BILLING QUESTIONS: [phone]   [place] FAX: [phone] [address]    [place] [address]    (Wednesday)

## 2024-08-12 NOTE — LETTER
August 12, 2024      Frank Flowers  [address]  [place] [address]        To Whom It May Concern:    Frank Flowers was seen in our clinic. He may return to work with the following: limited to light duty - lifting no greater than 10 pounds, no pushing greater than 10lbs, no pulling greater than 10lbs unitl seen for follow-up around 9/13/2024.     Sincerely,      Heather Sellers

## 2024-08-15 ENCOUNTER — THERAPY VISIT (OUTPATIENT)
Dept: PHYSICAL THERAPY | Facility: REHABILITATION | Age: 44
End: 2024-08-15
Attending: STUDENT IN AN ORGANIZED HEALTH CARE EDUCATION/TRAINING PROGRAM
Payer: OTHER MISCELLANEOUS

## 2024-08-15 DIAGNOSIS — M25.521 PAIN AND SWELLING OF RIGHT ELBOW: ICD-10-CM

## 2024-08-15 DIAGNOSIS — M25.421 PAIN AND SWELLING OF RIGHT ELBOW: ICD-10-CM

## 2024-08-15 PROCEDURE — 97110 THERAPEUTIC EXERCISES: CPT | Mod: GP

## 2024-08-15 PROCEDURE — 97161 PT EVAL LOW COMPLEX 20 MIN: CPT | Mod: GP

## 2024-08-15 ASSESSMENT — ACTIVITIES OF DAILY LIVING (ADL)
PUSHING_UP_ON_YOUR_HANDS: MODERATE DIFFICULTY
USING_TOOLS_OR_APPLIANCES: A LITTLE BIT OF DIFFICULTY
WASHING_YOUR_HAIR_OR_SCALP: A LITTLE BIT OF DIFFICULTY
CARRYING_A_SMALL_SUITCASE_WITH_YOUR_AFFECTED_LIMB: QUITE A BIT OF DIFFICULTY
TYING_OR_LACING_SHOES: MODERATE DIFFICULTY
CLEANING: A LITTLE BIT OF DIFFICULTY
VACUUMING,_SWEEPING,_OR_RAKING: MODERATE DIFFICULTY
LAUNDERING_CLOTHES: NO DIFFICULTY
YOUR_USUAL_HOBBIES,_RECREATIONAL_OR_SPORTING_ACTIVITIES: MODERATE DIFFICULTY
PREPARING_FOOD: A LITTLE BIT OF DIFFICULTY
DRESS: MODERATE DIFFICULTY
UEFI_TOTAL_SCORE/80: .63
DOING_UP_BUTTONS: NO DIFFICULTY
OPENING_DOORS: NO DIFFICULTY
LIFTING_A_BAG_OF_GROCERIES_TO_WAIST_LEVEL: MODERATE DIFFICULTY
THROWING_A_BALL: MODERATE DIFFICULTY
UEFI_TOTAL_SCORE: 50
PLACING_AN_OBJECT_ONTO,_OR_REMOVING_IT_FROM_AN_OVERHEAD_SHELF: MODERATE DIFFICULTY
ANY_OF_YOUR_USUAL_WORK,_HOUSEWORK_OR_SCHOOL_ACTIVITIES: QUITE A BIT OF DIFFICULTY
PLEASE_INDICATE_YOR_PRIMARY_REASON_FOR_REFERRAL_TO_THERAPY:: ELBOW
OPENING_A_JAR: MODERATE DIFFICULTY
SLEEPING: A LITTLE BIT OF DIFFICULTY
DRIVING: NO DIFFICULTY

## 2024-08-15 NOTE — PROGRESS NOTES
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 Ever since then he has had pain in the forearm and in the elbow as well. X-rays were unremarkable. Overall, he reports slight worsening of symptoms since initial onset. He has been protecting the right arm, and he feels like the inactivity has contributed to the increased pain and weakness. He now has difficulty lifting objects over 10 pounds due to pain. He denies any numbness or tingling. Worst pain: 6-7/10. Best pain: 0-1/10. Current pain: 0-1/10. He reports any use of the right upper extremity (he is right-handed) as aggravating, especially at work. He has not tried relieving factors, specifically denying ice, heat, and over-the-counter medications.  Presenting condition or subjective complaint: pain and loss of strength in right arm  Date of onset: 07/06/24    Relevant medical history: Bladder or bowel problems; Depression; Heart problems; High blood pressure; History of fractures; Osteoarthritis; Overweight; Sleep disorder like apnea; Smoking   Dates & types of surgery:      Prior diagnostic imaging/testing results: X-ray     Prior therapy history for the same diagnosis, illness or injury:        Prior Level of Function  Transfers: Independent  Ambulation: Independent  ADL: Independent  IADL:  independent    Living Environment  Social support: Alone   Type of home: House; Multi-level; Basement   Stairs to enter the home: Yes       Ramp: No   Stairs inside the home: Yes 16 Is there a railing: Yes     Help at home: Self Cares (home health  aide/personal care attendant, family, etc)  Equipment owned:       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  Hobbies/Interests: tripJane    Patient goals for therapy: use right arm normally    Pain assessment:  see subjective report     Objective   PAIN: see subjective report  INTEGUMENTARY (edema, incisions):   POSTURE:   ROM:   (Degrees) Left AROM Left PROM Right AROM Right PROM   Elbow Flexion WNL  WNL    Elbow Extension WNL  Min limited compared to left    Elbow Hyperextension       Wrist Flexion WNL  WNL    Wrist Extension WNL  WNL    Supination WNL  Min limited compared to left    Pronation WNL  Mod limited compared to left    Radial Deviation WNL  WNL    Ulnar Deviation WNL  WNL                                - Painful with all elbow end-range AROM and wrist extension AROM  STRENGTH:   Pain: - none + mild ++ moderate +++ severe  Strength Scale: 0-5/5 Left Right   Elbow Flexion 5 4+, + (mild)   Elbow Extension 5 4-, + (mild)   Wrist Flexion     Wrist Extension 5 3+, ++ (mod)   Supination 5 3+, ++ (mod)   Pronation 5 4, + (mild)   Ulnar Deviation     Radial Deviation      Strength (lbs)     Lateral Pinch (lbs)     3 Point Pinch (lbs)          Hand Dominance: Right  FLEXIBILITY:   SPECIAL TESTS:    Left Right   Valgus 0 Negative  Negative    Valgus 30 Negative  Negative    Moving Valgus Stress     Varus Stress     Elbow Flexion Test     Tinel's Cubital Tunnel     Tinel's Carpal Tunnel     Tinel's Guyon's Canal     Tinel's Radial Nerve Pin     Tinel's Radial Nerve SBRN     Phalen's Negative  Negative    Resisted Long Finger  Positive   Finkelstein     CMC Grind Test     Froments Sign     ULTT: Medial Nerve Bias     ULTT: Ulnar Nerve Bias     ULTT: Radial Nerve Bias     Ulnar Nerve Compression     Axial Load of Thumb     Scaphoid Shift Test     Carpal Compression     TFCC Lift Test     TFCC Load Test            PALPATION:  Tender to palpation along proximal 2/3 of wrist extensors  JOINT MOBILITY:    CERVICAL SCREEN:  min-mod limited in cervical extension, rotation, and side-bending, but all pain-free; flexion WFL  THORACIC SCREEN:   SHOULDER SCREEN:   Assessment & Plan   CLINICAL IMPRESSIONS  Medical Diagnosis: Pain and swelling of right elbow    Treatment Diagnosis: Left elbow pain with mobility and strength deficits   Impression/Assessment: Patient is a 44 year old male with subacute right elbow and forearm pain complaints.  The following significant findings have been identified: Pain, Decreased ROM/flexibility, Decreased strength, Impaired muscle performance, and Decreased activity tolerance. These impairments interfere with their ability to perform self care tasks, work tasks, recreational activities, and household chores as compared to previous level of function.     Clinical Decision Making (Complexity):  Clinical Presentation: Evolving/Changing  Clinical Presentation Rationale: based on medical and personal factors listed in PT evaluation  Clinical Decision Making (Complexity): Low complexity    PLAN OF CARE  Treatment Interventions:  Modalities: Cryotherapy, Dry Needling, E-stim, Hot Pack, Ultrasound  Interventions: Manual Therapy, Neuromuscular Re-education, Therapeutic Activity, Therapeutic Exercise, Self-Care/Home Management    Long Term Goals     PT Goal 1  Goal Identifier: HEP  Goal Description: Kobe will demonstrate mastery of ([id number] need for cueing) and compliance with (completion at least 5/7 days/week) his home exercise program to facilitate increased rate of improvement.  Goal Progress: In progress  Target Date: 09/12/24  PT Goal 2  Goal Identifier: Upper Extremity Functional Index (UEFI)  Goal Description: Kobe will demonstrate significantly improved function as evidenced by an improved UEFI score of 65/80.  Goal Progress: 51/80  Target Date: 10/10/24  PT Goal 3  Goal Identifier: Work  Goal Description: Right will demonstrate significantly improved tolerance to work as evidenced by  his ability to perform all required work tasks/functions with self-report elbow pain no higher than 2/10.  Goal Progress: Unable  Target Date: 10/10/24      Frequency of Treatment: 1 time per week  Duration of Treatment: 8 weeks    Recommended Referrals to Other Professionals:  Consider referral to occupational therapy pending progress.  Education Assessment:   Learner/Method: Patient;Listening;Demonstration;Pictures/Video;No Barriers to Learning    Risks and benefits of evaluation/treatment have been explained.   Patient/Family/caregiver agrees with Plan of Care.     Evaluation Time:     PT Eval, Low Complexity Minutes (76973): 23       Signing Clinician: Robson Hawkins PT

## 2024-08-19 ENCOUNTER — THERAPY VISIT (OUTPATIENT)
Dept: PHYSICAL THERAPY | Facility: REHABILITATION | Age: 44
End: 2024-08-19
Payer: OTHER MISCELLANEOUS

## 2024-08-19 DIAGNOSIS — M25.521 PAIN AND SWELLING OF RIGHT ELBOW: Primary | ICD-10-CM

## 2024-08-19 DIAGNOSIS — M25.421 PAIN AND SWELLING OF RIGHT ELBOW: Primary | ICD-10-CM

## 2024-08-19 PROCEDURE — 97110 THERAPEUTIC EXERCISES: CPT | Mod: GP

## 2024-08-19 PROCEDURE — 97140 MANUAL THERAPY 1/> REGIONS: CPT | Mod: GP

## 2024-09-16 ENCOUNTER — THERAPY VISIT (OUTPATIENT)
Dept: PHYSICAL THERAPY | Facility: REHABILITATION | Age: 44
End: 2024-09-16
Attending: STUDENT IN AN ORGANIZED HEALTH CARE EDUCATION/TRAINING PROGRAM
Payer: OTHER MISCELLANEOUS

## 2024-09-16 DIAGNOSIS — M25.521 PAIN AND SWELLING OF RIGHT ELBOW: Primary | ICD-10-CM

## 2024-09-16 DIAGNOSIS — M25.421 PAIN AND SWELLING OF RIGHT ELBOW: Primary | ICD-10-CM

## 2024-09-16 PROCEDURE — 97140 MANUAL THERAPY 1/> REGIONS: CPT | Mod: GP

## 2024-09-16 PROCEDURE — 97110 THERAPEUTIC EXERCISES: CPT | Mod: GP

## 2024-09-16 NOTE — PROGRESS NOTES
PLAN  One more visit on 10/7/24 to ensure lasting gains and discuss discharge planning.    Beginning/End Dates of Progress Note Reporting Period:  08/15/2024 to 09/16/2024    Referring Provider:  Heather Sellers           09/16/24 0500   Appointment Info   Signing clinician's name / credentials Robson Hawkins PT   Visits Used 3   Medical Diagnosis Pain and swelling of right elbow   PT Tx Diagnosis Left elbow pain with mobility and strength deficits   Progress Note/Certification   Start of Care Date 08/15/24   Onset of illness/injury or Date of Surgery 07/06/24   Therapy Frequency 1 time per week   Predicted Duration 8 weeks   Progress Note Due Date 10/14/24   Progress Note Completed Date 09/16/24   PT Goal 1   Goal Identifier HEP   Goal Description Kobe will demonstrate mastery of ([id number] need for cueing) and compliance with (completion at least 5/7 days/week) his home exercise program to facilitate increased rate of improvement.   Goal Progress Up to 5-6 days/week; less recently due to improved symptoms   Target Date 09/12/24   Date Met 09/16/24   PT Goal 2   Goal Identifier Upper Extremity Functional Index (UEFI)   Goal Description Kobe will demonstrate significantly improved function as evidenced by an improved UEFI score of 65/80.   Goal Progress 65/80   Target Date 10/10/24   Date Met 09/16/24   PT Goal 3   Goal Identifier Work   Goal Description Right will demonstrate significantly improved tolerance to work as evidenced by his ability to perform all required work tasks/functions with self-report elbow pain no higher than 2/10.   Goal Progress Up to 3/10 when lifting heavier objects   Target Date 10/10/24   Subjective Report   Subjective Report Kobe reports significant improvements since last visit, specifically noting much less pain. He approximates 75% to 80% improvement overall since onofre of symtpoms.   Objective Measures   Objective Measures Objective Measure 2;Objective Measure 1;Objective  Measure 3;Objective Measure 4   Objective Measure 1   Objective Measure Worst Pain   Details Last 2 weeks: 3/10   Objective Measure 2   Objective Measure Right Elbow AROM   Details Flexion, extension, supination, and pronation: all WNL and symmetric (mild pain with elbow extension).  (All left-sided values within normal limits)   Objective Measure 3   Objective Measure Right Upper Extremity Strength   Details Elbow flexion: 5/5 (pain-free).  Extension: 4+/5 (mild).  Wrist extension: 5-/5 (mild).  Supination 5/5 (pain-free).  Pronation: 5/5 (pain-free).  (All left-sided values within normal limits)   Objective Measure 4   Objective Measure  Strength (pounds of pressure)   Details Right: 80.67.   Therapeutic Procedure/Exercise   Therapeutic Procedures: strength, endurance, ROM, flexibility minutes (76184) 28   Ther Proc 1 UBE + subjective report + discussion regarding goals/progress and plan of care   Ther Proc 1 - Details 2.5 minutes forward + 2.5 minutes reverse   Ther Proc 3 Wrist extension eccentric   Ther Proc 3 - Details 2 x 12 on right (red TheraBand)   Ther Proc 5 Supination/pronation AROM   Ther Proc 5 - Details 2 x 15 (holding end of 3-pound bar)   Skilled Intervention Exercises to increase elbow and forearm flexibility/mobility and strength/stability   Patient Response/Progress Added wrist extensor eccentric; increased resistance of supination/pronation. Kobe tolerated all exercises and progressions well without increased symptoms and with cueing for proper form.   Ther Proc 6 Objective measures   Ther Proc 6 - Details See objective measures.   Manual Therapy   Manual Therapy: Mobilization, MFR, MLD, friction massage minutes (71177) 8   Manual Therapy 1 Soft tissue mobilization (STM)   Manual Therapy 1 - Details STM with min-mod pressure to lateral wrist extensors in sitting   Skilled Intervention STM to decrease pain and tissue tension   Patient Response/Progress Kobe again reported symptom relief  following intervention.   Education   Learner/Method Patient;Listening;Demonstration;Pictures/Video;No Barriers to Learning   Plan   Home program See PTRx.   Plan for next session Continue to progress elbow flexibility/mobility and strength/stability exercises as tolerated.  Continue manual therapy as appropriate.  Consider referral to occupational therapy pending progress.   Comments   Comments Impression/Assessment: Kobe returns to physical therapy after several weeks away due to scheduling difficulties. He reports significant improvement since onofre of symptoms, approximating 75% to 80% improvement overall. He is now able to perform all work tasks/functions with no more than 3/10 pain at worst. He also demonstrates significantly improved right elbow active range of motion and strength. He has improved his UEFI score to 65/80, indicating significantly improved function. He will benefit from at least one more visit of physical therapy to ensure lasting gains and to discuss discharge planning.   Total Session Time   Timed Code Treatment Minutes 36   Total Treatment Time (sum of timed and untimed services) 36

## 2024-10-07 ENCOUNTER — THERAPY VISIT (OUTPATIENT)
Dept: PHYSICAL THERAPY | Facility: REHABILITATION | Age: 44
End: 2024-10-07
Payer: OTHER MISCELLANEOUS

## 2024-10-07 DIAGNOSIS — M25.421 PAIN AND SWELLING OF RIGHT ELBOW: Primary | ICD-10-CM

## 2024-10-07 DIAGNOSIS — M25.521 PAIN AND SWELLING OF RIGHT ELBOW: Primary | ICD-10-CM

## 2024-10-07 PROCEDURE — 97140 MANUAL THERAPY 1/> REGIONS: CPT | Mod: GP

## 2024-10-07 PROCEDURE — 97110 THERAPEUTIC EXERCISES: CPT | Mod: GP

## 2024-10-07 NOTE — PROGRESS NOTES
"    PLAN  Discharge pending 1 month trial of HEP.    Beginning/End Dates of Progress Note Reporting Period:  09/16/24 to 10/07/2024    Referring Provider:  Heather Sellers         10/07/24 0500   Appointment Info   Signing clinician's name / credentials Robson Hawkins, PT   Visits Used 4   Medical Diagnosis Pain and swelling of right elbow   PT Tx Diagnosis Left elbow pain with mobility and strength deficits   Progress Note/Certification   Start of Care Date 08/15/24   Onset of illness/injury or Date of Surgery 07/06/24   Therapy Frequency 1 time per week   Predicted Duration 8 weeks   Progress Note Due Date 10/14/24   Progress Note Completed Date 09/16/24   PT Goal 1   Goal Identifier HEP   Goal Description Kobe will demonstrate mastery of ([id number]-ib-tx need for cueing) and compliance with (completion at least 5/7 days/week) his home exercise program to facilitate increased rate of improvement.   Goal Progress Up to 5-6 days/week; less recently due to improved symptoms   Target Date 09/12/24   Date Met 09/16/24   PT Goal 2   Goal Identifier Upper Extremity Functional Index (UEFI)   Goal Description Kobe will demonstrate significantly improved function as evidenced by an improved UEFI score of 65/80.   Goal Progress 65/80   Target Date 10/10/24   Date Met 09/16/24   PT Goal 3   Goal Identifier Work   Goal Description Right will demonstrate significantly improved tolerance to work as evidenced by his ability to perform all required work tasks/functions with self-report elbow pain no higher than 2/10.   Goal Progress Up to 3-4/10 when lifting heavier objects   Target Date 10/10/24   Subjective Report   Subjective Report Kobe reports that things have been \"pretty good,\" though he still notes some minor pain as he continues to increase the weight he lifts at work. He specifically denies any pain or limitation with activities of daily living.   Objective Measures   Objective Measures Objective Measure 2;Objective Measure " 1;Objective Measure 3;Objective Measure 4   Objective Measure 1   Objective Measure Worst Pain   Details Last 2 weeks: 3-4/10 (lifting heavy objects at work)   Objective Measure 2   Objective Measure Right Elbow AROM   Details Flexion, extension, supination, and pronation: all WNL, pain-free, and symmetric  (All left-sided values within normal limits)   Objective Measure 3   Objective Measure Right Upper Extremity Strength   Details Elbow flexion: 5/5 (pain-free).  Extension: 5-/5 (pain-free).  Wrist extension: 5-/5 (pain-free).  (All left-sided values within normal limits)   Objective Measure 4   Objective Measure  Strength (pounds of pressure)   Details Right: 90.33   Therapeutic Procedure/Exercise   Therapeutic Procedures: strength, endurance, ROM, flexibility minutes (29177) 24   Ther Proc 1 UBE + subjective report + discussion regarding goals/progress and plan of care   Ther Proc 1 - Details 2.5 minutes forward + 2.5 minutes reverse   Ther Proc 3 Wrist extension eccentric   Ther Proc 3 - Details 2 x 12 on right (5-pound weight)   Ther Proc 4 Alternating wrist radial deviation concentric/ulnar deviation eccentric   Ther Proc 4 - Details 1 x 12 each (5-pound weight)   Ther Proc 5 Supination/pronation AROM   Ther Proc 5 - Details 2 x 15 (holding end of 5-pound weight)   Ther Proc 6 Objective measures   Ther Proc 6 - Details See objective measures.   Skilled Intervention Exercises to increase elbow and forearm flexibility/mobility and strength/stability   Patient Response/Progress Added ulnar/radial deviation; progressed resistance/sets/reps. Kobe tolerated all exercises and progressions well without increased symptoms and with cueing for proper form.   Manual Therapy   Manual Therapy: Mobilization, MFR, MLD, friction massage minutes (25784) 9   Manual Therapy 1 Soft tissue mobilization (STM)   Manual Therapy 1 - Details STM with min-mod pressure to lateral wrist extensors in sitting   Skilled Intervention STM  to decrease pain and tissue tension   Patient Response/Progress Kobe continues to note relief with manual therapy.   Education   Learner/Method Patient;Listening;Demonstration;Pictures/Video;No Barriers to Learning   Plan   Home program See PTRx.   Updates to plan of care Discharge pending 1 month trial of HEP.   Comments   Comments Assessment: Kobe returns to physical therapy reporting continued improvements and little to no pain other than when lifting heavy objects at work. He has improved his upper extremity functional index to 65/80, indicating significantly improved function. He also demonstrates significantly improved right upper extremity active range of motion and strength. He now reports readiness for trial of a home exercise program, and if therapist has not heard from him in 1 month he will be formally discharged.   Total Session Time   Timed Code Treatment Minutes 33   Total Treatment Time (sum of timed and untimed services) 33

## 2024-10-15 ENCOUNTER — VIRTUAL VISIT (OUTPATIENT)
Dept: SLEEP MEDICINE | Facility: CLINIC | Age: 44
End: 2024-10-15
Payer: COMMERCIAL

## 2024-10-15 VITALS — WEIGHT: 280 LBS | BODY MASS INDEX: 40.09 KG/M2 | HEIGHT: 70 IN

## 2024-10-15 DIAGNOSIS — G47.33 OSA (OBSTRUCTIVE SLEEP APNEA): Primary | ICD-10-CM

## 2024-10-15 PROCEDURE — 99213 OFFICE O/P EST LOW 20 MIN: CPT | Mod: 95 | Performed by: PHYSICIAN ASSISTANT

## 2024-10-15 ASSESSMENT — SLEEP AND FATIGUE QUESTIONNAIRES
HOW LIKELY ARE YOU TO NOD OFF OR FALL ASLEEP WHILE LYING DOWN TO REST IN THE AFTERNOON WHEN CIRCUMSTANCES PERMIT: MODERATE CHANCE OF DOZING
HOW LIKELY ARE YOU TO NOD OFF OR FALL ASLEEP WHILE SITTING INACTIVE IN A PUBLIC PLACE: MODERATE CHANCE OF DOZING
HOW LIKELY ARE YOU TO NOD OFF OR FALL ASLEEP WHILE SITTING AND READING: SLIGHT CHANCE OF DOZING
HOW LIKELY ARE YOU TO NOD OFF OR FALL ASLEEP IN A CAR, WHILE STOPPED FOR A FEW MINUTES IN TRAFFIC: WOULD NEVER DOZE
HOW LIKELY ARE YOU TO NOD OFF OR FALL ASLEEP WHILE SITTING QUIETLY AFTER LUNCH WITHOUT ALCOHOL: WOULD NEVER DOZE
HOW LIKELY ARE YOU TO NOD OFF OR FALL ASLEEP WHEN YOU ARE A PASSENGER IN A CAR FOR AN HOUR WITHOUT A BREAK: SLIGHT CHANCE OF DOZING
HOW LIKELY ARE YOU TO NOD OFF OR FALL ASLEEP WHILE SITTING AND TALKING TO SOMEONE: WOULD NEVER DOZE
HOW LIKELY ARE YOU TO NOD OFF OR FALL ASLEEP WHILE WATCHING TV: SLIGHT CHANCE OF DOZING

## 2024-10-15 ASSESSMENT — PAIN SCALES - GENERAL: PAINLEVEL: SEVERE PAIN (7)

## 2024-10-15 NOTE — NURSING NOTE
Current patient location: [address] [place] [address]    Is the patient currently in the state of [place]? YES    Visit mode:VIDEO    If the visit is dropped, the patient can be reconnected by: VIDEO VISIT: Text to cell phone:   Telephone Information:   Mobile [phone]       Will anyone else be joining the visit? NO  (If patient encounters technical issues they should call [phone])    Are changes needed to the allergy or medication list? Pt stated no med changes    Are refills needed on medications prescribed by this physician? NO    Rooming Documentation:  Questionnaire(s) completed    Reason for visit: RECHECK    Marilia CALIXTOF

## 2024-11-01 DIAGNOSIS — I10 ESSENTIAL HYPERTENSION: ICD-10-CM

## 2024-11-01 RX ORDER — IRBESARTAN 150 MG/1
150 TABLET ORAL AT BEDTIME
Qty: 90 TABLET | Refills: 0 | Status: SHIPPED | OUTPATIENT
Start: 2024-11-01

## 2024-11-19 DIAGNOSIS — E78.2 MIXED HYPERLIPIDEMIA: ICD-10-CM

## 2024-11-19 RX ORDER — ROSUVASTATIN CALCIUM 10 MG/1
10 TABLET, COATED ORAL DAILY
Qty: 90 TABLET | Refills: 1 | Status: SHIPPED | OUTPATIENT
Start: 2024-11-19

## 2024-11-19 NOTE — TELEPHONE ENCOUNTER
LDL Cholesterol Calculated   Date Value Ref Range Status   01/08/2024 84 <=100 mg/dL Final   01/04/2021 115 (H) <100 mg/dL Final     Comment:     Above desirable:  100-129 mg/dl  Borderline High:  130-159 mg/dL  High:             160-189 mg/dL  Very high:       >189 mg/dl

## 2024-11-20 NOTE — PROGRESS NOTES
ASSESSMENT & PLAN    Kobe was seen today for follow up.    Diagnoses and all orders for this visit:    Pain and swelling of right elbow      Worker comp injury 7/6/2024 at work for pain and swelling of right elbow. Previously unable to lift. Has completed PT and has been lifting as tolerated without pain. Resolved at this time. Given note for no work restrictions. Will follow-up as needed.       Return sooner if develops new or worsening symptoms.    Options for treatment and/or follow-up care were reviewed with the patient was actively involved in the decision making process. Patient verbalized understanding and was in agreement with the plan.    >30 minutes spent on the date of the encounter doing chart reivew, history and exam, documentation and further activities as noted above with the exception of procedures.    [facility]    SUBJECTIVE-  December 2, 2024    Chief Complaint   Patient presents with    Right Elbow - Follow Up       Frank Flowers is a 44 year old male who is seen in f/u up for Pain and swelling of right elbow. Since last visit on 8/12/24 patient has completed PT x4 visits, and has returned to work with restrictions, but has been able to lift > 10 lbs without issue. He reports no pain in the past month. He does not take medications as not having pain.  - Now ~ 5 months from initial onset 7/6/24    Feels back to baseline and able to work.     PMH, Medications and Allergies were reviewed and updated as needed.    ROS:  As noted above otherwise negative.    Patient Active Problem List   Diagnosis    Essential hypertension    Mixed hyperlipidemia    Severe obstructive sleep apnea    Tobacco abuse disorder    Morbid obesity (H)    Mild major depression (H)    Hepatic steatosis    Pain and swelling of right elbow       Current Outpatient Medications   Medication Sig Dispense Refill    buPROPion (WELLBUTRIN XL) 300 MG 24 hr tablet Take  1 tablet (300 mg) by mouth every morning 90 tablet 1    hydrochlorothiazide (HYDRODIURIL) 25 MG tablet Take 1 tablet (25 mg) by mouth daily 90 tablet 1    irbesartan (AVAPRO) 150 MG tablet TAKE 1 TABLET(150 MG) BY MOUTH AT BEDTIME 90 tablet 0    rosuvastatin (CRESTOR) 10 MG tablet TAKE 1 TABLET(10 MG) BY MOUTH DAILY 90 tablet 1         OBJECTIVE:  There were no vitals taken for this visit.   General: healthy, alert and in no distress  Skin: no suspicious lesions or rash.  CV: distal perfusion intact RUe  Resp: normal respiratory effort without conversational dyspnea   Psych: normal mood and affect  Gait: NORMAL  Neuro: Normal light sensory exam of RU extremity      RIGHT ELBOW  Inspection:    No swelling, bruising, discoloration, or obvious deformity or asymmetry  Palpation:   Nontender about the medial and lateral epicondyle, mid radius forarm where he previously had tenderness Remainder of bony, ligamentous and tendinous landmarks are nontender.    Crepitus is Absent  Range of Motion:     Extension full / flexion full / pronation full / supination full but all motions painful  Strength:    Flexion limited by pain 5/5 extension limited by pain pronation limited by pain supination limited by pain 5/5 in all planes    RADIOLOGY:  Narrative & Impression   EXAM: XR FOREARM RIGHT 2 VIEWS, XR ELBOW RIGHT G/E 3 VIEWS  LOCATION: [facility]  DATE: 8/12/2024     INDICATION:  Pain and swelling of right elbow, Pain and swelling of right elbow  COMPARISON: None.                                                                      IMPRESSION: Within normal limits. No fracture. No opaque foreign body.     Narrative & Impression   EXAM: XR FOREARM RIGHT 2 VIEWS, XR ELBOW RIGHT G/E 3 VIEWS  LOCATION: [facility]  DATE: 8/12/2024     INDICATION:  Pain and swelling of right elbow, Pain and swelling of right elbow  COMPARISON: None.                                                                       IMPRESSION: Within normal limits. No fracture. No opaque foreign body.                    Disclaimer: This note consists of symbols derived from keyboarding, dictation and/or voice recognition software. As a result, there may be errors in the script that have gone undetected. Please consider this when interpreting information found in this chart.

## 2024-12-02 ENCOUNTER — OFFICE VISIT (OUTPATIENT)
Dept: ORTHOPEDICS | Facility: CLINIC | Age: 44
End: 2024-12-02
Payer: OTHER MISCELLANEOUS

## 2024-12-02 DIAGNOSIS — M25.421 PAIN AND SWELLING OF RIGHT ELBOW: Primary | ICD-10-CM

## 2024-12-02 DIAGNOSIS — M25.521 PAIN AND SWELLING OF RIGHT ELBOW: Primary | ICD-10-CM

## 2024-12-02 DIAGNOSIS — G89.29 CHRONIC ANKLE PAIN, BILATERAL: Primary | ICD-10-CM

## 2024-12-02 DIAGNOSIS — M19.172 POST-TRAUMATIC OSTEOARTHRITIS OF BOTH ANKLES: ICD-10-CM

## 2024-12-02 DIAGNOSIS — M25.572 CHRONIC ANKLE PAIN, BILATERAL: Primary | ICD-10-CM

## 2024-12-02 DIAGNOSIS — M25.571 CHRONIC ANKLE PAIN, BILATERAL: Primary | ICD-10-CM

## 2024-12-02 DIAGNOSIS — M19.171 POST-TRAUMATIC OSTEOARTHRITIS OF BOTH ANKLES: ICD-10-CM

## 2024-12-02 PROCEDURE — 99214 OFFICE O/P EST MOD 30 MIN: CPT | Performed by: STUDENT IN AN ORGANIZED HEALTH CARE EDUCATION/TRAINING PROGRAM

## 2024-12-02 NOTE — PATIENT INSTRUCTIONS
1. Pain and swelling of right elbow      - resolved after PT and rest. Ready to go back to full duty. Work note given.     Please call [phone]  Ask for my team if you have any questions or concerns    Heather Sellers DO  [facility]      Thank you for choosing [facility]!    CLINIC LOCATIONS:     [facility]  TRIAGE LINE: [phone]   [address] [place] [address] APPOINTMENTS: [phone]   [place] [address] RADIOLOGY: [phone]   (Monday, Thursday & Friday) PHYSICAL THERAPY: [phone]    BILLING QUESTIONS: [phone]   [place] FAX: [phone] [address]    [place] [address]    (Wednesday)

## 2024-12-02 NOTE — LETTER
December 2, 2024      Frank Flowers  [address]  [place] [address]        To Whom It May Concern:    Frank Flowers was seen in our clinic. He may return to work without restrictions on or about 12/3/2024.      Sincerely,      Heather Sellers

## 2024-12-02 NOTE — Clinical Note
2024    Frank Flowers   1980        To Whom it May Concern;    Please excuse Frank RECINOS Lionel from work/school for a healthcare visit on Dec 2, 2024.    Sincerely,        Heather Sellers, DO

## 2024-12-02 NOTE — LETTER
12/2/2024      Frank Flowers  [address] [place] [address]      Dear Colleague,    Thank you for referring your patient, Frank Flowers, to the [facility]. Please see a copy of my visit note below.    ASSESSMENT & PLAN    Kobe was seen today for follow up.    Diagnoses and all orders for this visit:    Pain and swelling of right elbow      Worker comp injury 7/6/2024 at work for pain and swelling of right elbow. Previously unable to lift. Has completed PT and has been lifting as tolerated without pain. Resolved at this time. Given note for no work restrictions. Will follow-up as needed.       Return sooner if develops new or worsening symptoms.    Options for treatment and/or follow-up care were reviewed with the patient was actively involved in the decision making process. Patient verbalized understanding and was in agreement with the plan.    >30 minutes spent on the date of the encounter doing chart reivew, history and exam, documentation and further activities as noted above with the exception of procedures.    Heather Sellers DO  [facility]    SUBJECTIVE-  December 2, 2024    Chief Complaint   Patient presents with     Right Elbow - Follow Up       Frank Flowers is a 44 year old male who is seen in f/u up for Pain and swelling of right elbow. Since last visit on 8/12/24 patient has completed PT x4 visits, and has returned to work with restrictions, but has been able to lift > 10 lbs without issue. He reports no pain in the past month. He does not take medications as not having pain.  - Now ~ 5 months from initial onset 7/6/24    Feels back to baseline and able to work.     PMH, Medications and Allergies were reviewed and updated as needed.    ROS:  As noted above otherwise negative.    Patient Active Problem List   Diagnosis     Essential hypertension     Mixed hyperlipidemia     Severe  obstructive sleep apnea     Tobacco abuse disorder     Morbid obesity (H)     Mild major depression (H)     Hepatic steatosis     Pain and swelling of right elbow       Current Outpatient Medications   Medication Sig Dispense Refill     buPROPion (WELLBUTRIN XL) 300 MG 24 hr tablet Take 1 tablet (300 mg) by mouth every morning 90 tablet 1     hydrochlorothiazide (HYDRODIURIL) 25 MG tablet Take 1 tablet (25 mg) by mouth daily 90 tablet 1     irbesartan (AVAPRO) 150 MG tablet TAKE 1 TABLET(150 MG) BY MOUTH AT BEDTIME 90 tablet 0     rosuvastatin (CRESTOR) 10 MG tablet TAKE 1 TABLET(10 MG) BY MOUTH DAILY 90 tablet 1         OBJECTIVE:  There were no vitals taken for this visit.   General: healthy, alert and in no distress  Skin: no suspicious lesions or rash.  CV: distal perfusion intact RUe  Resp: normal respiratory effort without conversational dyspnea   Psych: normal mood and affect  Gait: NORMAL  Neuro: Normal light sensory exam of RU extremity      RIGHT ELBOW  Inspection:    No swelling, bruising, discoloration, or obvious deformity or asymmetry  Palpation:   Nontender about the medial and lateral epicondyle, mid radius forarm where he previously had tenderness Remainder of bony, ligamentous and tendinous landmarks are nontender.    Crepitus is Absent  Range of Motion:     Extension full / flexion full / pronation full / supination full but all motions painful  Strength:    Flexion limited by pain 5/5 extension limited by pain pronation limited by pain supination limited by pain 5/5 in all planes    RADIOLOGY:  Narrative & Impression   EXAM: XR FOREARM RIGHT 2 VIEWS, XR ELBOW RIGHT G/E 3 VIEWS  LOCATION: [facility]  DATE: 8/12/2024     INDICATION:  Pain and swelling of right elbow, Pain and swelling of right elbow  COMPARISON: None.                                                                      IMPRESSION: Within normal limits. No fracture. No opaque foreign body.     Narrative &  Impression   EXAM: XR FOREARM RIGHT 2 VIEWS, XR ELBOW RIGHT G/E 3 VIEWS  LOCATION: [facility]  DATE: 8/12/2024     INDICATION:  Pain and swelling of right elbow, Pain and swelling of right elbow  COMPARISON: None.                                                                      IMPRESSION: Within normal limits. No fracture. No opaque foreign body.                    Disclaimer: This note consists of symbols derived from keyboarding, dictation and/or voice recognition software. As a result, there may be errors in the script that have gone undetected. Please consider this when interpreting information found in this chart.        Again, thank you for allowing me to participate in the care of your patient.        Sincerely,        Heather Sellers, DO

## 2024-12-02 NOTE — Clinical Note
REPORT OF WORK COMP    Frank Flowers   [address] [place] [address]      PATIENT DATA    Employee Name: Frank Flowers      : 1980     #: [id number]    Work related injury: {YES/NO:63}  Employer at time of injury: ***  Employer contact & phone: ***  Employed elsewhere? {YES/NO:63}  Workers' Compensation Carrier/Managed Care Plan:  ***    Today's date: 2024  Date of injury: ***  Date of first visit: ***    PROVIDER EVALUATION: Please fill in as needed.  Please give copy to employee for employer.    1. Diagnosis: ***    2. Treatment: ***.  3. Medication: ***  NOTE: When ordering a medication, [place] Rules require Work Comp or WC on prescriptions.    4. No work from *** to ***.  5. Return to work date: ***   ** WITH RESTRICTIONS? {:[id number]}      RESTRICTIONS: Unlimited unless listed.  Restrictions apply to home and leisure also.  If work restrictions is not available, the employee is totally disabled.    Maximum Medical Improvement (Date): ***  Any Permanent Partial Disability? {:[id number]}    Provider comments: ***    Medical Examiner: ***          License or registration: ***    Next appointment: {:[id number]}    CC: Employer, Managed Care Plan/Payor, Patient

## 2024-12-03 ENCOUNTER — PATIENT OUTREACH (OUTPATIENT)
Dept: CARE COORDINATION | Facility: CLINIC | Age: 44
End: 2024-12-03
Payer: COMMERCIAL

## 2024-12-05 ENCOUNTER — PATIENT OUTREACH (OUTPATIENT)
Dept: CARE COORDINATION | Facility: CLINIC | Age: 44
End: 2024-12-05
Payer: COMMERCIAL

## 2024-12-10 ENCOUNTER — MYC REFILL (OUTPATIENT)
Dept: INTERNAL MEDICINE | Facility: CLINIC | Age: 44
End: 2024-12-10
Payer: COMMERCIAL

## 2024-12-10 ENCOUNTER — MYC MEDICAL ADVICE (OUTPATIENT)
Dept: FAMILY MEDICINE | Facility: CLINIC | Age: 44
End: 2024-12-10
Payer: COMMERCIAL

## 2024-12-10 DIAGNOSIS — I10 ESSENTIAL HYPERTENSION: ICD-10-CM

## 2024-12-10 RX ORDER — IRBESARTAN 150 MG/1
150 TABLET ORAL AT BEDTIME
Qty: 90 TABLET | Refills: 0 | OUTPATIENT
Start: 2024-12-10

## 2024-12-11 RX ORDER — IRBESARTAN 150 MG/1
150 TABLET ORAL AT BEDTIME
Qty: 30 TABLET | Refills: 1 | Status: SHIPPED | OUTPATIENT
Start: 2024-12-11

## 2025-01-13 ENCOUNTER — OFFICE VISIT (OUTPATIENT)
Dept: FAMILY MEDICINE | Facility: CLINIC | Age: 45
End: 2025-01-13
Attending: FAMILY MEDICINE
Payer: COMMERCIAL

## 2025-01-13 VITALS
BODY MASS INDEX: 39.08 KG/M2 | DIASTOLIC BLOOD PRESSURE: 88 MMHG | WEIGHT: 273 LBS | TEMPERATURE: 98.7 F | RESPIRATION RATE: 14 BRPM | HEART RATE: 75 BPM | OXYGEN SATURATION: 98 % | HEIGHT: 70 IN | SYSTOLIC BLOOD PRESSURE: 137 MMHG

## 2025-01-13 DIAGNOSIS — G47.33 SEVERE OBSTRUCTIVE SLEEP APNEA: ICD-10-CM

## 2025-01-13 DIAGNOSIS — Z00.00 PHYSICAL EXAM: ICD-10-CM

## 2025-01-13 DIAGNOSIS — I10 HYPERTENSION, UNSPECIFIED TYPE: ICD-10-CM

## 2025-01-13 DIAGNOSIS — E66.01 MORBID OBESITY (H): ICD-10-CM

## 2025-01-13 DIAGNOSIS — I10 ESSENTIAL HYPERTENSION: ICD-10-CM

## 2025-01-13 DIAGNOSIS — F32.0 MILD MAJOR DEPRESSION: ICD-10-CM

## 2025-01-13 DIAGNOSIS — Z72.0 TOBACCO ABUSE DISORDER: ICD-10-CM

## 2025-01-13 DIAGNOSIS — E78.2 MIXED HYPERLIPIDEMIA: Primary | ICD-10-CM

## 2025-01-13 DIAGNOSIS — K76.0 HEPATIC STEATOSIS: ICD-10-CM

## 2025-01-13 DIAGNOSIS — Z12.11 SCREEN FOR COLON CANCER: ICD-10-CM

## 2025-01-13 PROBLEM — E78.5 HYPERLIPIDEMIA: Status: ACTIVE | Noted: 2018-04-25

## 2025-01-13 LAB
ALBUMIN SERPL BCG-MCNC: 4.2 G/DL (ref 3.5–5.2)
ALP SERPL-CCNC: 93 U/L (ref 40–150)
ALT SERPL W P-5'-P-CCNC: 51 U/L (ref 0–70)
ANION GAP SERPL CALCULATED.3IONS-SCNC: 11 MMOL/L (ref 7–15)
AST SERPL W P-5'-P-CCNC: 44 U/L (ref 0–45)
BILIRUB SERPL-MCNC: 0.4 MG/DL
BUN SERPL-MCNC: 15.2 MG/DL (ref 6–20)
CALCIUM SERPL-MCNC: 9.6 MG/DL (ref 8.8–10.4)
CHLORIDE SERPL-SCNC: 101 MMOL/L (ref 98–107)
CHOLEST SERPL-MCNC: 189 MG/DL
CREAT SERPL-MCNC: 1.16 MG/DL (ref 0.67–1.17)
EGFRCR SERPLBLD CKD-EPI 2021: 79 ML/MIN/1.73M2
EST. AVERAGE GLUCOSE BLD GHB EST-MCNC: 117 MG/DL
FASTING STATUS PATIENT QL REPORTED: ABNORMAL
FASTING STATUS PATIENT QL REPORTED: ABNORMAL
GLUCOSE SERPL-MCNC: 101 MG/DL (ref 70–99)
HBA1C MFR BLD: 5.7 % (ref 0–5.6)
HCO3 SERPL-SCNC: 28 MMOL/L (ref 22–29)
HDLC SERPL-MCNC: 46 MG/DL
LDLC SERPL CALC-MCNC: 122 MG/DL
NONHDLC SERPL-MCNC: 143 MG/DL
POTASSIUM SERPL-SCNC: 4.3 MMOL/L (ref 3.4–5.3)
PROT SERPL-MCNC: 7.7 G/DL (ref 6.4–8.3)
SODIUM SERPL-SCNC: 140 MMOL/L (ref 135–145)
TRIGL SERPL-MCNC: 106 MG/DL

## 2025-01-13 PROCEDURE — 80061 LIPID PANEL: CPT | Performed by: FAMILY MEDICINE

## 2025-01-13 PROCEDURE — G2211 COMPLEX E/M VISIT ADD ON: HCPCS | Performed by: FAMILY MEDICINE

## 2025-01-13 PROCEDURE — 80053 COMPREHEN METABOLIC PANEL: CPT | Performed by: FAMILY MEDICINE

## 2025-01-13 PROCEDURE — 83036 HEMOGLOBIN GLYCOSYLATED A1C: CPT | Performed by: FAMILY MEDICINE

## 2025-01-13 PROCEDURE — 99214 OFFICE O/P EST MOD 30 MIN: CPT | Mod: 25 | Performed by: FAMILY MEDICINE

## 2025-01-13 PROCEDURE — 99396 PREV VISIT EST AGE 40-64: CPT | Performed by: FAMILY MEDICINE

## 2025-01-13 PROCEDURE — 36415 COLL VENOUS BLD VENIPUNCTURE: CPT | Performed by: FAMILY MEDICINE

## 2025-01-13 RX ORDER — HYDROCHLOROTHIAZIDE 25 MG/1
25 TABLET ORAL DAILY
Qty: 90 TABLET | Refills: 3 | Status: SHIPPED | OUTPATIENT
Start: 2025-01-13

## 2025-01-13 RX ORDER — ROSUVASTATIN CALCIUM 10 MG/1
10 TABLET, COATED ORAL DAILY
Qty: 90 TABLET | Refills: 3 | Status: SHIPPED | OUTPATIENT
Start: 2025-01-13

## 2025-01-13 RX ORDER — BUPROPION HYDROCHLORIDE 300 MG/1
300 TABLET ORAL EVERY MORNING
Qty: 90 TABLET | Refills: 3 | Status: SHIPPED | OUTPATIENT
Start: 2025-01-13

## 2025-01-13 RX ORDER — IRBESARTAN 150 MG/1
150 TABLET ORAL AT BEDTIME
Qty: 90 TABLET | Refills: 3 | Status: SHIPPED | OUTPATIENT
Start: 2025-01-13

## 2025-01-13 SDOH — HEALTH STABILITY: PHYSICAL HEALTH: ON AVERAGE, HOW MANY DAYS PER WEEK DO YOU ENGAGE IN MODERATE TO STRENUOUS EXERCISE (LIKE A BRISK WALK)?: 5 DAYS

## 2025-01-13 ASSESSMENT — SOCIAL DETERMINANTS OF HEALTH (SDOH): HOW OFTEN DO YOU GET TOGETHER WITH FRIENDS OR RELATIVES?: NEVER

## 2025-01-13 ASSESSMENT — PATIENT HEALTH QUESTIONNAIRE - PHQ9
SUM OF ALL RESPONSES TO PHQ QUESTIONS 1-9: 11
10. IF YOU CHECKED OFF ANY PROBLEMS, HOW DIFFICULT HAVE THESE PROBLEMS MADE IT FOR YOU TO DO YOUR WORK, TAKE CARE OF THINGS AT HOME, OR GET ALONG WITH OTHER PEOPLE: SOMEWHAT DIFFICULT
SUM OF ALL RESPONSES TO PHQ QUESTIONS 1-9: 11

## 2025-01-13 NOTE — PROGRESS NOTES
Preventive Care Visit  [facility]  Jan 13, 2025      Assessment & Plan     (E78.2) Hyperlipidemia  (primary encounter diagnosis)  Comment: Controlled with Crestor  Plan: Lipid panel reflex to direct LDL Non-fasting,         rosuvastatin (CRESTOR) 10 MG tablet,         Comprehensive metabolic panel             (I10) Hypertension  Comment: Well-controlled on hydrochlorothiazide and Avapro  Plan: hydrochlorothiazide (HYDRODIURIL) 25 MG tablet,        irbesartan (AVAPRO) 150 MG tablet             (Z12.11) Screen for colon cancer  Comment: Patient is due for  Plan: Colonoscopy Screening  Referral             (Z00.00) Physical exam  Comment: Patient's health is compromised by obesity  Plan: PRIMARY CARE FOLLOW-UP SCHEDULING             (F32.0) Mild major depression  Comment: Well-controlled on Wellbutrin  Plan: buPROPion (WELLBUTRIN XL) 300 MG 24 hr tablet             (E66.01) Morbid obesity (H)  Comment: Elevated BMI with comorbidity  Plan: Hemoglobin A1c             (G47.33) Severe obstructive sleep apnea  Comment: Nightly CPAP  Plan:      (Z72.0) Tobacco abuse disorder  Comment: Patient does smoke but is attempting to cut back  Plan:      (K76.0) Hepatic steatosis  Comment: Presumed metabolic  Plan:      PLAN:  1.  Medications renewed as above without changes  2.  Laboratory studies as above  3.  Referral for colonoscopy  4.  Patient otherwise should be seen yearly  5.  The longitudinal plan of care for the diagnosis(es)/condition(s) as documented were addressed during this visit. Due to the added complexity in care, I will continue to support Kobe in the subsequent management and with ongoing continuity of care.              Nicotine/Tobacco Cessation  He reports that he has been smoking cigarettes. He has a 11.5 pack-year smoking history. He has never used smokeless tobacco.  Nicotine/Tobacco Cessation Plan  Patient is attempting to cut  "back      BMI  Estimated body mass index is 39.17 kg/m  as calculated from the following:    Height as of this encounter: 1.778 m (5' 10\").    Weight as of this encounter: 123.8 kg (273 lb).   Weight management plan: Discussed healthy diet and exercise guidelines    Counseling  Appropriate preventive services were addressed with this patient via screening, questionnaire, or discussion as appropriate for fall prevention, nutrition, physical activity, Tobacco-use cessation, social engagement, weight loss and cognition.  Checklist reviewing preventive services available has been given to the patient.  Reviewed patient's diet, addressing concerns and/or questions.   Patient is at risk for social isolation and has been provided with information about the benefit of social connection.   The patient was instructed to see the dentist every 6 months.   He is at risk for psychosocial distress and has been provided with information to reduce risk.   The patient reports drinking more than 3 alcoholic drinks per day and/or more than 7 drhnks per week. The patient was counseled and given information about possible harmful effects of excessive alcohol intake.The patient's PHQ-9 score is consistent with moderate depression. He was provided with information regarding depression.           Kaitlyn Bullock is a 44 year old, presenting for the following:  Physical        1/13/2025     9:48 AM   Additional Questions   Roomed by Messi VIZCARRA  Patient comes in for his annual physical examination.  Patient has a history of hypertension well-controlled on Avapro and hydrochlorothiazide.    Patient has a history of hyperlipidemia well-controlled on Crestor    Patient has a history of depression he is on Wellbutrin he thinks it is really somewhat helpful he has been on this I believe for quite some time.    Patient has sleep apnea he is on nightly CPAP.    Patient does continue to smoke he is trying to cut down maybe 10 cigarettes a day " he has been a heavier smoker in the past.    Patient be due for but declines any further vaccinations, he is due for colorectal cancer screening.                Health Care Directive  Patient does not have a Health Care Directive: Discussed advance care planning with patient; information given to patient to review.      1/13/2025   General Health   How would you rate your overall physical health? Good   Feel stress (tense, anxious, or unable to sleep) Rather much   (!) STRESS CONCERN      1/13/2025   Nutrition   Three or more servings of calcium each day? Yes   Diet: Low salt   How many servings of fruit and vegetables per day? (!) 0-1   How many sweetened beverages each day? 0-1         1/13/2025   Exercise   Days per week of moderate/strenous exercise 5 days         1/13/2025   Social Factors   Frequency of gathering with friends or relatives Never   Worry food won't last until get money to buy more No   Food not last or not have enough money for food? No   Do you have housing? (Housing is defined as stable permanent housing and does not include staying ouside in a car, in a tent, in an abandoned building, in an overnight shelter, or couch-surfing.) Yes   Are you worried about losing your housing? No   Lack of transportation? No   Unable to get utilities (heat,electricity)? No   (!) SOCIAL CONNECTIONS CONCERN      1/13/2025   Dental   Dentist two times every year? (!) NO         1/13/2025   TB Screening   Were you born outside of the [place]? No       Today's PHQ-9 Score:       1/13/2025     9:41 AM   PHQ-9 SCORE   PHQ-9 Total Score MyChart 11 (Moderate depression)   PHQ-9 Total Score 11        Patient-reported         1/13/2025   Substance Use   If I could quit smoking, I would Completely agree   I want to quit somking, worry about health affects Completely agree   Willing to make a plan to quit smoking Somewhat agree   Willing to cut down before quitting Completely agree   Alcohol more than 3/day or more than 7/wk  Yes   How often do you have a drink containing alcohol 4 or more times a week   How many alcohol drinks on typical day 3 or 4   How often do you have 5+ drinks at one occasion Weekly   Audit 2/3 Score 4   How often not able to stop drinking once started Never   How often failed to do what normally expected Never   How often needed first drink in am after a heavy drinking session Never   How often feeling of guilt or remorse after drinking Never   How often unable to remember what happened the night before Never   Have you or someone else been injured because of your drinking No   Has anyone been concerned or suggested you cut down on drinking No   TOTAL SCORE - AUDIT 8   Do you use any other substances recreationally? (!) PRESCRIPTION DRUGS     Social History     Tobacco Use    Smoking status: Every Day     Current packs/day: 0.50     Average packs/day: 0.5 packs/day for 23.0 years (11.5 ttl pk-yrs)     Types: Cigarettes    Smokeless tobacco: Never    Tobacco comments:     smoking since age 18- cutting down, 5 cig /day    Vaping Use    Vaping status: Former    Substances: Nicotine    Devices: Disposable   Substance Use Topics    Alcohol use: Yes     Comment: 2 drinks/day    Drug use: No             1/13/2025   One time HIV Screening   Previous HIV test? No         1/13/2025   STI Screening   New sexual partner(s) since last STI/HIV test? No   ASCVD Risk   The 10-year ASCVD risk score (Carlos CARMICHAEL, et al., 2019) is: 4.8%    Values used to calculate the score:      Age: 44 years      Sex: Male      Is Non- : No      Diabetic: No      Tobacco smoker: Yes      Systolic Blood Pressure: 137 mmHg      Is BP treated: Yes      HDL Cholesterol: 50 mg/dL      Total Cholesterol: 157 mg/dL        1/13/2025   Contraception/Family Planning   Questions about contraception or family planning No        Reviewed and updated as needed this visit by Provider                    History reviewed. No  pertinent past medical history.  Past Surgical History:   Procedure Laterality Date    DENTAL SURGERY      abscessed tooth removed under anesthesia    OPEN REDUCTION INTERNAL FIXATION RODDING INTRAMEDULLARY TIBIA Left 01/14/2018    Procedure: OPEN REDUCTION INTERNAL FIXATION RODDING INTRAMEDULLARY TIBIA;  Intramedullary nailing, left tibial shaft fracture;  Surgeon: Jian Burgos MD;  Location:  OR     Lab work is in process  Labs reviewed in EPIC  BP Readings from Last 3 Encounters:   01/13/25 137/88   06/17/24 138/78   04/22/24 (!) 142/88    Wt Readings from Last 3 Encounters:   01/13/25 123.8 kg (273 lb)   10/15/24 127 kg (280 lb)   06/17/24 135.6 kg (299 lb)                  Patient Active Problem List   Diagnosis    Hypertension    Hyperlipidemia    Severe obstructive sleep apnea    Tobacco abuse disorder    Morbid obesity (H)    Mild major depression    Hepatic steatosis     Past Surgical History:   Procedure Laterality Date    DENTAL SURGERY      abscessed tooth removed under anesthesia    OPEN REDUCTION INTERNAL FIXATION RODDING INTRAMEDULLARY TIBIA Left 01/14/2018    Procedure: OPEN REDUCTION INTERNAL FIXATION RODDING INTRAMEDULLARY TIBIA;  Intramedullary nailing, left tibial shaft fracture;  Surgeon: Jian Burgos MD;  Location:  OR       Social History     Tobacco Use    Smoking status: Every Day     Current packs/day: 0.50     Average packs/day: 0.5 packs/day for 23.0 years (11.5 ttl pk-yrs)     Types: Cigarettes    Smokeless tobacco: Never    Tobacco comments:     smoking since age 18- cutting down, 10 cig /day    Substance Use Topics    Alcohol use: Yes     Comment: 2 drinks/day     Family History   Problem Relation Age of Onset    Depression Mother     Anxiety Disorder Mother     Unknown/Adopted Father     Anxiety Disorder Sister     Sleep Apnea Sister     Unknown/Adopted Brother     Unknown/Adopted Maternal Grandmother     Unknown/Adopted Maternal Grandfather      "Unknown/Adopted Paternal Grandmother     Unknown/Adopted Paternal Grandfather          Current Outpatient Medications   Medication Sig Dispense Refill    buPROPion (WELLBUTRIN XL) 300 MG 24 hr tablet Take 1 tablet (300 mg) by mouth every morning. 90 tablet 3    hydrochlorothiazide (HYDRODIURIL) 25 MG tablet Take 1 tablet (25 mg) by mouth daily. 90 tablet 3    irbesartan (AVAPRO) 150 MG tablet Take 1 tablet (150 mg) by mouth at bedtime. 90 tablet 3    rosuvastatin (CRESTOR) 10 MG tablet Take 1 tablet (10 mg) by mouth daily. 90 tablet 3     Allergies   Allergen Reactions    Lisinopril      cough         Review of Systems  Constitutional, HEENT, cardiovascular, pulmonary, GI, , musculoskeletal, neuro, skin, endocrine and psych systems are negative, except as otherwise noted.     Objective    Exam  /88   Pulse 75   Temp 98.7  F (37.1  C) (Oral)   Resp 14   Ht 1.778 m (5' 10\")   Wt 123.8 kg (273 lb)   SpO2 98%   BMI 39.17 kg/m     Estimated body mass index is 39.17 kg/m  as calculated from the following:    Height as of this encounter: 1.778 m (5' 10\").    Weight as of this encounter: 123.8 kg (273 lb).    Physical Exam  GENERAL: alert and no distress  EYES: Eyes grossly normal to inspection, PERRL and conjunctivae and sclerae normal  HENT: ear canals and TM's normal, nose and mouth without ulcers or lesions  NECK: no adenopathy, no asymmetry, masses, or scars  RESP: lungs clear to auscultation - no rales, rhonchi or wheezes  CV: regular rate and rhythm, normal S1 S2, no S3 or S4, no murmur, click or rub, no peripheral edema  ABDOMEN: soft, nontender, no hepatosplenomegaly, no masses and bowel sounds normal  MS: no gross musculoskeletal defects noted, no edema  SKIN: no suspicious lesions or rashes  NEURO: Normal strength and tone, mentation intact and speech normal  PSYCH: mentation appears normal, affect normal/bright        Signed Electronically by: Mckinley Bradford MD    Answers submitted by the " patient for this visit:  Patient Health Questionnaire (Submitted on 1/13/2025)  If you checked off any problems, how difficult have these problems made it for you to do your work, take care of things at home, or get along with other people?: Somewhat difficult  PHQ9 TOTAL SCORE: 11

## 2025-01-13 NOTE — LETTER
January 14, 2025      Kobe RECINOS [facility]  [address] [place] [address]        Dear ,    We are writing to inform you of your test results.  Cholesterol is just slightly elevated I want you to stay on the same dose of Crestor but focus on good diet and exercise to help lower the cholesterol a bit more.  Sugar is barely high at 101 ideally under 100, another blood test called A1c is just slightly elevated at 5.7, this is consistent with prediabetes, again good diet and exercise to help keep blood sugar low  Liver and kidney tests are normal    See us again in 1 year        Resulted Orders   Lipid panel reflex to direct LDL Non-fasting   Result Value Ref Range    Cholesterol 189 <200 mg/dL    Triglycerides 106 <150 mg/dL    Direct Measure HDL 46 >=40 mg/dL    LDL Cholesterol Calculated 122 (H) <100 mg/dL    Non HDL Cholesterol 143 (H) <130 mg/dL    Patient Fasting > 8hrs? Unknown     Narrative    Cholesterol  Desirable: < 200 mg/dL  Borderline High: 200 - 239 mg/dL  High: >= 240 mg/dL    Triglycerides  Normal: < 150 mg/dL  Borderline High: 150 - 199 mg/dL  High: 200-499 mg/dL  Very High: >= 500 mg/dL    Direct Measure HDL  Female: >= 50 mg/dL   Male: >= 40 mg/dL    LDL Cholesterol  Desirable: < 100 mg/dL  Above Desirable: 100 - 129 mg/dL   Borderline High: 130 - 159 mg/dL   High:  160 - 189 mg/dL   Very High: >= 190 mg/dL    Non HDL Cholesterol  Desirable: < 130 mg/dL  Above Desirable: 130 - 159 mg/dL  Borderline High: 160 - 189 mg/dL  High: 190 - 219 mg/dL  Very High: >= 220 mg/dL   Comprehensive metabolic panel   Result Value Ref Range    Sodium 140 135 - 145 mmol/L    Potassium 4.3 3.4 - 5.3 mmol/L    Carbon Dioxide (CO2) 28 22 - 29 mmol/L    Anion Gap 11 7 - 15 mmol/L    Urea Nitrogen 15.2 6.0 - 20.0 mg/dL    Creatinine 1.16 0.67 - 1.17 mg/dL    GFR Estimate 79 >60 mL/min/1.73m2      Comment:      eGFR calculated using 2021 CKD-EPI equation.    Calcium 9.6 8.8 - 10.4 mg/dL      Comment:       Meal tray arrived, pt eating at this time. No other needs identified.    Reference intervals for this test were updated on 7/16/2024 to reflect our healthy population more accurately. There may be differences in the flagging of prior results with similar values performed with this method. Those prior results can be interpreted in the context of the updated reference intervals.    Chloride 101 98 - 107 mmol/L    Glucose 101 (H) 70 - 99 mg/dL    Alkaline Phosphatase 93 40 - 150 U/L    AST 44 0 - 45 U/L    ALT 51 0 - 70 U/L    Protein Total 7.7 6.4 - 8.3 g/dL    Albumin 4.2 3.5 - 5.2 g/dL    Bilirubin Total 0.4 <=1.2 mg/dL    Patient Fasting > 8hrs? Unknown    Hemoglobin A1c   Result Value Ref Range    Estimated Average Glucose 117 (H) <117 mg/dL    Hemoglobin A1C 5.7 (H) 0.0 - 5.6 %      Comment:      Normal <5.7%   Prediabetes 5.7-6.4%    Diabetes 6.5% or higher     Note: Adopted from ADA consensus guidelines.       If you have any questions or concerns, please call the clinic at the number listed above.       Sincerely,      Mckinley Bradford MD    Electronically signed

## 2025-01-14 PROBLEM — R73.03 PREDIABETES: Status: ACTIVE | Noted: 2025-01-14

## 2025-02-25 ENCOUNTER — MYC REFILL (OUTPATIENT)
Dept: FAMILY MEDICINE | Facility: CLINIC | Age: 45
End: 2025-02-25
Payer: COMMERCIAL

## 2025-02-25 DIAGNOSIS — I10 ESSENTIAL HYPERTENSION: ICD-10-CM

## 2025-02-25 RX ORDER — IRBESARTAN 150 MG/1
150 TABLET ORAL AT BEDTIME
Qty: 90 TABLET | Refills: 3 | OUTPATIENT
Start: 2025-02-25

## (undated) DEVICE — BAG CLEAR TRASH 1.3M 39X33" P4040C

## (undated) DEVICE — DRAPE C-ARM 60X42" 1013

## (undated) DEVICE — LINEN HALF SHEET 5512

## (undated) DEVICE — CAST PADDING 6" UNSTERILE 9046

## (undated) DEVICE — ESU GROUND PAD ADULT W/CORD E7507

## (undated) DEVICE — PREP DURAPREP 26ML APL 8630

## (undated) DEVICE — DRAPE EXTREMITY W/ARMBOARD 29405

## (undated) DEVICE — PACK LOWER EXTREMITY RIDGES

## (undated) DEVICE — TOURNIQUET CUFF 30" REPRO BLUE 60-7070-105

## (undated) DEVICE — CAST PADDING 6" STERILE 9046S

## (undated) DEVICE — PAD FOAM TRIANGLE KNEE 193-P

## (undated) DEVICE — DRILL BIT QUICK COUPLING 3 FLUTE 4.2MMX330/100MM CALIBRATE

## (undated) DEVICE — LINEN DRAPE 54X72" 5467

## (undated) DEVICE — DRSG STERI STRIP 1/2X4" R1547

## (undated) DEVICE — DRSG ADAPTIC 3X8" 6113

## (undated) DEVICE — SU VICRYL 0 CT-2 27" UND J270H

## (undated) DEVICE — DRAPE C-ARMOR 5 SIDED 5523

## (undated) DEVICE — LINEN FULL SHEET 5511

## (undated) DEVICE — Device

## (undated) DEVICE — DRILL BIT QUICK COUPLING 3 FLUTE 4.2MMX145MM NDL  POINT

## (undated) DEVICE — SU VICRYL 2-0 CT-2 27" UND J269H

## (undated) DEVICE — BNDG ELASTIC 6" DBL LENGTH UNSTERILE 6611-16

## (undated) DEVICE — SU ETHILON 3-0 FS-1 18" 669H

## (undated) DEVICE — GLOVE PROTEXIS MICRO 8.0  2D73PM80

## (undated) DEVICE — STPL SKIN 35W APPOSE 8886803712

## (undated) DEVICE — ROD SYN REAMER BALL TIP 2.5X950MM  351.706S

## (undated) DEVICE — WIRE GUIDE 3.2X400MM  357.399

## (undated) DEVICE — GLOVE PROTEXIS POWDER FREE 8.0 ORTHOPEDIC 2D73ET80

## (undated) DEVICE — SU PDS II 4-0 PS-2 18" Z496G

## (undated) DEVICE — CAST PADDING 4" STERILE 9044S

## (undated) RX ORDER — HYDROXYZINE HYDROCHLORIDE 25 MG/1
TABLET, FILM COATED ORAL
Status: DISPENSED
Start: 2018-01-14

## (undated) RX ORDER — FENTANYL CITRATE 50 UG/ML
INJECTION, SOLUTION INTRAMUSCULAR; INTRAVENOUS
Status: DISPENSED
Start: 2018-01-14

## (undated) RX ORDER — HYDROMORPHONE HYDROCHLORIDE 1 MG/ML
INJECTION, SOLUTION INTRAMUSCULAR; INTRAVENOUS; SUBCUTANEOUS
Status: DISPENSED
Start: 2018-01-14

## (undated) RX ORDER — ONDANSETRON 2 MG/ML
INJECTION INTRAMUSCULAR; INTRAVENOUS
Status: DISPENSED
Start: 2018-01-14

## (undated) RX ORDER — KETOROLAC TROMETHAMINE 30 MG/ML
INJECTION, SOLUTION INTRAMUSCULAR; INTRAVENOUS
Status: DISPENSED
Start: 2018-01-14

## (undated) RX ORDER — HYDRALAZINE HYDROCHLORIDE 20 MG/ML
INJECTION INTRAMUSCULAR; INTRAVENOUS
Status: DISPENSED
Start: 2018-01-14

## (undated) RX ORDER — CEFAZOLIN SODIUM 1 G/50ML
SOLUTION INTRAVENOUS
Status: DISPENSED
Start: 2018-01-14

## (undated) RX ORDER — GLYCOPYRROLATE 0.2 MG/ML
INJECTION INTRAMUSCULAR; INTRAVENOUS
Status: DISPENSED
Start: 2018-01-14

## (undated) RX ORDER — PHENYLEPHRINE HCL IN 0.9% NACL 1 MG/10 ML
SYRINGE (ML) INTRAVENOUS
Status: DISPENSED
Start: 2018-01-14

## (undated) RX ORDER — OXYCODONE HYDROCHLORIDE 5 MG/1
TABLET ORAL
Status: DISPENSED
Start: 2018-01-14

## (undated) RX ORDER — METOPROLOL TARTRATE 1 MG/ML
INJECTION, SOLUTION INTRAVENOUS
Status: DISPENSED
Start: 2018-01-14